# Patient Record
Sex: FEMALE | Race: WHITE | Employment: UNEMPLOYED | URBAN - NONMETROPOLITAN AREA
[De-identification: names, ages, dates, MRNs, and addresses within clinical notes are randomized per-mention and may not be internally consistent; named-entity substitution may affect disease eponyms.]

---

## 2017-01-01 ENCOUNTER — OFFICE VISIT (OUTPATIENT)
Dept: PSYCHOLOGY | Facility: OTHER | Age: 77
End: 2017-01-01
Attending: SOCIAL WORKER
Payer: COMMERCIAL

## 2017-01-01 DIAGNOSIS — F31.30 BIPOLAR I DISORDER, MOST RECENT EPISODE DEPRESSED (H): Primary | ICD-10-CM

## 2017-01-01 DIAGNOSIS — F33.2 SEVERE EPISODE OF RECURRENT MAJOR DEPRESSIVE DISORDER, WITHOUT PSYCHOTIC FEATURES (H): ICD-10-CM

## 2017-01-01 PROCEDURE — 90837 PSYTX W PT 60 MINUTES: CPT | Performed by: SOCIAL WORKER

## 2017-01-01 RX ORDER — BUPROPION HYDROCHLORIDE 300 MG/1
TABLET ORAL
Qty: 30 TABLET | Refills: 1 | Status: SHIPPED | OUTPATIENT
Start: 2017-01-01 | End: 2018-01-01

## 2017-01-01 ASSESSMENT — PATIENT HEALTH QUESTIONNAIRE - PHQ9: SUM OF ALL RESPONSES TO PHQ QUESTIONS 1-9: 22

## 2017-01-01 ASSESSMENT — ANXIETY QUESTIONNAIRES: GAD7 TOTAL SCORE: 15

## 2017-01-11 DIAGNOSIS — F01.518 VASCULAR DEMENTIA WITH BEHAVIOR DISTURBANCE (H): Primary | ICD-10-CM

## 2017-01-11 RX ORDER — DONEPEZIL HYDROCHLORIDE 10 MG/1
10 TABLET, FILM COATED ORAL AT BEDTIME
Qty: 30 TABLET | Refills: 5 | Status: SHIPPED | OUTPATIENT
Start: 2017-01-11 | End: 2017-06-19

## 2017-01-23 ENCOUNTER — OFFICE VISIT (OUTPATIENT)
Dept: PSYCHOLOGY | Facility: OTHER | Age: 77
End: 2017-01-23
Attending: SOCIAL WORKER
Payer: COMMERCIAL

## 2017-01-23 DIAGNOSIS — F31.30 BIPOLAR I DISORDER, MOST RECENT EPISODE DEPRESSED (H): Primary | ICD-10-CM

## 2017-01-23 PROCEDURE — 90791 PSYCH DIAGNOSTIC EVALUATION: CPT | Performed by: SOCIAL WORKER

## 2017-01-27 ENCOUNTER — OFFICE VISIT (OUTPATIENT)
Dept: PSYCHIATRY | Facility: OTHER | Age: 77
End: 2017-01-27
Attending: NURSE PRACTITIONER
Payer: COMMERCIAL

## 2017-01-27 VITALS
SYSTOLIC BLOOD PRESSURE: 124 MMHG | HEIGHT: 62 IN | TEMPERATURE: 98.9 F | DIASTOLIC BLOOD PRESSURE: 60 MMHG | BODY MASS INDEX: 18.55 KG/M2 | HEART RATE: 68 BPM | WEIGHT: 100.8 LBS | RESPIRATION RATE: 18 BRPM

## 2017-01-27 DIAGNOSIS — F31.9 BIPOLAR 1 DISORDER (H): ICD-10-CM

## 2017-01-27 DIAGNOSIS — F31.9 BIPOLAR 1 DISORDER (H): Primary | ICD-10-CM

## 2017-01-27 DIAGNOSIS — N39.41 URGE INCONTINENCE OF URINE: ICD-10-CM

## 2017-01-27 DIAGNOSIS — F33.41 RECURRENT MAJOR DEPRESSIVE DISORDER, IN PARTIAL REMISSION (H): Primary | ICD-10-CM

## 2017-01-27 PROCEDURE — 99212 OFFICE O/P EST SF 10 MIN: CPT

## 2017-01-27 PROCEDURE — 99214 OFFICE O/P EST MOD 30 MIN: CPT | Performed by: NURSE PRACTITIONER

## 2017-01-27 RX ORDER — TRAZODONE HYDROCHLORIDE 100 MG/1
100 TABLET ORAL AT BEDTIME
Qty: 30 TABLET | Refills: 3 | Status: SHIPPED | OUTPATIENT
Start: 2017-01-27 | End: 2018-01-01

## 2017-01-27 RX ORDER — GABAPENTIN 250 MG/5ML
SOLUTION ORAL
Qty: 450 ML | Refills: 3 | Status: CANCELLED | OUTPATIENT
Start: 2017-01-27

## 2017-01-27 RX ORDER — TOLTERODINE 4 MG/1
4 CAPSULE, EXTENDED RELEASE ORAL DAILY
Qty: 30 CAPSULE | Refills: 1 | Status: SHIPPED | OUTPATIENT
Start: 2017-01-27 | End: 2017-08-11

## 2017-01-27 RX ORDER — DULOXETIN HYDROCHLORIDE 30 MG/1
CAPSULE, DELAYED RELEASE ORAL
Qty: 30 CAPSULE | Refills: 3 | Status: SHIPPED | OUTPATIENT
Start: 2017-01-27 | End: 2017-05-08

## 2017-01-27 RX ORDER — GABAPENTIN 400 MG/1
CAPSULE ORAL
Qty: 30 CAPSULE | Refills: 3 | Status: SHIPPED | OUTPATIENT
Start: 2017-01-27 | End: 2017-05-26

## 2017-01-27 RX ORDER — GABAPENTIN 250 MG/5ML
SOLUTION ORAL
Qty: 450 ML | Refills: 3 | Status: SHIPPED | OUTPATIENT
Start: 2017-01-27 | End: 2017-03-10

## 2017-01-27 ASSESSMENT — PATIENT HEALTH QUESTIONNAIRE - PHQ9: 5. POOR APPETITE OR OVEREATING: NEARLY EVERY DAY

## 2017-01-27 ASSESSMENT — ANXIETY QUESTIONNAIRES
1. FEELING NERVOUS, ANXIOUS, OR ON EDGE: NEARLY EVERY DAY
GAD7 TOTAL SCORE: 21
2. NOT BEING ABLE TO STOP OR CONTROL WORRYING: NEARLY EVERY DAY
6. BECOMING EASILY ANNOYED OR IRRITABLE: NEARLY EVERY DAY
3. WORRYING TOO MUCH ABOUT DIFFERENT THINGS: NEARLY EVERY DAY
IF YOU CHECKED OFF ANY PROBLEMS ON THIS QUESTIONNAIRE, HOW DIFFICULT HAVE THESE PROBLEMS MADE IT FOR YOU TO DO YOUR WORK, TAKE CARE OF THINGS AT HOME, OR GET ALONG WITH OTHER PEOPLE: VERY DIFFICULT
7. FEELING AFRAID AS IF SOMETHING AWFUL MIGHT HAPPEN: NEARLY EVERY DAY
5. BEING SO RESTLESS THAT IT IS HARD TO SIT STILL: NEARLY EVERY DAY

## 2017-01-27 ASSESSMENT — PAIN SCALES - GENERAL: PAINLEVEL: NO PAIN (0)

## 2017-01-27 NOTE — PATIENT INSTRUCTIONS
1) Cymbalta 30 mg daily in addition to the 60 mg for a total of 90 mg.    2) Trazodone 50 mg d/daria. Start Trazodone 100 mg at HS.    3) Increase Gabapentin from 50 ml to 60 ml four times a day.    4) Start on Detrol LA 4 mg in the am.      Progress notes completed and signed for [place]. Returned them to pt.

## 2017-01-27 NOTE — NURSING NOTE
"Chief Complaint   Patient presents with     RECHECK       Initial /60 mmHg  Pulse 68  Temp(Src) 98.9  F (37.2  C) (Tympanic)  Resp 18  Ht 5' 1.5\" (1.562 m)  Wt 100 lb 12.8 oz (45.723 kg)  BMI 18.74 kg/m2 Estimated body mass index is 18.74 kg/(m^2) as calculated from the following:    Height as of this encounter: 5' 1.5\" (1.562 m).    Weight as of this encounter: 100 lb 12.8 oz (45.723 kg).  BP completed using cuff size: flaco Solorio LPN      "

## 2017-01-27 NOTE — MR AVS SNAPSHOT
After Visit Summary   1/27/2017    Tamera Kumar    MRN: [id number]           Patient Information     Date Of Birth          1940        Visit Information        Provider Department      1/27/2017 3:00 PM Naomi Cook APRN CNP [facility]        Today's Diagnoses     Recurrent major depressive disorder, in partial remission (H)    -  1     Bipolar 1 disorder         Urge incontinence of urine           Care Instructions    1) Cymbalta 30 mg daily in addition to the 60 mg for a total of 90 mg.    2) Trazodone 50 mg d/daria. Start Trazodone 100 mg at HS.    3) Increase Gabapentin from 50 ml to 60 ml four times a day.    4) Start on Detrol LA 4 mg in the am.      Progress notes completed and signed for [place]. Returned them to pt.        Follow-ups after your visit        Follow-up notes from your care team     Return in about 2 months (around 3/27/2017) for Routine Visit.      Your next 10 appointments already scheduled     Feb 08, 2017  2:30 PM   (Arrive by 2:15 PM)   Return Visit with Fouzia Mason [facility] ([facility])    [address] [place] [id number] [phone]            Feb 22, 2017  2:00 PM   (Arrive by 1:45 PM)   Return Visit with Fouzia Mason [facility] ([facility])    [address] [place] [id number] [phone]            Mar 10, 2017  1:30 PM   (Arrive by 1:15 PM)   Return Visit with IAN Red[facility] ([facility])    [address] [place] [id number] [phone]              Who to contact     If you have questions or need follow up information about today's clinic visit or your schedule please contact [facility] directly at [phone].  Normal or non-critical lab and imaging results will be communicated to you by MyChart, letter or phone within 4 business days after the clinic has received the  "results. If you do not hear from us within 7 days, please contact the clinic through [employer] or phone. If you have a critical or abnormal lab result, we will notify you by phone as soon as possible.  Submit refill requests through [employer] or call your pharmacy and they will forward the refill request to us. Please allow 3 business days for your refill to be completed.          Additional Information About Your Visit        [employer] Information     [employer] lets you send messages to your doctor, view your test results, renew your prescriptions, schedule appointments and more. To sign up, go to [email or web address] . Click on \"Log in\" on the left side of the screen, which will take you to the Welcome page. Then click on \"Sign up Now\" on the right side of the page.     You will be asked to enter the access code listed below, as well as some personal information. Please follow the directions to create your username and password.     Your access code is: [email or web address]  Expires: 2017  4:01 PM     Your access code will  in 90 days. If you need help or a new code, please call your [place] clinic or [phone].        Care EveryWhere ID     This is your Care EveryWhere ID. This could be used by other organizations to access your [place] medical records  [id number]        Your Vitals Were     Pulse Temperature Respirations Height BMI (Body Mass Index)       68 98.9  F (37.2  C) (Tympanic) 18 5' 1.5\" (1.562 m) 18.74 kg/m2        Blood Pressure from Last 3 Encounters:   17 124/60   16 116/70   16 213/114    Weight from Last 3 Encounters:   17 100 lb 12.8 oz (45.723 kg)   16 100 lb (45.36 kg)   16 124 lb 8 oz (56.473 kg)              Today, you had the following     No orders found for display         Today's Medication Changes          These changes are accurate as of: 17  4:01 PM.  If you have any questions, ask your nurse or doctor.               Start taking these " medicines.        Dose/Directions    tolterodine 4 MG 24 hr capsule   Commonly known as:  DETROL LA   Used for:  Urge incontinence of urine   Started by:  Naomi Cook APRN CNP        Dose:  4 mg   Take 1 capsule (4 mg) by mouth daily   Quantity:  30 capsule   Refills:  1         These medicines have changed or have updated prescriptions.        Dose/Directions    * CYMBALTA PO   This may have changed:  Another medication with the same name was added. Make sure you understand how and when to take each.   Changed by:  Naomi Cook APRN CNP        Dose:  40 mg   Take 40 mg by mouth daily   Refills:  0       * DULoxetine 60 MG EC capsule   Commonly known as:  CYMBALTA   This may have changed:  Another medication with the same name was added. Make sure you understand how and when to take each.   Used for:  Recurrent major depressive disorder, in partial remission (H)   Changed by:  Naomi Cook APRN CNP        Dose:  60 mg   Take 1 capsule (60 mg) by mouth daily   Quantity:  30 capsule   Refills:  1       * DULoxetine 30 MG EC capsule   Commonly known as:  CYMBALTA   This may have changed:  You were already taking a medication with the same name, and this prescription was added. Make sure you understand how and when to take each.   Used for:  Recurrent major depressive disorder, in partial remission (H)   Changed by:  Naomi Cook APRN CNP        Take 1 capsule daily in addition to 60 mg capsule to total 90 mg.   Quantity:  30 capsule   Refills:  3       * GABAPENTIN PO   This may have changed:  Another medication with the same name was changed. Make sure you understand how and when to take each.   Changed by:  Naomi Cook APRN CNP        Dose:  50 mg   Take 50 mg by mouth as needed Also takes every 2hrs prn for anxeity 50ml   Refills:  0       * gabapentin 400 MG capsule   Commonly known as:  NEURONTIN   This may have changed:    - how much to take  - how to take this  - when to take this  -  additional instructions   Used for:  Bipolar 1 disorder (H)   Changed by:  Naomi Cook APRN CNP        Pt is to take 60 ml 4 x daily at 8 am, 12 pm, 4 pm, 9 pm   Quantity:  30 capsule   Refills:  3       traZODone 100 MG tablet   Commonly known as:  DESYREL   This may have changed:    - medication strength  - how much to take  - additional instructions  - Another medication with the same name was removed. Continue taking this medication, and follow the directions you see here.   Used for:  Recurrent major depressive disorder, in partial remission (H)   Changed by:  Naomi Cook APRN CNP        Dose:  100 mg   Take 1 tablet (100 mg) by mouth At Bedtime   Quantity:  30 tablet   Refills:  3       * Notice:  This list has 5 medication(s) that are the same as other medications prescribed for you. Read the directions carefully, and ask your doctor or other care provider to review them with you.         Where to get your medicines      These medications were sent to [address] [address]     Phone:  [phone]    - DULoxetine 30 MG EC capsule  - gabapentin 400 MG capsule  - tolterodine 4 MG 24 hr capsule  - traZODone 100 MG tablet             Primary Care Provider Office Phone # Fax #    Keith Lemons [phone]       [facility] [address] [place] [phone]        Thank you!     Thank you for choosing [facility]  for your care. Our goal is always to provide you with excellent care. Hearing back from our patients is one way we can continue to improve our services. Please take a few minutes to complete the written survey that you may receive in the mail after your visit with us. Thank you!             Your Updated Medication List - Protect others around you: Learn how to safely use, store and throw away your medicines at www.disposemymeds.org.          This list is accurate as of: 1/27/17  4:01 PM.  Always  use your most recent med list.                   Brand Name Dispense Instructions for use    alum & mag hydroxide-simethicone 200-200-20 MG/5ML Susp suspension    MYLANTA/MAALOX     Take 30 mLs by mouth every 4 hours as needed for indigestion       amLODIPine 5 MG tablet    NORVASC    30 tablet    Take 1 tablet (5 mg) by mouth daily       BUPROPION HCL PO      Take 150 mg by mouth 2 times daily Taking Bupropion SR 150mg       cholecalciferol 5000 UNITS Caps     30 capsule    Take 1 capsule (5,000 Units) by mouth daily       cloNIDine 0.1 MG tablet    CATAPRES    30 tablet    Take 1 tablet (0.1 mg) by mouth every 4 hours as needed (Diastolic BP >170) Patient taking 0.1 mg 2x daily 8am and 9pm at [facility] according to their medication records.       * CYMBALTA PO      Take 40 mg by mouth daily       * DULoxetine 60 MG EC capsule    CYMBALTA    30 capsule    Take 1 capsule (60 mg) by mouth daily       * DULoxetine 30 MG EC capsule    CYMBALTA    30 capsule    Take 1 capsule daily in addition to 60 mg capsule to total 90 mg.       donepezil 10 MG tablet    ARICEPT    30 tablet    Take 1 tablet (10 mg) by mouth At Bedtime       * GABAPENTIN PO      Take 50 mg by mouth as needed Also takes every 2hrs prn for anxeity 50ml       * gabapentin 400 MG capsule    NEURONTIN    30 capsule    Pt is to take 60 ml 4 x daily at 8 am, 12 pm, 4 pm, 9 pm       levothyroxine 75 MCG tablet    SYNTHROID/LEVOTHROID    30 tablet    Take 1 tablet (75 mcg) by mouth daily       memantine 10 MG tablet    NAMENDA    30 tablet    Take 1 tablet (10 mg) by mouth daily       multivitamin, therapeutic with minerals Tabs tablet     30 each    Take 1 tablet by mouth daily       PRILOSEC PO      Take 20 mg by mouth every morning       Sennosides 25 MG Tabs    SENNA MAXIMUM STRENGTH    30 tablet    Take 1 tablet by mouth daily       silver sulfADIAZINE 1 % cream    SILVADENE     Apply topically 2 times daily       tolterodine 4 MG 24 hr capsule    DETROL  LA    30 capsule    Take 1 capsule (4 mg) by mouth daily       traZODone 100 MG tablet    DESYREL    30 tablet    Take 1 tablet (100 mg) by mouth At Bedtime       * Notice:  This list has 5 medication(s) that are the same as other medications prescribed for you. Read the directions carefully, and ask your doctor or other care provider to review them with you.

## 2017-01-27 NOTE — PROGRESS NOTES
"[facility]   Psychiatric Progress Note    Subjective   This is a 76 year old female with Bipolar 1, recurrent MDD, in partial remission, and urinary incontinence who presents today with c/o feeling \"depressed and anxious\" \"but better than before\". Tamera is unable to qualify what has changed that makes things better, \"I just know it is\". She has several requests today which are increasing her cymbalta from 60 mg to 90 mg daily, Her trazodone is ordered 50 mg 1-2 at bedtime she requests the order be changed to 100 mg so she does not have to ask, she would like her gabapentin increased slightly, and would like to restart detrol. She had been on detrol a few years ago but stopped taking it when it stopped working. She would like to try it again and see if it will work. Pt denies any feelings of amanda, hypomania, or depression. Overall she believes she is doing fairly well. She is following with Fouzia for therapy every 2 weeks and finds this beneficial, \"I need someone who I can go talk to\".          DIagnoses:    (F33.41) Recurrent major depressive disorder, in partial remission (H)  (primary encounter diagnosis)  Comment:   Plan: DULoxetine (CYMBALTA) 30 MG EC capsule, Take 30 mg capsule in addition to 60 mg capsule to total 90 mg.            traZODone (DESYREL) 100 MG tablet. Take 1 tab at bedtime.            (F31.9) Bipolar 1 disorder  Comment:   Plan: gabapentin (NEURONTIN) 400 MG capsule            Increased from 50 ml QID to 60 ml QID.    (N39.41) Urge incontinence of urine  Comment:   Plan: tolterodine (DETROL LA) 4 MG 24 hr capsule           Take 1 tab daily. If insurance is a problem may change to Detrol 2 mg BID        Attestation:  Patient has been seen and evaluated by me,  IAN Robles CNP          Interim History:   The patient's care was discussed with the treatment team and chart notes were reviewed.          Medications:        Current Outpatient Prescriptions   Medication     DULoxetine " "(CYMBALTA) 30 MG EC capsule     traZODone (DESYREL) 100 MG tablet     gabapentin (NEURONTIN) 400 MG capsule     tolterodine (DETROL LA) 4 MG 24 hr capsule     donepezil (ARICEPT) 10 MG tablet     silver sulfADIAZINE (SILVADENE) 1 % cream     BUPROPION HCL PO     Omeprazole (PRILOSEC PO)     GABAPENTIN PO     DULoxetine (CYMBALTA) 60 MG EC capsule     cloNIDine (CATAPRES) 0.1 MG tablet     amLODIPine (NORVASC) 5 MG tablet     Sennosides (SENNA MAXIMUM STRENGTH) 25 MG TABS     memantine (NAMENDA) 10 MG tablet     multivitamin, therapeutic with minerals (THERA-VIT-M) TABS     levothyroxine (SYNTHROID, LEVOTHROID) 75 MCG tablet     cholecalciferol 5000 UNITS CAPS     alum & mag hydroxide-simethicone (MYLANTA/MAALOX) 200-200-20 MG/5ML SUSP     DULoxetine HCl (CYMBALTA PO)     [DISCONTINUED] TRAZODONE HCL PO     [DISCONTINUED] traZODone (DESYREL) 50 MG tablet     [DISCONTINUED] gabapentin (NEURONTIN) 400 MG capsule     No current facility-administered medications for this visit.               Allergies:     Allergies   Allergen Reactions     Benadryl [Diphenhydramine] Unknown     Patient states she is allergic to benadryl     Depakote      Escitalopram      Suicidal ideation     Famotidine      Fluoxetine      Suicidal ideation     Hydrocodone      Lithium Swelling     Other [Seasonal Allergies]      Pet hair       Penicillins Nausea     Risperdal [Risperidone] Unknown     Patient states she is allergic to risperdal       Valproic Acid Unknown     \"seizures\"            Psychiatric Examination:   /60 mmHg  Pulse 68  Temp(Src) 98.9  F (37.2  C) (Tympanic)  Resp 18  Ht 5' 1.5\" (1.562 m)  Wt 100 lb 12.8 oz (45.723 kg)  BMI 18.74 kg/m2  Weight is 100 lbs 12.8 oz  Body mass index is 18.74 kg/(m^2).    Appearance:  awake, alert and adequately groomed  Attitude:  cooperative  Eye Contact:  good  Mood:  sad  and better  Affect:  mood congruent and intensity is normal  Speech:  clear, coherent  Psychomotor Behavior:  " evidence of tardive dyskinesia  Thought Process:  logical, linear and goal oriented  Associations:  no loose associations  Thought Content:  no evidence of suicidal ideation or homicidal ideation and no evidence of psychotic thought  Insight:  fair  Judgment:  fair  Oriented to:  time, person, and place  Attention Span and Concentration:  fair  Recent and Remote Memory:  fair  Fund of Knowledge: appropriate  Muscle Strength and Tone: normal  Gait and Station: Normal  Perception: No perceptual disorder noted.         Labs:   No results found for this or any previous visit (from the past 24 hour(s)).          Assessment/ Plan:   Medications: Will increase cymbalta, gabapentin and trazodone. Start on Detrol.     Medication side effects, actions and desired outcome reviewed with pt. She was provided an information handout as well.        40 minutes was spent with patient with over half of the time spent on counseling and coordination of care.

## 2017-01-28 ASSESSMENT — ANXIETY QUESTIONNAIRES: GAD7 TOTAL SCORE: 21

## 2017-01-28 ASSESSMENT — PATIENT HEALTH QUESTIONNAIRE - PHQ9: SUM OF ALL RESPONSES TO PHQ QUESTIONS 1-9: 25

## 2017-02-06 ENCOUNTER — TELEPHONE (OUTPATIENT)
Dept: PSYCHIATRY | Facility: OTHER | Age: 77
End: 2017-02-06

## 2017-02-06 NOTE — TELEPHONE ENCOUNTER
Last psychiatric office visit 01/27/17 with Naomi Cook where Trazodone was last filled. Pharmacy stating facility is asking for trazodone 25 MG, every bedtime as needed before 3 am. Please advise.
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
## 2017-02-08 NOTE — TELEPHONE ENCOUNTER
I don't know how to answer this one. Note from Naoim states Tamera asked for 100 mg evening - I don't see anything about a 25 mg of trazodone. Thanks, so I'm not going to make any changes for now and Tamera will need to speak with Naomi on this.

## 2017-02-13 ENCOUNTER — OFFICE VISIT (OUTPATIENT)
Dept: PSYCHOLOGY | Facility: OTHER | Age: 77
End: 2017-02-13
Attending: SOCIAL WORKER
Payer: COMMERCIAL

## 2017-02-13 DIAGNOSIS — F31.30 BIPOLAR I DISORDER, MOST RECENT EPISODE DEPRESSED (H): Primary | ICD-10-CM

## 2017-02-13 PROCEDURE — 90837 PSYTX W PT 60 MINUTES: CPT | Performed by: SOCIAL WORKER

## 2017-02-13 ASSESSMENT — ANXIETY QUESTIONNAIRES
2. NOT BEING ABLE TO STOP OR CONTROL WORRYING: NEARLY EVERY DAY
IF YOU CHECKED OFF ANY PROBLEMS ON THIS QUESTIONNAIRE, HOW DIFFICULT HAVE THESE PROBLEMS MADE IT FOR YOU TO DO YOUR WORK, TAKE CARE OF THINGS AT HOME, OR GET ALONG WITH OTHER PEOPLE: EXTREMELY DIFFICULT
1. FEELING NERVOUS, ANXIOUS, OR ON EDGE: NEARLY EVERY DAY
3. WORRYING TOO MUCH ABOUT DIFFERENT THINGS: NEARLY EVERY DAY
GAD7 TOTAL SCORE: 19
5. BEING SO RESTLESS THAT IT IS HARD TO SIT STILL: MORE THAN HALF THE DAYS
6. BECOMING EASILY ANNOYED OR IRRITABLE: MORE THAN HALF THE DAYS
7. FEELING AFRAID AS IF SOMETHING AWFUL MIGHT HAPPEN: NEARLY EVERY DAY

## 2017-02-13 ASSESSMENT — PATIENT HEALTH QUESTIONNAIRE - PHQ9: 5. POOR APPETITE OR OVEREATING: NEARLY EVERY DAY

## 2017-02-13 NOTE — MR AVS SNAPSHOT
After Visit Summary   2/13/2017    Tamera Kumar    MRN: [id number]           Patient Information     Date Of Birth          1940        Visit Information        Provider Department      [facility]2017 [facility]        Today's Diagnoses     Bipolar I disorder, most recent episode depressed (H)    -  1       Follow-ups after your visit        Your next 10 appointments already scheduled     Feb 22, 2017  2:00 PM CST   (Arrive by 1:45 PM)   Return Visit with Fouzia [facility] ([facility])    [address] [place] [address]   [phone]            Mar 10, 2017  1:30 PM CST   (Arrive by 1:15 PM)   Return Visit with IAN Redd [facility] ([facility])    [address] [place] [address] [place] [address]   [phone]            Mar 13, 2017 11:00 AM CDT   (Arrive by 10:45 AM)   Return Visit with Fouzia Mason [facility] ([facility])    [address] [place] [address]   [phone]              Who to contact     If you have questions or need follow up information about today's clinic visit or your schedule please contact [facility] directly at [phone].  Normal or non-critical lab and imaging results will be communicated to you by [employer], letter or phone within 4 business days after the clinic has received the results. If you do not hear from us within 7 days, please contact the clinic through [employer] or phone. If you have a critical or abnormal lab result, we will notify you by phone as soon as possible.  Submit refill requests through [employer] or call your pharmacy and they will forward the refill request to us. Please allow 3 business days for your refill to be completed.          Additional Information About Your Visit        [employer] Information     [employer] lets you send messages to your doctor, view your  "test results, renew your prescriptions, schedule appointments and more. To sign up, go to [email or web address] . Click on \"Log in\" on the left side of the screen, which will take you to the Welcome page. Then click on \"Sign up Now\" on the right side of the page.     You will be asked to enter the access code listed below, as well as some personal information. Please follow the directions to create your username and password.     Your access code is: [email or web address]  Expires: 2017  4:01 PM     Your access code will  in 90 days. If you need help or a new code, please call your [place] clinic or [phone].        Care EveryWhere ID     This is your Care EveryWhere ID. This could be used by other organizations to access your [facility] medical records  [id number]         Blood Pressure from Last 3 Encounters:   17 124/60   16 116/70   16 (!) 213/114    Weight from Last 3 Encounters:   17 100 lb 12.8 oz (45.7 kg)   16 100 lb (45.4 kg)   16 124 lb 8 oz (56.5 kg)              Today, you had the following     No orders found for display       Primary Care Provider Office Phone # Fax #    Keith Lemons [phone]       [facility] [address] [place] [address]        Thank you!     Thank you for choosing [facility]  for your care. Our goal is always to provide you with excellent care. Hearing back from our patients is one way we can continue to improve our services. Please take a few minutes to complete the written survey that you may receive in the mail after your visit with us. Thank you!             Your Updated Medication List - Protect others around you: Learn how to safely use, store and throw away your medicines at www.disposemymeds.org.          This list is accurate as of: 17 11:59 PM.  Always use your most recent med list.                   Brand Name Dispense Instructions for use    alum & mag hydroxide-simethicone " 200-200-20 MG/5ML Susp suspension    MYLANTA/MAALOX     Take 30 mLs by mouth every 4 hours as needed for indigestion       amLODIPine 5 MG tablet    NORVASC    30 tablet    Take 1 tablet (5 mg) by mouth daily       BUPROPION HCL PO      Take 150 mg by mouth 2 times daily Taking Bupropion SR 150mg       cholecalciferol 5000 UNITS Caps     30 capsule    Take 1 capsule (5,000 Units) by mouth daily       cloNIDine 0.1 MG tablet    CATAPRES    30 tablet    Take 1 tablet (0.1 mg) by mouth every 4 hours as needed (Diastolic BP >170) Patient taking 0.1 mg 2x daily 8am and 9pm at [place] according to their medication records.       * CYMBALTA PO      Take 40 mg by mouth daily       * DULoxetine 60 MG EC capsule    CYMBALTA    30 capsule    Take 1 capsule (60 mg) by mouth daily       * DULoxetine 30 MG EC capsule    CYMBALTA    30 capsule    Take 1 capsule daily in addition to 60 mg capsule to total 90 mg.       donepezil 10 MG tablet    ARICEPT    30 tablet    Take 1 tablet (10 mg) by mouth At Bedtime       * GABAPENTIN PO      Take 50 mg by mouth as needed Also takes every 2hrs prn for anxeity 50ml       * gabapentin 400 MG capsule    NEURONTIN    30 capsule    Pt is to take 60 ml 4 x daily at 8 am, 12 pm, 4 pm, 9 pm       * gabapentin 250 MG/5ML solution    NEURONTIN    450 mL    60 ml 4 times a day.       memantine 10 MG tablet    NAMENDA    30 tablet    Take 1 tablet (10 mg) by mouth daily       multivitamin, therapeutic with minerals Tabs tablet     30 each    Take 1 tablet by mouth daily       PRILOSEC PO      Take 20 mg by mouth every morning       Sennosides 25 MG Tabs    SENNA MAXIMUM STRENGTH    30 tablet    Take 1 tablet by mouth daily       silver sulfADIAZINE 1 % cream    SILVADENE     Apply topically 2 times daily       tolterodine 4 MG 24 hr capsule    DETROL LA    30 capsule    Take 1 capsule (4 mg) by mouth daily       traZODone 100 MG tablet    DESYREL    30 tablet    Take 1 tablet (100 mg) by mouth At  Bedtime       * Notice:  This list has 6 medication(s) that are the same as other medications prescribed for you. Read the directions carefully, and ask your doctor or other care provider to review them with you.

## 2017-02-14 DIAGNOSIS — E03.2 HYPOTHYROIDISM DUE TO NON-MEDICATION EXOGENOUS SUBSTANCES: ICD-10-CM

## 2017-02-14 RX ORDER — LEVOTHYROXINE SODIUM 75 UG/1
75 TABLET ORAL DAILY
Qty: 30 TABLET | Refills: 0 | Status: SHIPPED | OUTPATIENT
Start: 2017-02-14 | End: 2018-01-01

## 2017-02-14 NOTE — TELEPHONE ENCOUNTER
Synthroid     Last Written Prescription Date: 2/8/16  Last Quantity: 30, # refills: 0  [facility] Office [facility] prescribing provider: 1/27/17   Next 5 appointments (look out 90 days)     Feb 22, 2017  2:00 PM CST   (Arrive by 1:45 PM)   [facility] with CAROLINE Sanders   [facility] [place] [facility]    [address] [place] [address]  [place] [address]   [phone]            Mar 10, 2017  1:30 PM CST   (Arrive by 1:15 PM)   Return Visit with IAN Redd [facility] [place] ([facility] [place] [facility])    [address] [place] [address]  [place] [address] [phone]            Mar 13, 2017 11:00 AM CDT   (Arrive by 10:45 AM)   Return Visit with CAROLINE Sanders   [facility] ([facility] [place] [facility])    [address] [place] [address]  [place] [address] [phone]                   TSH   Date Value Ref Range Status   03/12/2016 0.82 0.40 - 4.00 mU/L Final

## 2017-02-16 ENCOUNTER — TELEPHONE (OUTPATIENT)
Dept: PSYCHIATRY | Facility: OTHER | Age: 77
End: 2017-02-16

## 2017-02-16 NOTE — TELEPHONE ENCOUNTER
Patient contacted and discussed Trazodone 25 mg.  Patient states that the 100 mg of Trazodone is working fine.  Was not aware of 25 mg of Trazodone.  Verified F/u appt on 3-10.

## 2017-02-21 NOTE — PROGRESS NOTES
Treatment Plan    Client's Name: Tamera Kumar  YOB: 1940    Date: February 13, 2017    DSM-V Diagnoses:   296.53 Bipolar I Disorder Current or Most Recent Episode Depressed, Severe    DA Date: 1/23/17  Psychosocial / Contextual Factors:   biological, social, interpersonal    Referral / Collaboration:  Referral to another professional/service is not indicated at this time.    Services to be provided/Interventions:   Interventions will be to alleviate anxiety, alleviate depressed mood, increase ability to function adaptively, increase coping skills, increase self esteem, process traumas, teach CBT skills, teach mindfulness skills and teach relaxation strategies.    Functional Impairment:   Activities of Daily Living, Social / Relational.    Anticipated number of session: 6      MeasurableTreatment Goal(s) related to diagnosis / functional impairment(s)  Goal 1: Client will report a reduction in depressed mood 5 sessions out of 6.     Objective A (Client Action)                  Client will learn IMR and CBT skills to use for reducing depressed mood.     Objective B  Client will report using IMR or CBT skills 5 days out of 7.     Objective C  Client will learn and use mindfulness skills 5 days out of 7.     Goal 2: Client will report a decrease in anxiety 5 out of 6 sessions.     Objective A (Client Action)                  Client will learn 5 relaxation skills.     Objective B  Client will report using relaxations skills 5 days out of 7.        CAROLINE SandersSW

## 2017-02-21 NOTE — PROGRESS NOTES
Progress Note    Client Name: Tamera Kumar  Date: February 13, 2017         Service Type: Individual      Session Start Time: 10:00  Session End Time: 10:54      Session Length: 54 minutes     Session #: 1     Attendees: Client attended alone    DSM-V Diagnoses:   296.53 Bipolar I Disorder Current or Most Recent Episode Depressed, Severe    DA Date: 1/23/17    Treatment Plan Due: 5/13/17  PHQ-9 :   PHQ-9 SCORE 12/16/2016 1/27/2017 2/13/2017   Total Score 27 25 27      BRIGID-7 :    BRIGID-7 SCORE 12/16/2016 1/27/2017 2/13/2017   Total Score 21 21 19           DATA      Progress Since Last Session (Related to Symptoms / Goals / Homework):   Symptoms: same    Homework: Completed in session     Current / Ongoing Stressors and Concerns:   Tamera presented today as very anxious, restless, and fidgety. Tamera explained that she has been experiencing excessive negative thoughts about herself and the world. She did report that she went to [employer] and has been watching the [employer] channel and these things appear to help her improve the depressive symptoms.     Treatment Objective(s) Addressed in This Session:   Objective A (Client Action)                  Client will learn IMR and CBT skills to use for reducing depressed mood.     Intervention:   Provided psycho-education utilizing CBT cognitive restructuring. Worked with Tamera on examples of the restructuring process.     Response to Interventions:   Tamera stated she understood this information, however she explained that she doesn't believe she'll be able to change right now.  Also worked with Tamera on this maladaptive thought process.     ASSESSMENT: Current Emotional / Mental Status (status of significant symptoms):   Risk status (Self / Other harm or suicidal ideation)   Client denies current fears or concerns for personal safety.   Client reports the following current or recent suicidal ideation or behaviors: Tamera reports  fleeting thoughts she would be better off dead, she denies any plan or intent.   Client denies current or recent homicidal ideation or behaviors.   Client denies current or recent self injurious behavior or ideation.   Client denies other safety concerns.   A safety and risk management plan has not been developed at this time, however client was given the after-hours number / 911 should there be a change in any of these risk factors.     Appearance:   Appropriate    Eye Contact:   Fair    Psychomotor Behavior: Restless    Attitude:   Cooperative    Orientation:   All   Speech    Rate / Production: Pressured     Volume:  Normal    Mood:    Anxious  Depressed    Affect:    Worrisome    Thought Content:  Rumination    Thought Form:  Coherent  Circumstantial   Insight:    Poor         Medication Compliance:   Yes     Changes in Health Issues:   None reported     Chemical Use Review:   Substance Use: Chemical use reviewed, no active concerns identified      Tobacco Use: No current tobacco use.       Collateral Reports Completed:   Not Applicable    PLAN: (Client Tasks / Therapist Tasks / Other)  Tamera was asked to practice the skills at home and to return for follow-up appointments.    Fouzia Mason [facility] CenterSW

## 2017-02-22 ENCOUNTER — OFFICE VISIT (OUTPATIENT)
Dept: PSYCHOLOGY | Facility: OTHER | Age: 77
End: 2017-02-22
Attending: SOCIAL WORKER
Payer: COMMERCIAL

## 2017-02-22 DIAGNOSIS — F31.30 BIPOLAR I DISORDER, MOST RECENT EPISODE DEPRESSED (H): Primary | ICD-10-CM

## 2017-02-22 PROCEDURE — 90837 PSYTX W PT 60 MINUTES: CPT | Performed by: SOCIAL WORKER

## 2017-02-22 ASSESSMENT — ANXIETY QUESTIONNAIRES
3. WORRYING TOO MUCH ABOUT DIFFERENT THINGS: MORE THAN HALF THE DAYS
7. FEELING AFRAID AS IF SOMETHING AWFUL MIGHT HAPPEN: MORE THAN HALF THE DAYS
5. BEING SO RESTLESS THAT IT IS HARD TO SIT STILL: SEVERAL DAYS
6. BECOMING EASILY ANNOYED OR IRRITABLE: SEVERAL DAYS
GAD7 TOTAL SCORE: 19
2. NOT BEING ABLE TO STOP OR CONTROL WORRYING: MORE THAN HALF THE DAYS
IF YOU CHECKED OFF ANY PROBLEMS ON THIS QUESTIONNAIRE, HOW DIFFICULT HAVE THESE PROBLEMS MADE IT FOR YOU TO DO YOUR WORK, TAKE CARE OF THINGS AT HOME, OR GET ALONG WITH OTHER PEOPLE: SOMEWHAT DIFFICULT
1. FEELING NERVOUS, ANXIOUS, OR ON EDGE: SEVERAL DAYS
GAD7 TOTAL SCORE: 11

## 2017-02-22 ASSESSMENT — PATIENT HEALTH QUESTIONNAIRE - PHQ9
5. POOR APPETITE OR OVEREATING: MORE THAN HALF THE DAYS
SUM OF ALL RESPONSES TO PHQ QUESTIONS 1-9: 27

## 2017-02-22 NOTE — MR AVS SNAPSHOT
After Visit Summary   2/22/2017    Tamera Kumar    MRN: [id number]           Patient Information     Date Of Birth          1940        Visit Information        Provider Department      2/22/2017 2:00 PM [facility]        Today's Diagnoses     Bipolar I disorder, most recent episode depressed (H)    -  1       Follow-ups after your visit        Your next 10 appointments already scheduled     Mar 10, 2017  1:30 PM CST   (Arrive by 1:15 PM)   Return Visit with IAN Redd [facility]    [address] [place], [place] [address] [place] [address]   [phone]            Mar 13, 2017 11:00 AM CDT   (Arrive by 10:45 AM)   Return Visit with Fouzia Mason [facility]    [address] [place] [address]   [phone]            Apr 10, 2017  2:00 PM CDT   (Arrive by 1:45 PM)   Return Visit with Fouzia Mason [facility] ([facility])    [address] [place] [address] [phone]              Who to contact     If you have questions or need follow up information about today's clinic visit or your schedule please contact [facility] directly at [phone].  Normal or non-critical lab and imaging results will be communicated to you by Crackhart, letter or phone within 4 business days after the clinic has received the results. If you do not hear from us within 7 days, please contact the clinic through Crackhart or phone. If you have a critical or abnormal lab result, we will notify you by phone as soon as possible.  Submit refill requests through MOLI or call your pharmacy and they will forward the refill request to us. Please allow 3 business days for your refill to be completed.          Additional Information About Your Visit        CrackharSpanlink Communications Information     MOLI lets you send messages to your doctor, view your  "test results, renew your prescriptions, schedule appointments and more. To sign up, go to [email or web address] . Click on \"Log in\" on the left side of the screen, which will take you to the Welcome page. Then click on \"Sign up Now\" on the right side of the page.     You will be asked to enter the access code listed below, as well as some personal information. Please follow the directions to create your username and password.     Your access code is: [email or web address]  Expires: 2017  4:01 PM     Your access code will  in 90 days. If you need help or a new code, please call your [place] clinic or [phone].        Care EveryWhere ID     This is your Care EveryWhere ID. This could be used by other organizations to access your [place] medical records  [id number]         Blood Pressure from Last 3 Encounters:   17 124/60   16 116/70   16 (!) 213/114    Weight from Last 3 Encounters:   17 100 lb 12.8 oz (45.7 kg)   16 100 lb (45.4 kg)   16 124 lb 8 oz (56.5 kg)              Today, you had the following     No orders found for display       Primary Care Provider Office Phone # Fax #    Keith Lemons [phone]       [facility] [address] [place] [address]        Thank you!     Thank you for choosing [facility]  for your care. Our goal is always to provide you with excellent care. Hearing back from our patients is one way we can continue to improve our services. Please take a few minutes to complete the written survey that you may receive in the mail after your visit with us. Thank you!             Your Updated Medication List - Protect others around you: Learn how to safely use, store and throw away your medicines at www.disposemymeds.org.          This list is accurate as of: 17 11:59 PM.  Always use your most recent med list.                   Brand Name Dispense Instructions for use    alum & mag hydroxide-simethicone " 200-200-20 MG/5ML Susp suspension    MYLANTA/MAALOX     Take 30 mLs by mouth every 4 hours as needed for indigestion       amLODIPine 5 MG tablet    NORVASC    30 tablet    Take 1 tablet (5 mg) by mouth daily       BUPROPION HCL PO      Take 150 mg by mouth 2 times daily Taking Bupropion SR 150mg       cholecalciferol 5000 UNITS Caps     30 capsule    Take 1 capsule (5,000 Units) by mouth daily       cloNIDine 0.1 MG tablet    CATAPRES    30 tablet    Take 1 tablet (0.1 mg) by mouth every 4 hours as needed (Diastolic BP >170) Patient taking 0.1 mg 2x daily 8am and 9pm at [facility] according to their medication records.       * CYMBALTA PO      Take 40 mg by mouth daily       * DULoxetine 60 MG EC capsule    CYMBALTA    30 capsule    Take 1 capsule (60 mg) by mouth daily       * DULoxetine 30 MG EC capsule    CYMBALTA    30 capsule    Take 1 capsule daily in addition to 60 mg capsule to total 90 mg.       donepezil 10 MG tablet    ARICEPT    30 tablet    Take 1 tablet (10 mg) by mouth At Bedtime       * GABAPENTIN PO      Take 50 mg by mouth as needed Also takes every 2hrs prn for anxeity 50ml       * gabapentin 400 MG capsule    NEURONTIN    30 capsule    Pt is to take 60 ml 4 x daily at 8 am, 12 pm, 4 pm, 9 pm       * gabapentin 250 MG/5ML solution    NEURONTIN    450 mL    60 ml 4 times a day.       levothyroxine 75 MCG tablet    SYNTHROID/LEVOTHROID    30 tablet    Take 1 tablet (75 mcg) by mouth daily       memantine 10 MG tablet    NAMENDA    30 tablet    Take 1 tablet (10 mg) by mouth daily       multivitamin, therapeutic with minerals Tabs tablet     30 each    Take 1 tablet by mouth daily       PRILOSEC PO      Take 20 mg by mouth every morning       Sennosides 25 MG Tabs    SENNA MAXIMUM STRENGTH    30 tablet    Take 1 tablet by mouth daily       silver sulfADIAZINE 1 % cream    SILVADENE     Apply topically 2 times daily       tolterodine 4 MG 24 hr capsule    DETROL LA    30 capsule    Take 1 capsule (4  mg) by mouth daily       traZODone 100 MG tablet    DESYREL    30 tablet    Take 1 tablet (100 mg) by mouth At Bedtime       * Notice:  This list has 6 medication(s) that are the same as other medications prescribed for you. Read the directions carefully, and ask your doctor or other care provider to review them with you.

## 2017-02-28 ASSESSMENT — PATIENT HEALTH QUESTIONNAIRE - PHQ9: SUM OF ALL RESPONSES TO PHQ QUESTIONS 1-9: 17

## 2017-02-28 ASSESSMENT — ANXIETY QUESTIONNAIRES: GAD7 TOTAL SCORE: 11

## 2017-03-10 ENCOUNTER — OFFICE VISIT (OUTPATIENT)
Dept: PSYCHIATRY | Facility: OTHER | Age: 77
End: 2017-03-10
Attending: NURSE PRACTITIONER
Payer: COMMERCIAL

## 2017-03-10 VITALS
SYSTOLIC BLOOD PRESSURE: 110 MMHG | HEIGHT: 62 IN | BODY MASS INDEX: 18.03 KG/M2 | OXYGEN SATURATION: 99 % | TEMPERATURE: 97.8 F | HEART RATE: 62 BPM | DIASTOLIC BLOOD PRESSURE: 60 MMHG | WEIGHT: 98 LBS

## 2017-03-10 DIAGNOSIS — E55.9 HYPOVITAMINOSIS D: ICD-10-CM

## 2017-03-10 DIAGNOSIS — F33.1 MODERATE EPISODE OF RECURRENT MAJOR DEPRESSIVE DISORDER (H): Primary | ICD-10-CM

## 2017-03-10 PROCEDURE — 99214 OFFICE O/P EST MOD 30 MIN: CPT | Performed by: NURSE PRACTITIONER

## 2017-03-10 PROCEDURE — 99000 SPECIMEN HANDLING OFFICE-LAB: CPT | Performed by: NURSE PRACTITIONER

## 2017-03-10 PROCEDURE — 82306 VITAMIN D 25 HYDROXY: CPT | Performed by: NURSE PRACTITIONER

## 2017-03-10 PROCEDURE — 36415 COLL VENOUS BLD VENIPUNCTURE: CPT | Performed by: NURSE PRACTITIONER

## 2017-03-10 RX ORDER — BUPROPION HYDROCHLORIDE 200 MG/1
200 TABLET, EXTENDED RELEASE ORAL DAILY
Qty: 30 TABLET | Refills: 3 | Status: SHIPPED | OUTPATIENT
Start: 2017-03-10 | End: 2017-05-08

## 2017-03-10 RX ORDER — DULOXETIN HYDROCHLORIDE 20 MG/1
20 CAPSULE, DELAYED RELEASE ORAL DAILY
Qty: 30 CAPSULE | Refills: 3 | Status: SHIPPED | OUTPATIENT
Start: 2017-03-10 | End: 2017-04-28 | Stop reason: DRUGHIGH

## 2017-03-10 ASSESSMENT — PAIN SCALES - GENERAL: PAINLEVEL: NO PAIN (0)

## 2017-03-10 ASSESSMENT — ANXIETY QUESTIONNAIRES
5. BEING SO RESTLESS THAT IT IS HARD TO SIT STILL: MORE THAN HALF THE DAYS
1. FEELING NERVOUS, ANXIOUS, OR ON EDGE: NEARLY EVERY DAY
2. NOT BEING ABLE TO STOP OR CONTROL WORRYING: NEARLY EVERY DAY
GAD7 TOTAL SCORE: 19
IF YOU CHECKED OFF ANY PROBLEMS ON THIS QUESTIONNAIRE, HOW DIFFICULT HAVE THESE PROBLEMS MADE IT FOR YOU TO DO YOUR WORK, TAKE CARE OF THINGS AT HOME, OR GET ALONG WITH OTHER PEOPLE: VERY DIFFICULT
6. BECOMING EASILY ANNOYED OR IRRITABLE: MORE THAN HALF THE DAYS
7. FEELING AFRAID AS IF SOMETHING AWFUL MIGHT HAPPEN: NEARLY EVERY DAY
3. WORRYING TOO MUCH ABOUT DIFFERENT THINGS: NEARLY EVERY DAY

## 2017-03-10 ASSESSMENT — PATIENT HEALTH QUESTIONNAIRE - PHQ9: 5. POOR APPETITE OR OVEREATING: NEARLY EVERY DAY

## 2017-03-10 NOTE — PATIENT INSTRUCTIONS
"I will increase your wellbutrin from 150 mg to 200 mg in the am.    Your Vitamin D level is being checked today. The goal is between 60-70 for optimal mood sustainability. Your level certainly may be low given the winter and your time spent indoors.    Continue to follow with Fouzia. This is the very best way to improve the way you think and learn ways to stop \"edward thinkin\".  "

## 2017-03-10 NOTE — PROGRESS NOTES
Advance Care Planning 3/10/2017: ACP Review of Chart / Resources Provided:  Reviewed chart for advance care plan.  Tamera Kumar has no plan or code status on file. Discussed available resources and provided with information. Confirmed code status reflects current choices pending further ACP discussions.  Confirmed/documented legally designated decision makers.  Added by Colt Solorio

## 2017-03-10 NOTE — MR AVS SNAPSHOT
"              After Visit Summary   3/10/2017    Tamera Kumar    MRN: [id number]           Patient Information     Date Of Birth          1940        Visit Information        Provider Department      3/10/2017 1:30 PM Naomi Cook APRN CNP [facility]        Today's Diagnoses     Moderate episode of recurrent major depressive disorder (H)    -  1    Hypovitaminosis D          Care Instructions    I will increase your wellbutrin from 150 mg to 200 mg in the am.    Your Vitamin D level is being checked today. The goal is between 60-70 for optimal mood sustainability. Your level certainly may be low given the winter and your time spent indoors.    Continue to follow with Fouzia. This is the very best way to improve the way you think and learn ways to stop \"edward thinkin\".        Follow-ups after your visit        Follow-up notes from your care team     Return in about 4 weeks (around 4/7/2017) for Routine Visit.      Your next 10 appointments already scheduled     Mar 13, 2017 11:00 AM CDT   (Arrive by 10:45 AM)   Return Visit with Fouzia Mason [facility]    [address] [place], [place] [address] [address]   [phone]            Apr 10, 2017  2:00 PM CDT   (Arrive by 1:45 PM)   Return Visit with Fouzia Mason [facility]   [facility]    [address] [place], [place] [address] [place] [address]   [phone]            Apr 21, 2017 11:30 AM CDT   (Arrive by 11:15 AM)   Return Visit with IAN Redd CNP   [facility] ([facility])    [address] [place], [place] [address] [place] [address]   [phone]              Who to contact     If you have questions or need follow up information about today's clinic visit or your schedule please contact [facility] directly at [phone].  Normal or non-critical lab and imaging results will be communicated to you by MyChart, letter or phone within 4 business " "days after the clinic has received the results. If you do not hear from us within 7 days, please contact the clinic through Nudge or phone. If you have a critical or abnormal lab result, we will notify you by phone as soon as possible.  Submit refill requests through Nudge or call your pharmacy and they will forward the refill request to us. Please allow 3 business days for your refill to be completed.          Additional Information About Your Visit        Nudge Information     Nudge lets you send messages to your doctor, view your test results, renew your prescriptions, schedule appointments and more. To sign up, go to [email or web address] . Click on \"Log in\" on the left side of the screen, which will take you to the Welcome page. Then click on \"Sign up Now\" on the right side of the page.     You will be asked to enter the access code listed below, as well as some personal information. Please follow the directions to create your username and password.     Your access code is: [email or web address]  Expires: 2017  4:01 PM     Your access code will  in 90 days. If you need help or a new code, please call your [facility] clinic or [phone].        Care EveryWhere ID     This is your Care EveryWhere ID. This could be used by other organizations to access your [facility] medical records  [id number]        Your Vitals Were     Pulse Temperature Height Pulse Oximetry BMI (Body Mass Index)       62 97.8  F (36.6  C) (Tympanic) 5' 2\" (1.575 m) 99% 17.92 kg/m2        Blood Pressure from Last 3 Encounters:   03/10/17 110/60   17 124/60   16 116/70    Weight from Last 3 Encounters:   03/10/17 98 lb (44.5 kg)   17 100 lb 12.8 oz (45.7 kg)   16 100 lb (45.4 kg)              We Performed the Following     Vitamin D Deficiency          Today's Medication Changes          These changes are accurate as of: 3/10/17  9:34 PM.  If you have any questions, ask your nurse or doctor.             "   Start taking these medicines.        Dose/Directions    buPROPion 200 MG 12 hr tablet   Commonly known as:  WELLBUTRIN SR   Used for:  Moderate episode of recurrent major depressive disorder (H)   Replaces:  BUPROPION HCL PO   Started by:  Naomi Cook APRN CNP        Dose:  200 mg   Take 1 tablet (200 mg) by mouth daily   Quantity:  30 tablet   Refills:  3         These medicines have changed or have updated prescriptions.        Dose/Directions    * DULoxetine 60 MG EC capsule   Commonly known as:  CYMBALTA   This may have changed:  Another medication with the same name was changed. Make sure you understand how and when to take each.   Used for:  Recurrent major depressive disorder, in partial remission (H)   Changed by:  Naomi Cook APRN CNP        Dose:  60 mg   Take 1 capsule (60 mg) by mouth daily   Quantity:  30 capsule   Refills:  1       * DULoxetine 30 MG EC capsule   Commonly known as:  CYMBALTA   This may have changed:  Another medication with the same name was changed. Make sure you understand how and when to take each.   Used for:  Recurrent major depressive disorder, in partial remission (H)   Changed by:  Naomi Cook APRN CNP        Take 1 capsule daily in addition to 60 mg capsule to total 90 mg.   Quantity:  30 capsule   Refills:  3       * DULoxetine 20 MG EC capsule   Commonly known as:  CYMBALTA   This may have changed:    - how much to take  - additional instructions   Used for:  Moderate episode of recurrent major depressive disorder (H)   Changed by:  Naomi Cook APRN CNP        Dose:  20 mg   Take 1 capsule (20 mg) by mouth daily Take in addition to 60 mg dose to total 80 mg   Quantity:  30 capsule   Refills:  3       * Notice:  This list has 3 medication(s) that are the same as other medications prescribed for you. Read the directions carefully, and ask your doctor or other care provider to review them with you.      Stop taking these medicines if you haven't already.  Please contact your care team if you have questions.     BUPROPION HCL PO   Replaced by:  buPROPion 200 MG 12 hr tablet   Stopped by:  Naomi Cook APRN CNP                Where to get your medicines      These medications were sent to [facility] - [place], [place] - [address]  [address] [facility] [place] [address]     Phone:  [phone]     buPROPion 200 MG 12 hr tablet    DULoxetine 20 MG EC capsule                Primary Care Provider Office Phone # Fax #    Keith Lemons [phone]       [place] [address] [place] [address]        Thank you!     Thank you for choosing [facility]  for your care. Our goal is always to provide you with excellent care. Hearing back from our patients is one way we can continue to improve our services. Please take a few minutes to complete the written survey that you may receive in the mail after your visit with us. Thank you!             Your Updated Medication List - Protect others around you: Learn how to safely use, store and throw away your medicines at www.disposemymeds.org.          This list is accurate as of: 3/10/17  9:34 PM.  Always use your most recent med list.                   Brand Name Dispense Instructions for use    alum & mag hydroxide-simethicone 200-200-20 MG/5ML Susp suspension    MYLANTA/MAALOX     Take 30 mLs by mouth every 4 hours as needed for indigestion       amLODIPine 5 MG tablet    NORVASC    30 tablet    Take 1 tablet (5 mg) by mouth daily       buPROPion 200 MG 12 hr tablet    WELLBUTRIN SR    30 tablet    Take 1 tablet (200 mg) by mouth daily       cholecalciferol 5000 UNITS Caps     30 capsule    Take 1 capsule (5,000 Units) by mouth daily       * CLONIDINE HCL PO      Take 0.1 mg by mouth 2 times daily       * cloNIDine 0.1 MG tablet    CATAPRES    30 tablet    Take 1 tablet (0.1 mg) by mouth every 4 hours as needed (Diastolic BP >170) Patient taking 0.1 mg 2x daily 8am and 9pm at  [facility] according to their medication records.       donepezil 10 MG tablet    ARICEPT    30 tablet    Take 1 tablet (10 mg) by mouth At Bedtime       * DULoxetine 60 MG EC capsule    CYMBALTA    30 capsule    Take 1 capsule (60 mg) by mouth daily       * DULoxetine 30 MG EC capsule    CYMBALTA    30 capsule    Take 1 capsule daily in addition to 60 mg capsule to total 90 mg.       * DULoxetine 20 MG EC capsule    CYMBALTA    30 capsule    Take 1 capsule (20 mg) by mouth daily Take in addition to 60 mg dose to total 80 mg       * GABAPENTIN PO      Take 50 mg by mouth as needed Also takes every 2hrs prn for anxeity 50ml       * gabapentin 400 MG capsule    NEURONTIN    30 capsule    Pt is to take 60 ml 4 x daily at 8 am, 12 pm, 4 pm, 9 pm       levothyroxine 75 MCG tablet    SYNTHROID/LEVOTHROID    30 tablet    Take 1 tablet (75 mcg) by mouth daily       memantine 10 MG tablet    NAMENDA    30 tablet    Take 1 tablet (10 mg) by mouth daily       multivitamin, therapeutic with minerals Tabs tablet     30 each    Take 1 tablet by mouth daily       PRILOSEC PO      Take 20 mg by mouth every morning       Sennosides 25 MG Tabs    SENNA MAXIMUM STRENGTH    30 tablet    Take 1 tablet by mouth daily       silver sulfADIAZINE 1 % cream    SILVADENE     Apply topically 2 times daily       tolterodine 4 MG 24 hr capsule    DETROL LA    30 capsule    Take 1 capsule (4 mg) by mouth daily       traZODone 100 MG tablet    DESYREL    30 tablet    Take 1 tablet (100 mg) by mouth At Bedtime       * Notice:  This list has 7 medication(s) that are the same as other medications prescribed for you. Read the directions carefully, and ask your doctor or other care provider to review them with you.

## 2017-03-10 NOTE — NURSING NOTE
"Chief Complaint   Patient presents with     RECHECK       Initial /60 (BP Location: Right arm, Patient Position: Chair, Cuff Size: Adult Regular)  Pulse 62  Temp 97.8  F (36.6  C) (Tympanic)  Ht 5' 2\" (1.575 m)  Wt 98 lb (44.5 kg)  SpO2 99%  BMI 17.92 kg/m2 Estimated body mass index is 17.92 kg/(m^2) as calculated from the following:    Height as of this encounter: 5' 2\" (1.575 m).    Weight as of this encounter: 98 lb (44.5 kg).  Medication Reconciliation: complete   Colt Solorio LPN      "

## 2017-03-11 ASSESSMENT — ANXIETY QUESTIONNAIRES: GAD7 TOTAL SCORE: 19

## 2017-03-11 ASSESSMENT — PATIENT HEALTH QUESTIONNAIRE - PHQ9: SUM OF ALL RESPONSES TO PHQ QUESTIONS 1-9: 22

## 2017-03-11 NOTE — PROGRESS NOTES
"[facility]  Psychiatric Progress Note    Subjective   This is a 76 year old female with bipolar 1 disorder, MDD, and hypovitaminosis D. Tamera today reports feeling as if she has no emotion. She has been reading books written by Sally Meredith which she enjoys. She is seeing Fouzia Mason for therapy which she finds \"essential to her mental health\" adding that Fouzia is helping her to change the way she thinks. Tamera questions if there is hope for her to change and is receptive to encouragement and strategies for mood improvement. There was discrepancy noted between her EMR and the record from [facility].Tamera c/o feeling \"flat\" with very little motivation. She is on wellbutrin  mg in the am, discussed increasing this to 200 mg in the am which she would like to do. Will also check her Vitamin D level and supplement her with the goal of 60-70 to optimize her brain health.           DIagnoses:   1. Moderate episode of recurrent major depressive disorder (H)    - DULoxetine (CYMBALTA) 20 MG EC capsule; Take 1 capsule (20 mg) by mouth daily Take in addition to 60 mg dose to total 80 mg  Dispense: 30 capsule; Refill: 3  - buPROPion (WELLBUTRIN SR) 200 MG 12 hr tablet; Take 1 tablet (200 mg) by mouth daily  Dispense: 30 tablet; Refill: 3    2. Hypovitaminosis D    - Vitamin D Deficiency      Attestation:  Patient has been seen and evaluated by me,  IAN Robles CNP          Interim History:   The patient's care was discussed with the treatment team and chart notes were reviewed.          Medications:       Current Outpatient Prescriptions:      CLONIDINE HCL PO, Take 0.1 mg by mouth 2 times daily, Disp: , Rfl:      DULoxetine (CYMBALTA) 20 MG EC capsule, Take 1 capsule (20 mg) by mouth daily Take in addition to 60 mg dose to total 80 mg, Disp: 30 capsule, Rfl: 3     buPROPion (WELLBUTRIN SR) 200 MG 12 hr tablet, Take 1 tablet (200 mg) by mouth daily, Disp: 30 tablet, Rfl: 3     levothyroxine " (SYNTHROID/LEVOTHROID) 75 MCG tablet, Take 1 tablet (75 mcg) by mouth daily, Disp: 30 tablet, Rfl: 0     DULoxetine (CYMBALTA) 30 MG EC capsule, Take 1 capsule daily in addition to 60 mg capsule to total 90 mg., Disp: 30 capsule, Rfl: 3     traZODone (DESYREL) 100 MG tablet, Take 1 tablet (100 mg) by mouth At Bedtime, Disp: 30 tablet, Rfl: 3     gabapentin (NEURONTIN) 400 MG capsule, Pt is to take 60 ml 4 x daily at 8 am, 12 pm, 4 pm, 9 pm, Disp: 30 capsule, Rfl: 3     donepezil (ARICEPT) 10 MG tablet, Take 1 tablet (10 mg) by mouth At Bedtime, Disp: 30 tablet, Rfl: 5     Omeprazole (PRILOSEC PO), Take 20 mg by mouth every morning, Disp: , Rfl:      GABAPENTIN PO, Take 50 mg by mouth as needed Also takes every 2hrs prn for anxeity 50ml, Disp: , Rfl:      DULoxetine (CYMBALTA) 60 MG EC capsule, Take 1 capsule (60 mg) by mouth daily, Disp: 30 capsule, Rfl: 1     Sennosides (SENNA MAXIMUM STRENGTH) 25 MG TABS, Take 1 tablet by mouth daily, Disp: 30 tablet, Rfl: 0     memantine (NAMENDA) 10 MG tablet, Take 1 tablet (10 mg) by mouth daily, Disp: 30 tablet, Rfl: 0     multivitamin, therapeutic with minerals (THERA-VIT-M) TABS, Take 1 tablet by mouth daily, Disp: 30 each, Rfl: 0     cholecalciferol 5000 UNITS CAPS, Take 1 capsule (5,000 Units) by mouth daily, Disp: 30 capsule, Rfl: 0     alum & mag hydroxide-simethicone (MYLANTA/MAALOX) 200-200-20 MG/5ML SUSP, Take 30 mLs by mouth every 4 hours as needed for indigestion, Disp: , Rfl:      tolterodine (DETROL LA) 4 MG 24 hr capsule, Take 1 capsule (4 mg) by mouth daily, Disp: 30 capsule, Rfl: 1     [DISCONTINUED] gabapentin (NEURONTIN) 250 MG/5ML solution, 60 ml 4 times a day., Disp: 450 mL, Rfl: 3     silver sulfADIAZINE (SILVADENE) 1 % cream, Apply topically 2 times daily, Disp: , Rfl:      cloNIDine (CATAPRES) 0.1 MG tablet, Take 1 tablet (0.1 mg) by mouth every 4 hours as needed (Diastolic BP >170) Patient taking 0.1 mg 2x daily 8am and 9pm at [place] according to  "their medication records. (Patient not taking: Reported on 3/10/2017), Disp: 30 tablet, Rfl: 3     amLODIPine (NORVASC) 5 MG tablet, Take 1 tablet (5 mg) by mouth daily, Disp: 30 tablet, Rfl: 3     [DISCONTINUED] DULoxetine HCl (CYMBALTA PO), Take 40 mg by mouth daily, Disp: , Rfl:      [DISCONTINUED] BUPROPION HCL PO, Take 150 mg by mouth 2 times daily Taking Bupropion SR 150mg, Disp: , Rfl:             Allergies:     Allergies   Allergen Reactions     Benadryl [Diphenhydramine] Unknown     Patient states she is allergic to benadryl     Depakote      Escitalopram      Suicidal ideation     Famotidine      Fluoxetine      Suicidal ideation     Hydrocodone      Lithium Swelling     Other [Seasonal Allergies]      Pet hair       Penicillins Nausea     Risperdal [Risperidone] Unknown     Patient states she is allergic to risperdal       Valproic Acid Unknown     \"seizures\"            Psychiatric Examination:   /60 (BP Location: Right arm, Patient Position: Chair, Cuff Size: Adult Regular)  Pulse 62  Temp 97.8  F (36.6  C) (Tympanic)  Ht 5' 2\" (1.575 m)  Wt 98 lb (44.5 kg)  SpO2 99%  BMI 17.92 kg/m2  Weight is 98 lbs 0 oz  Body mass index is 17.92 kg/(m^2).    Appearance:  awake, alert frail appearing 77 yo female  Attitude:  cooperative  Eye Contact:  good  Mood:  \"horseshit, I have poor self esteem\"  Affect:  mood congruent and intensity is blunted  Speech:  clear, coherent  Psychomotor Behavior:  evidence of tardive dyskinesia  Thought Process:  goal oriented  Associations:  no loose associations  Thought Content:  no evidence of suicidal ideation or homicidal ideation and no evidence of psychotic thought  Insight:  fair  Judgment:  fair  Oriented to:  time, person, and place  Attention Span and Concentration:  fair  Recent and Remote Memory:  fair  Fund of Knowledge: appropriate  Muscle Strength and Tone: normal  Gait and Station: Normal  Perception no perceptual disorder noted         Labs:    pending. " Vitamin D ordered          Assessment/ Plan:   Medications: Increase wellbutrin to 200 mg in the am.   Check vitamin D level, adjust dose to obtain goal of 60-70.  Continue to follow with [facility]

## 2017-03-14 LAB — DEPRECATED CALCIDIOL+CALCIFEROL SERPL-MC: 89 UG/L (ref 20–75)

## 2017-04-24 ENCOUNTER — OFFICE VISIT (OUTPATIENT)
Dept: PSYCHOLOGY | Facility: OTHER | Age: 77
End: 2017-04-24
Attending: SOCIAL WORKER
Payer: COMMERCIAL

## 2017-04-24 DIAGNOSIS — F31.30 BIPOLAR I DISORDER, MOST RECENT EPISODE DEPRESSED (H): Primary | ICD-10-CM

## 2017-04-24 PROCEDURE — 90837 PSYTX W PT 60 MINUTES: CPT | Performed by: SOCIAL WORKER

## 2017-04-24 ASSESSMENT — ANXIETY QUESTIONNAIRES
7. FEELING AFRAID AS IF SOMETHING AWFUL MIGHT HAPPEN: MORE THAN HALF THE DAYS
IF YOU CHECKED OFF ANY PROBLEMS ON THIS QUESTIONNAIRE, HOW DIFFICULT HAVE THESE PROBLEMS MADE IT FOR YOU TO DO YOUR WORK, TAKE CARE OF THINGS AT HOME, OR GET ALONG WITH OTHER PEOPLE: SOMEWHAT DIFFICULT
3. WORRYING TOO MUCH ABOUT DIFFERENT THINGS: MORE THAN HALF THE DAYS
GAD7 TOTAL SCORE: 11
5. BEING SO RESTLESS THAT IT IS HARD TO SIT STILL: SEVERAL DAYS
2. NOT BEING ABLE TO STOP OR CONTROL WORRYING: MORE THAN HALF THE DAYS
1. FEELING NERVOUS, ANXIOUS, OR ON EDGE: SEVERAL DAYS
6. BECOMING EASILY ANNOYED OR IRRITABLE: SEVERAL DAYS

## 2017-04-24 ASSESSMENT — PATIENT HEALTH QUESTIONNAIRE - PHQ9: 5. POOR APPETITE OR OVEREATING: MORE THAN HALF THE DAYS

## 2017-04-24 NOTE — MR AVS SNAPSHOT
After Visit Summary   4/24/2017    Tamera Kumar    MRN: [id number]           Patient Information     Date Of Birth          1940        Visit Information        Provider Department      [facility]2017 [facility]        Today's Diagnoses     Bipolar I disorder, most recent episode depressed (H)    -  1       Follow-ups after your visit        Your next 10 appointments already scheduled     May 11, 2017  3:30 PM CDT   (Arrive by 3:15 PM)   Return Visit with [facility]ou[facility] ([facility])    [address] [place] [address]   [phone]            May 19, 2017  9:30 AM CDT   (Arrive by 9:15 AM)   Return Visit with IAN Redd [facility]    [address] [place] [address]   [phone]            May 25, 2017  1:30 PM CDT   (Arrive by 1:15 PM)   Return Visit with Fouzia [facility] ([facility])    [address] [place] [address] [phone]              Who to contact     If you have questions or need follow up information about today's clinic visit or your schedule please contact [facility] directly at [phone].  Normal or non-critical lab and imaging results will be communicated to you by [employer], letter or phone within 4 business days after the clinic has received the results. If you do not hear from us within 7 days, please contact the clinic through [employer] or phone. If you have a critical or abnormal lab result, we will notify you by phone as soon as possible.  Submit refill requests through [employer] or call your pharmacy and they will forward the refill request to us. Please allow 3 business days for your refill to be completed.          Additional Information About Your Visit        [employer] Information     [employer] lets you send messages to your doctor, view your  "test results, renew your prescriptions, schedule appointments and more. To sign up, go to [email or web address] . Click on \"Log in\" on the left side of the screen, which will take you to the Welcome page. Then click on \"Sign up Now\" on the right side of the page.     You will be asked to enter the access code listed below, as well as some personal information. Please follow the directions to create your username and password.     Your access code is: [email or web address]  Expires: 2017  9:34 AM     Your access code will  in 90 days. If you need help or a new code, please call your [facility] clinic or [phone].        Care EveryWhere ID     This is your Care EveryWhere ID. This could be used by other organizations to access your [facility] medical records  [id number]         Blood Pressure from Last 3 Encounters:   17 138/88   03/10/17 110/60   17 124/60    Weight from Last 3 Encounters:   17 97 lb 12.8 oz (44.4 kg)   03/10/17 98 lb (44.5 kg)   17 100 lb 12.8 oz (45.7 kg)              Today, you had the following     No orders found for display       Primary Care Provider Office Phone # Fax #    Keith Lemons [phone]       [facility] [address] [place] [address]        Thank you!     Thank you for choosing [facility]  for your care. Our goal is always to provide you with excellent care. Hearing back from our patients is one way we can continue to improve our services. Please take a few minutes to complete the written survey that you may receive in the mail after your visit with us. Thank you!             Your Updated Medication List - Protect others around you: Learn how to safely use, store and throw away your medicines at www.disposemymeds.org.          This list is accurate as of: 17 11:59 PM.  Always use your most recent med list.                   Brand Name Dispense Instructions for use    alum & mag hydroxide-simethicone " 200-200-20 MG/5ML Susp suspension    MYLANTA/MAALOX     Take 30 mLs by mouth every 4 hours as needed for indigestion       amLODIPine 5 MG tablet    NORVASC    30 tablet    Take 1 tablet (5 mg) by mouth daily       buPROPion 200 MG 12 hr tablet    WELLBUTRIN SR    30 tablet    Take 1 tablet (200 mg) by mouth daily       cholecalciferol 5000 UNITS Caps     30 capsule    Take 1 capsule (5,000 Units) by mouth daily       * CLONIDINE HCL PO      Take 0.1 mg by mouth 2 times daily       * cloNIDine 0.1 MG tablet    CATAPRES    30 tablet    Take 1 tablet (0.1 mg) by mouth every 4 hours as needed (Diastolic BP >170) Patient taking 0.1 mg 2x daily 8am and 9pm at [place] according to their medication records.       donepezil 10 MG tablet    ARICEPT    30 tablet    Take 1 tablet (10 mg) by mouth At Bedtime       * DULoxetine 60 MG EC capsule    CYMBALTA    30 capsule    Take 1 capsule (60 mg) by mouth daily       * DULoxetine 30 MG EC capsule    CYMBALTA    30 capsule    Take 1 capsule daily in addition to 60 mg capsule to total 90 mg.       * GABAPENTIN PO      Take 50 mg by mouth as needed Also takes every 2hrs prn for anxeity 50ml       * gabapentin 400 MG capsule    NEURONTIN    30 capsule    Pt is to take 60 ml 4 x daily at 8 am, 12 pm, 4 pm, 9 pm       levothyroxine 75 MCG tablet    SYNTHROID/LEVOTHROID    30 tablet    Take 1 tablet (75 mcg) by mouth daily       memantine 10 MG tablet    NAMENDA    30 tablet    Take 1 tablet (10 mg) by mouth daily       multivitamin, therapeutic with minerals Tabs tablet     30 each    Take 1 tablet by mouth daily       PRILOSEC PO      Take 20 mg by mouth every morning       Sennosides 25 MG Tabs    SENNA MAXIMUM STRENGTH    30 tablet    Take 1 tablet by mouth daily       silver sulfADIAZINE 1 % cream    SILVADENE     Apply topically 2 times daily       tolterodine 4 MG 24 hr capsule    DETROL LA    30 capsule    Take 1 capsule (4 mg) by mouth daily       traZODone 100 MG tablet     DESYREL    30 tablet    Take 1 tablet (100 mg) by mouth At Bedtime       * Notice:  This list has 6 medication(s) that are the same as other medications prescribed for you. Read the directions carefully, and ask your doctor or other care provider to review them with you.

## 2017-04-28 ENCOUNTER — OFFICE VISIT (OUTPATIENT)
Dept: PSYCHIATRY | Facility: OTHER | Age: 77
End: 2017-04-28
Attending: NURSE PRACTITIONER
Payer: COMMERCIAL

## 2017-04-28 VITALS
HEIGHT: 60 IN | SYSTOLIC BLOOD PRESSURE: 138 MMHG | HEART RATE: 60 BPM | TEMPERATURE: 97.3 F | WEIGHT: 97.8 LBS | OXYGEN SATURATION: 99 % | DIASTOLIC BLOOD PRESSURE: 88 MMHG | BODY MASS INDEX: 19.2 KG/M2

## 2017-04-28 DIAGNOSIS — F33.1 MODERATE EPISODE OF RECURRENT MAJOR DEPRESSIVE DISORDER (H): ICD-10-CM

## 2017-04-28 DIAGNOSIS — F31.9 BIPOLAR 1 DISORDER (H): Primary | ICD-10-CM

## 2017-04-28 PROCEDURE — 99212 OFFICE O/P EST SF 10 MIN: CPT

## 2017-04-28 PROCEDURE — 99213 OFFICE O/P EST LOW 20 MIN: CPT | Performed by: NURSE PRACTITIONER

## 2017-04-28 ASSESSMENT — ANXIETY QUESTIONNAIRES
GAD7 TOTAL SCORE: 10
3. WORRYING TOO MUCH ABOUT DIFFERENT THINGS: MORE THAN HALF THE DAYS
6. BECOMING EASILY ANNOYED OR IRRITABLE: SEVERAL DAYS
4. TROUBLE RELAXING: MORE THAN HALF THE DAYS
1. FEELING NERVOUS, ANXIOUS, OR ON EDGE: SEVERAL DAYS
5. BEING SO RESTLESS THAT IT IS HARD TO SIT STILL: SEVERAL DAYS
2. NOT BEING ABLE TO STOP OR CONTROL WORRYING: MORE THAN HALF THE DAYS
7. FEELING AFRAID AS IF SOMETHING AWFUL MIGHT HAPPEN: SEVERAL DAYS
IF YOU CHECKED OFF ANY PROBLEMS ON THIS QUESTIONNAIRE, HOW DIFFICULT HAVE THESE PROBLEMS MADE IT FOR YOU TO DO YOUR WORK, TAKE CARE OF THINGS AT HOME, OR GET ALONG WITH OTHER PEOPLE: SOMEWHAT DIFFICULT

## 2017-04-28 ASSESSMENT — PAIN SCALES - GENERAL: PAINLEVEL: NO PAIN (0)

## 2017-04-28 NOTE — PROGRESS NOTES
"[facility]   Psychiatric Progress Note    Subjective   This is a 76 year old female with Bipolar 1 who present to clinic for f/u on her psychotropic mediations. Pt has been seeing Fouzia q 2 weeks and enjoys this \"Fouzia allows me to express myself\". She recently established with an [facility] worker with the plan to meet 2 X a week and get her out more. Tamera admits she lays down too much and does so as a means to escape. Discussed the use of a timer and allowing herself only 5-10 minutes. This she believes she could do. Socializing helps improve her mood as does coloring and playing cards. This is an improvement from her previous visit as nothing was making her feel better. Pt previous PHQ9 was 22, today it is 15. Her BRIGID 7 is 11, last one 10 no minimal change. Tamera admits she feels a little happier. Addressed plans for the summer, getting out to the events that make her happy. Also discussed mental wellness and the importance of preventing relapse.         DIagnoses:     Axis I: Bipolar 1. Moderately depressed    Attestation:  Patient has been seen and evaluated by me,  IAN Robles CNP          Interim History:   The patient's care was discussed with the treatment team and chart notes were reviewed.          Medications:      Current Outpatient Prescriptions   Medication     CLONIDINE HCL PO     buPROPion (WELLBUTRIN SR) 200 MG 12 hr tablet     levothyroxine (SYNTHROID/LEVOTHROID) 75 MCG tablet     DULoxetine (CYMBALTA) 30 MG EC capsule     traZODone (DESYREL) 100 MG tablet     gabapentin (NEURONTIN) 400 MG capsule     tolterodine (DETROL LA) 4 MG 24 hr capsule     donepezil (ARICEPT) 10 MG tablet     silver sulfADIAZINE (SILVADENE) 1 % cream     Omeprazole (PRILOSEC PO)     GABAPENTIN PO     DULoxetine (CYMBALTA) 60 MG EC capsule     cloNIDine (CATAPRES) 0.1 MG tablet     Sennosides (SENNA MAXIMUM STRENGTH) 25 MG TABS     memantine (NAMENDA) 10 MG tablet     multivitamin, therapeutic with minerals " "(THERA-VIT-M) TABS     cholecalciferol 5000 UNITS CAPS     alum & mag hydroxide-simethicone (MYLANTA/MAALOX) 200-200-20 MG/5ML SUSP     [DISCONTINUED] DULoxetine (CYMBALTA) 20 MG EC capsule     amLODIPine (NORVASC) 5 MG tablet     No current facility-administered medications for this visit.                Allergies:     Allergies   Allergen Reactions     Benadryl [Diphenhydramine] Unknown     Patient states she is allergic to benadryl     Depakote      Escitalopram      Suicidal ideation     Famotidine      Fluoxetine      Suicidal ideation     Hydrocodone      Lithium Swelling     Other [Seasonal Allergies]      Pet hair       Penicillins Nausea     Risperdal [Risperidone] Unknown     Patient states she is allergic to risperdal       Valproic Acid Unknown     \"seizures\"      10 point ROS negative.       Psychiatric Examination:   /88 (BP Location: Left leg, Patient Position: Chair, Cuff Size: Adult Regular)  Pulse 60  Temp 97.3  F (36.3  C) (Tympanic)  Ht 5' (1.524 m)  Wt 97 lb 12.8 oz (44.4 kg)  SpO2 99%  BMI 19.1 kg/m2  Weight is 97 lbs 12.8 oz  Body mass index is 19.1 kg/(m^2).    Appearance:  awake, alert, appeared older than stated age and neatly groomed  Attitude:  cooperative  Eye Contact:  good  Mood:  better  Affect:  mood congruent, smiles as I enter the room.  Speech:  clear, coherent  Psychomotor Behavior:  evidence of tics  Thought Process:  logical  Associations:  no loose associations  Thought Content:  no evidence of suicidal ideation or homicidal ideation, no evidence of psychotic thought, no auditory hallucinations present and no visual hallucinations present  Insight:  fair  Judgment:  fair  Oriented to:  time, person, and place  Attention Span and Concentration:  limited  Recent and Remote Memory:  limited  Fund of Knowledge: appropriate for level of education.  Muscle Strength and Tone: atrophy and and deconditioned  Gait and Station: Normal  Perception no perceptual disorder " noted.         Labs:   No results found for this or any previous visit (from the past 24 hour(s)).          Assessment/ Plan:     Continue on current medications.     Have pt set a timer to monitor the times she lays down during the day.

## 2017-04-28 NOTE — MR AVS SNAPSHOT
After Visit Summary   4/28/2017    Tamera Kumar    MRN: [id number]           Patient Information     Date Of Birth          1940        Visit Information        Provider Department      4/28/2017 9:00 AM Naomi Cook APRN CNP [facility]        Today's Diagnoses     Bipolar 1 disorder (H)    -  1    Moderate episode of recurrent major depressive disorder (H)          Care Instructions    Bipolar 1.    Continue taking your current medications.     Work with Fouzia twice a week    Establish with [facility] services, get out as much as possible.    Improvement noted.    Live in the present moment.    Use a timer when you lay down during the day. Give yourself 5-10 minutes to lay down then get up,        Follow-ups after your visit        Follow-up notes from your care team     Return in about 4 weeks (around 5/26/2017).      Your next 10 appointments already scheduled     May 11, 2017  3:30 PM CDT   (Arrive by 3:15 PM)   Return Visit with Fouzia Mason [facility] ([facility])    [address] [place] [address] [phone]            May 19, 2017  9:30 AM CDT   (Arrive by 9:15 AM)   Return Visit with IAN Redd [facility] ([facility])    [address] [place] [address] [phone]            May 25, 2017  1:30 PM CDT   (Arrive by 1:15 PM)   Return Visit with Fouzia Mason [facility]   [facility]    [address] [place] [address] [phone]              Who to contact     If you have questions or need follow up information about today's clinic visit or your schedule please contact [facility] directly at [phone].  Normal or non-critical lab and imaging results will be communicated to you by MyChart, letter or phone within 4 business days after the clinic has received the results. If you do not hear from us within 7  "days, please contact the clinic through [employer] or phone. If you have a critical or abnormal lab result, we will notify you by phone as soon as possible.  Submit refill requests through [employer] or call your pharmacy and they will forward the refill request to us. Please allow 3 business days for your refill to be completed.          Additional Information About Your Visit        [employer] Information     [employer] lets you send messages to your doctor, view your test results, renew your prescriptions, schedule appointments and more. To sign up, go to [email or web address] . Click on \"Log in\" on the left side of the screen, which will take you to the Welcome page. Then click on \"Sign up Now\" on the right side of the page.     You will be asked to enter the access code listed below, as well as some personal information. Please follow the directions to create your username and password.     Your access code is: [email or web address]  Expires: 2017  9:34 AM     Your access code will  in 90 days. If you need help or a new code, please call your [place] clinic or [phone].        Care EveryWhere ID     This is your Care EveryWhere ID. This could be used by other organizations to access your [place] medical records  [id number]        Your Vitals Were     Pulse Temperature Height Pulse Oximetry BMI (Body Mass Index)       60 97.3  F (36.3  C) (Tympanic) 5' (1.524 m) 99% 19.1 kg/m2        Blood Pressure from Last 3 Encounters:   17 138/88   03/10/17 110/60   17 124/60    Weight from Last 3 Encounters:   17 97 lb 12.8 oz (44.4 kg)   03/10/17 98 lb (44.5 kg)   17 100 lb 12.8 oz (45.7 kg)              Today, you had the following     No orders found for display         Today's Medication Changes          These changes are accurate as of: 17  9:34 AM.  If you have any questions, ask your nurse or doctor.               These medicines have changed or have updated prescriptions.        " Dose/Directions    * DULoxetine 60 MG EC capsule   Commonly known as:  CYMBALTA   This may have changed:  Another medication with the same name was removed. Continue taking this medication, and follow the directions you see here.   Used for:  Recurrent major depressive disorder, in partial remission (H)   Changed by:  Naomi Cook APRN CNP        Dose:  60 mg   Take 1 capsule (60 mg) by mouth daily   Quantity:  30 capsule   Refills:  1       * DULoxetine 30 MG EC capsule   Commonly known as:  CYMBALTA   This may have changed:  Another medication with the same name was removed. Continue taking this medication, and follow the directions you see here.   Used for:  Recurrent major depressive disorder, in partial remission (H)   Changed by:  Naomi Cook APRN CNP        Take 1 capsule daily in addition to 60 mg capsule to total 90 mg.   Quantity:  30 capsule   Refills:  3       * Notice:  This list has 2 medication(s) that are the same as other medications prescribed for you. Read the directions carefully, and ask your doctor or other care provider to review them with you.             Primary Care Provider Office Phone # Fax #    Keith Lemons [phone]       [facility] [address] [place] [address]        Thank you!     Thank you for choosing [facility]  for your care. Our goal is always to provide you with excellent care. Hearing back from our patients is one way we can continue to improve our services. Please take a few minutes to complete the written survey that you may receive in the mail after your visit with us. Thank you!             Your Updated Medication List - Protect others around you: Learn how to safely use, store and throw away your medicines at www.disposemymeds.org.          This list is accurate as of: 4/28/17  9:34 AM.  Always use your most recent med list.                   Brand Name Dispense Instructions for use    alum & mag  hydroxide-simethicone 200-200-20 MG/5ML Susp suspension    MYLANTA/MAALOX     Take 30 mLs by mouth every 4 hours as needed for indigestion       amLODIPine 5 MG tablet    NORVASC    30 tablet    Take 1 tablet (5 mg) by mouth daily       buPROPion 200 MG 12 hr tablet    WELLBUTRIN SR    30 tablet    Take 1 tablet (200 mg) by mouth daily       cholecalciferol 5000 UNITS Caps     30 capsule    Take 1 capsule (5,000 Units) by mouth daily       * CLONIDINE HCL PO      Take 0.1 mg by mouth 2 times daily       * cloNIDine 0.1 MG tablet    CATAPRES    30 tablet    Take 1 tablet (0.1 mg) by mouth every 4 hours as needed (Diastolic BP >170) Patient taking 0.1 mg 2x daily 8am and 9pm at [place] according to their medication records.       donepezil 10 MG tablet    ARICEPT    30 tablet    Take 1 tablet (10 mg) by mouth At Bedtime       * DULoxetine 60 MG EC capsule    CYMBALTA    30 capsule    Take 1 capsule (60 mg) by mouth daily       * DULoxetine 30 MG EC capsule    CYMBALTA    30 capsule    Take 1 capsule daily in addition to 60 mg capsule to total 90 mg.       * GABAPENTIN PO      Take 50 mg by mouth as needed Also takes every 2hrs prn for anxeity 50ml       * gabapentin 400 MG capsule    NEURONTIN    30 capsule    Pt is to take 60 ml 4 x daily at 8 am, 12 pm, 4 pm, 9 pm       levothyroxine 75 MCG tablet    SYNTHROID/LEVOTHROID    30 tablet    Take 1 tablet (75 mcg) by mouth daily       memantine 10 MG tablet    NAMENDA    30 tablet    Take 1 tablet (10 mg) by mouth daily       multivitamin, therapeutic with minerals Tabs tablet     30 each    Take 1 tablet by mouth daily       PRILOSEC PO      Take 20 mg by mouth every morning       Sennosides 25 MG Tabs    SENNA MAXIMUM STRENGTH    30 tablet    Take 1 tablet by mouth daily       silver sulfADIAZINE 1 % cream    SILVADENE     Apply topically 2 times daily       tolterodine 4 MG 24 hr capsule    DETROL LA    30 capsule    Take 1 capsule (4 mg) by mouth daily        traZODone 100 MG tablet    DESYREL    30 tablet    Take 1 tablet (100 mg) by mouth At Bedtime       * Notice:  This list has 6 medication(s) that are the same as other medications prescribed for you. Read the directions carefully, and ask your doctor or other care provider to review them with you.

## 2017-04-28 NOTE — PATIENT INSTRUCTIONS
Bipolar 1.    Continue taking your current medications.     Work with Fouzia twice a week    Establish with [facility] services, get out as much as possible.    Improvement noted.    Live in the present moment.    Use a timer when you lay down during the day. Give yourself 5-10 minutes to lay down then get up,

## 2017-04-28 NOTE — NURSING NOTE
Chief Complaint   Patient presents with     RECHECK     Mental health       Initial /88 (BP Location: Left leg, Patient Position: Chair, Cuff Size: Adult Regular)  Pulse 60  Temp 97.3  F (36.3  C) (Tympanic)  Ht 5' (1.524 m)  Wt 97 lb 12.8 oz (44.4 kg)  SpO2 99%  BMI 19.1 kg/m2 Estimated body mass index is 19.1 kg/(m^2) as calculated from the following:    Height as of this encounter: 5' (1.524 m).    Weight as of this encounter: 97 lb 12.8 oz (44.4 kg).  Medication Reconciliation: complete   Colt Solorio LPN

## 2017-04-29 ASSESSMENT — PATIENT HEALTH QUESTIONNAIRE - PHQ9: SUM OF ALL RESPONSES TO PHQ QUESTIONS 1-9: 15

## 2017-04-29 ASSESSMENT — ANXIETY QUESTIONNAIRES: GAD7 TOTAL SCORE: 10

## 2017-05-01 NOTE — PROGRESS NOTES
Progress Note    Client Name: Tamera Kumar  Date: April 24, 2017         Service Type: Individual      Session Start Time: 9:59  Session End Time: 10:55      Session Length: 56 minutes     Session #: 3     Attendees: Client attended alone    DSM-V Diagnoses:   296.53 Bipolar I Disorder Current or Most Recent Episode Depressed, Severe    DA Date: 1/23/17    Treatment Plan Due: 5/13/17  PHQ-9 :   PHQ-9 SCORE 3/10/2017 4/24/2017 4/28/2017   Total Score 22 14 15      BRIGID-7 :    BRIGID-7 SCORE 3/10/2017 4/24/2017 4/28/2017   Total Score 19 11 10           DATA      Progress Since Last Session (Related to Symptoms / Goals / Homework):   Symptoms: somewhat improved    Homework: Completed in session     Current / Ongoing Stressors and Concerns:   Tamera was more restless today. She reported that she has begun working with and [employer] worker and this is going well. Tamera also stated that her brother-in-law passed away recently. She continues to discuss depressive, maladaptive thought processes that affect her daily life. She is reporting isolating less and trying to make more connections with others.     Treatment Objective(s) Addressed in This Session:   Objective 1A (Client Action)                  Client will learn IMR and CBT skills to use for reducing depressed mood.     Intervention:   Provided psycho-education utilizing CBT, NATs and cognitive restructuring.      Response to Interventions:   Tamera stated she understood this information and that she would use it at home.      ASSESSMENT: Current Emotional / Mental Status (status of significant symptoms):   Risk status (Self / Other harm or suicidal ideation)   Client denies current fears or concerns for personal safety.   Client denies current or recent suicidal ideation.   Client denies current or recent homicidal ideation or behaviors.   Client denies current or recent self injurious behavior or ideation.   Client denies  other safety concerns.   A safety and risk management plan has not been developed at this time, however client was given the after-hours number / 911 should there be a change in any of these risk factors.     Appearance:   Appropriate    Eye Contact:   Fair    Psychomotor Behavior: Restless    Attitude:   Cooperative    Orientation:   All   Speech    Rate / Production: Normal     Volume:  Normal    Mood:    Anxious  Depressed    Affect:    Worrisome    Thought Content:  Rumination    Thought Form:  Coherent  Circumstantial   Insight:    Poor         Medication Compliance:   Yes     Changes in Health Issues:   None reported     Chemical Use Review:   Substance Use: Chemical use reviewed, no active concerns identified      Tobacco Use: No current tobacco use.       Collateral Reports Completed:   Not Applicable    PLAN: (Client Tasks / Therapist Tasks / Other)  Tamera was asked to practice the skills at home and to return for follow-up appointments.    CAROLINE SandersSW

## 2017-05-02 ASSESSMENT — PATIENT HEALTH QUESTIONNAIRE - PHQ9: SUM OF ALL RESPONSES TO PHQ QUESTIONS 1-9: 14

## 2017-05-02 ASSESSMENT — ANXIETY QUESTIONNAIRES: GAD7 TOTAL SCORE: 11

## 2017-05-03 DIAGNOSIS — F32.A DEPRESSION, UNSPECIFIED DEPRESSION TYPE: Primary | ICD-10-CM

## 2017-05-05 RX ORDER — BUPROPION HYDROCHLORIDE 150 MG/1
TABLET, EXTENDED RELEASE ORAL
Qty: 30 TABLET | Refills: 0 | Status: SHIPPED | OUTPATIENT
Start: 2017-05-05 | End: 2017-07-21 | Stop reason: DRUGHIGH

## 2017-05-05 NOTE — TELEPHONE ENCOUNTER
wellbutrin       Last Written Prescription Date: 3/10/17  Last Fill Quantity: 30; # refills: 3  Last Office Visit with G, [facility] or [facility] prescribing provider:  4/28/17   Next 5 appointments (look out 90 days)     May 11, 2017  3:30 PM CDT   (Arrive by 3:15 PM)   Return Visit with Fouzia Mason [facility]   [facility] [place] [facility]    [address] [place] [address]  [place] [address]   [phone]            May 19, 2017  9:30 AM CDT   (Arrive by 9:15 AM)   Return Visit with IAN Redd [facility] [place] ([facility] [place] [facility])    [address] [place] [address]  [place] [address] [phone]            May 25, 2017  1:30 PM CDT   (Arrive by 1:15 PM)   Return Visit with Fouzia Mason [facility]   [facility] [place] [facility]    [address] [place] [address]  [place] [address] [phone]                   Last PHQ-9 score on record=   PHQ-9 SCORE 4/28/2017   Total Score 15       Lab Results   Component Value Date    AST 12 03/12/2016     Lab Results   Component Value Date    ALT 20 03/12/2016

## 2017-05-08 DIAGNOSIS — F33.1 MODERATE EPISODE OF RECURRENT MAJOR DEPRESSIVE DISORDER (H): ICD-10-CM

## 2017-05-08 DIAGNOSIS — F33.41 RECURRENT MAJOR DEPRESSIVE DISORDER, IN PARTIAL REMISSION (H): ICD-10-CM

## 2017-05-10 NOTE — TELEPHONE ENCOUNTER
wellbutrin and cymbalta       Last Written Prescription Date: 3/10/17  Last Fill Quantity: 30; # refills: 3  Last Office Visit with G, [facility] or [facility] prescribing provider:  4/28/17   Next 5 appointments (look out 90 days)     May 11, 2017  3:30 PM CDT   (Arrive by 3:15 PM)   Return Visit with Fouzia Mason [facility] ([facility] [place] [facility])    [address] [place], [place] [address] [place] [address]   [phone]            May 25, 2017  1:30 PM CDT   (Arrive by 1:15 PM)   Return Visit with Fouzia Mason [facility] ([facility] [place] [facility])    [address] [place], [place] [address] [place] [address]   [phone]            May 26, 2017  9:00 AM CDT   (Arrive by 8:45 AM)   Return Visit with IAN Redd [facility] [place] ([facility] [place] [facility])    [address] [place] [address]  [place] [address]   [phone]                   Last PHQ-9 score on record=   PHQ-9 SCORE 4/28/2017   Total Score 15       Lab Results   Component Value Date    AST 12 03/12/2016     Lab Results   Component Value Date    ALT 20 03/12/2016

## 2017-05-11 ENCOUNTER — OFFICE VISIT (OUTPATIENT)
Dept: PSYCHOLOGY | Facility: OTHER | Age: 77
End: 2017-05-11
Attending: SOCIAL WORKER
Payer: COMMERCIAL

## 2017-05-11 DIAGNOSIS — F31.30 BIPOLAR I DISORDER, MOST RECENT EPISODE DEPRESSED (H): Primary | ICD-10-CM

## 2017-05-11 PROCEDURE — 90837 PSYTX W PT 60 MINUTES: CPT | Performed by: SOCIAL WORKER

## 2017-05-11 RX ORDER — BUPROPION HYDROCHLORIDE 200 MG/1
TABLET, EXTENDED RELEASE ORAL
Qty: 30 TABLET | Refills: 4 | Status: SHIPPED | OUTPATIENT
Start: 2017-05-11 | End: 2017-06-05

## 2017-05-11 RX ORDER — DULOXETIN HYDROCHLORIDE 30 MG/1
CAPSULE, DELAYED RELEASE ORAL
Qty: 30 CAPSULE | Refills: 4 | Status: SHIPPED | OUTPATIENT
Start: 2017-05-11 | End: 2017-08-11 | Stop reason: DRUGHIGH

## 2017-05-11 ASSESSMENT — PATIENT HEALTH QUESTIONNAIRE - PHQ9: 5. POOR APPETITE OR OVEREATING: MORE THAN HALF THE DAYS

## 2017-05-11 ASSESSMENT — ANXIETY QUESTIONNAIRES
IF YOU CHECKED OFF ANY PROBLEMS ON THIS QUESTIONNAIRE, HOW DIFFICULT HAVE THESE PROBLEMS MADE IT FOR YOU TO DO YOUR WORK, TAKE CARE OF THINGS AT HOME, OR GET ALONG WITH OTHER PEOPLE: SOMEWHAT DIFFICULT
GAD7 TOTAL SCORE: 11
6. BECOMING EASILY ANNOYED OR IRRITABLE: MORE THAN HALF THE DAYS
2. NOT BEING ABLE TO STOP OR CONTROL WORRYING: MORE THAN HALF THE DAYS
5. BEING SO RESTLESS THAT IT IS HARD TO SIT STILL: MORE THAN HALF THE DAYS
1. FEELING NERVOUS, ANXIOUS, OR ON EDGE: SEVERAL DAYS
3. WORRYING TOO MUCH ABOUT DIFFERENT THINGS: SEVERAL DAYS
7. FEELING AFRAID AS IF SOMETHING AWFUL MIGHT HAPPEN: SEVERAL DAYS

## 2017-05-11 NOTE — MR AVS SNAPSHOT
After Visit Summary   5/11/2017    Tamera Kumar    MRN: [id number]           Patient Information     Date Of Birth          1940        Visit Information        Provider Department      5/11/2017 3:30 PM [facility]        Today's Diagnoses     Bipolar I disorder, most recent episode depressed (H)    -  1       Follow-ups after your visit        Your next 10 appointments already scheduled     Paresh 15, 2017 11:00 AM CDT   (Arrive by 10:45 AM)   Return Visit with Fouzia [facility] [place] [facility]    [address] [place] [address]   [phone]            Jun 29, 2017 11:30 AM CDT   (Arrive by 11:15 AM)   Return Visit with Fouzia [facility]    [address] [place] [address]   [phone]            Jun 30, 2017 10:00 AM CDT   (Arrive by 9:45 AM)   Return Visit with IAN Redd [facility] [place] [facility]    [address] [place] [address] [phone]              Who to contact     If you have questions or need follow up information about today's clinic visit or your schedule please contact [place] [facility] directly at [phone].  Normal or non-critical lab and imaging results will be communicated to you by [email or web address], letter or phone within 4 business days after the clinic has received the results. If you do not hear from us within 7 days, please contact the clinic through MyChart or phone. If you have a critical or abnormal lab result, we will notify you by phone as soon as possible.  Submit refill requests through [employer] or call your pharmacy and they will forward the refill request to us. Please allow 3 business days for your refill to be completed.          Additional Information About Your Visit        [email or web address] Information     [employer] lets you send messages to your doctor, view your  "test results, renew your prescriptions, schedule appointments and more. To sign up, go to [email or web address] . Click on \"Log in\" on the left side of the screen, which will take you to the Welcome page. Then click on \"Sign up Now\" on the right side of the page.     You will be asked to enter the access code listed below, as well as some personal information. Please follow the directions to create your username and password.     Your access code is: [email or web address]  Expires: 2017  9:34 AM     Your access code will  in 90 days. If you need help or a new code, please call your [place] clinic or [phone].        Care EveryWhere ID     This is your Care EveryWhere ID. This could be used by other organizations to access your [place] medical records  [id number]         Blood Pressure from Last 3 Encounters:   17 138/76   17 138/88   03/10/17 110/60    Weight from Last 3 Encounters:   17 98 lb (44.5 kg)   17 97 lb 12.8 oz (44.4 kg)   03/10/17 98 lb (44.5 kg)              Today, you had the following     No orders found for display       Primary Care Provider Office Phone # Fax #    Keith Lemons [phone]       [facility] [address] [place] [address]        Thank you!     Thank you for choosing [facility]  for your care. Our goal is always to provide you with excellent care. Hearing back from our patients is one way we can continue to improve our services. Please take a few minutes to complete the written survey that you may receive in the mail after your visit with us. Thank you!             Your Updated Medication List - Protect others around you: Learn how to safely use, store and throw away your medicines at www.disposemymeds.org.          This list is accurate as of: 17 11:59 PM.  Always use your most recent med list.                   Brand Name Dispense Instructions for use    alum & mag hydroxide-simethicone 200-200-20 MG/5ML " Susp suspension    MYLANTA/MAALOX     Take 30 mLs by mouth every 4 hours as needed for indigestion       amLODIPine 5 MG tablet    NORVASC    30 tablet    Take 1 tablet (5 mg) by mouth daily       * buPROPion 150 MG 12 hr tablet    WELLBUTRIN SR    30 tablet    Take one (1) tablet BY MOUTH daily       * buPROPion 200 MG 12 hr tablet    WELLBUTRIN SR    30 tablet    take one in the a.m.       cholecalciferol 5000 UNITS Caps     30 capsule    Take 1 capsule (5,000 Units) by mouth daily       CLONIDINE HCL PO      Take 0.1 mg by mouth 2 times daily       donepezil 10 MG tablet    ARICEPT    30 tablet    Take 1 tablet (10 mg) by mouth At Bedtime       * DULoxetine 60 MG EC capsule    CYMBALTA    30 capsule    Take 1 capsule (60 mg) by mouth daily       * DULoxetine 30 MG EC capsule    CYMBALTA    30 capsule    Take one (1) capsule BY MOUTH daily with 60mg       GABAPENTIN PO      Take 50 mg by mouth as needed Also takes every 2hrs prn for anxeity 50ml       levothyroxine 75 MCG tablet    SYNTHROID/LEVOTHROID    30 tablet    Take 1 tablet (75 mcg) by mouth daily       memantine 10 MG tablet    NAMENDA    30 tablet    Take 1 tablet (10 mg) by mouth daily       multivitamin, therapeutic with minerals Tabs tablet     30 each    Take 1 tablet by mouth daily       PRILOSEC PO      Take 20 mg by mouth every morning       Sennosides 25 MG Tabs    SENNA MAXIMUM STRENGTH    30 tablet    Take 1 tablet by mouth daily       silver sulfADIAZINE 1 % cream    SILVADENE     Apply topically 2 times daily       tolterodine 4 MG 24 hr capsule    DETROL LA    30 capsule    Take 1 capsule (4 mg) by mouth daily       traZODone 100 MG tablet    DESYREL    30 tablet    Take 1 tablet (100 mg) by mouth At Bedtime       * Notice:  This list has 4 medication(s) that are the same as other medications prescribed for you. Read the directions carefully, and ask your doctor or other care provider to review them with you.

## 2017-05-15 DIAGNOSIS — I10 ESSENTIAL HYPERTENSION: ICD-10-CM

## 2017-05-16 RX ORDER — CLONIDINE HYDROCHLORIDE 0.1 MG/1
TABLET ORAL
Qty: 30 TABLET | Refills: 2 | Status: SHIPPED | OUTPATIENT
Start: 2017-05-16 | End: 2017-06-19

## 2017-05-25 ENCOUNTER — OFFICE VISIT (OUTPATIENT)
Dept: PSYCHOLOGY | Facility: OTHER | Age: 77
End: 2017-05-25
Attending: SOCIAL WORKER
Payer: COMMERCIAL

## 2017-05-25 DIAGNOSIS — F31.30 BIPOLAR I DISORDER, MOST RECENT EPISODE DEPRESSED (H): Primary | ICD-10-CM

## 2017-05-25 PROCEDURE — 90837 PSYTX W PT 60 MINUTES: CPT | Performed by: SOCIAL WORKER

## 2017-05-25 ASSESSMENT — ANXIETY QUESTIONNAIRES
3. WORRYING TOO MUCH ABOUT DIFFERENT THINGS: SEVERAL DAYS
2. NOT BEING ABLE TO STOP OR CONTROL WORRYING: SEVERAL DAYS
GAD7 TOTAL SCORE: 4
5. BEING SO RESTLESS THAT IT IS HARD TO SIT STILL: NOT AT ALL
6. BECOMING EASILY ANNOYED OR IRRITABLE: NOT AT ALL
1. FEELING NERVOUS, ANXIOUS, OR ON EDGE: SEVERAL DAYS
IF YOU CHECKED OFF ANY PROBLEMS ON THIS QUESTIONNAIRE, HOW DIFFICULT HAVE THESE PROBLEMS MADE IT FOR YOU TO DO YOUR WORK, TAKE CARE OF THINGS AT HOME, OR GET ALONG WITH OTHER PEOPLE: SOMEWHAT DIFFICULT
7. FEELING AFRAID AS IF SOMETHING AWFUL MIGHT HAPPEN: NOT AT ALL

## 2017-05-25 ASSESSMENT — PATIENT HEALTH QUESTIONNAIRE - PHQ9: 5. POOR APPETITE OR OVEREATING: SEVERAL DAYS

## 2017-05-25 NOTE — MR AVS SNAPSHOT
After Visit Summary   5/25/2017    Tamera Kumar    MRN: [id number]           Patient Information     Date Of Birth          1940        Visit Information        Provider Department      5[facility]25/2017 1:30 [facility]        Today's Diagnoses     Bipolar I disorder, most recent episode depressed (H)    -  1       Follow-ups after your visit        Your next 10 appointments already scheduled     Paresh 15, 2017 11:00 AM CDT   (Arrive by 10:45 AM)   Return Visit with Fouzia [facility])    [address] [place] [address]   [phone]            Jun 29, 2017 11:30 AM CDT   (Arrive by 11:15 AM)   Return Visit with Fouzia Pappase [facility] [place] [facility] ([facility])    [address] [address]   [phone]            Jun 30, 2017 10:00 AM CDT   (Arrive by 9:45 AM)   Return Visit with IAN Redd [facility] ([facility])    [address] [place] [address] [phone]              Who to contact     If you have questions or need follow up information about today's clinic visit or your schedule please contact [place] [facility] directly at [phone].  Normal or non-critical lab and imaging results will be communicated to you by [employer], letter or phone within 4 business days after the clinic has received the results. If you do not hear from us within 7 days, please contact the clinic through MyChart or phone. If you have a critical or abnormal lab result, we will notify you by phone as soon as possible.  Submit refill requests through [employer] or call your pharmacy and they will forward the refill request to us. Please allow 3 business days for your refill to be completed.          Additional Information About Your Visit        [employer] Information     [employer] lets you send messages to your doctor, view your  "test results, renew your prescriptions, schedule appointments and more. To sign up, go to [email or web address] . Click on \"Log in\" on the left side of the screen, which will take you to the Welcome page. Then click on \"Sign up Now\" on the right side of the page.     You will be asked to enter the access code listed below, as well as some personal information. Please follow the directions to create your username and password.     Your access code is: [email or web address]  Expires: 2017  9:34 AM     Your access code will  in 90 days. If you need help or a new code, please call your [place] clinic or [phone].        Care EveryWhere ID     This is your Care EveryWhere ID. This could be used by other organizations to access your [place] medical records  [id number]         Blood Pressure from Last 3 Encounters:   17 138/76   17 138/88   03/10/17 110/60    Weight from Last 3 Encounters:   17 98 lb (44.5 kg)   17 97 lb 12.8 oz (44.4 kg)   03/10/17 98 lb (44.5 kg)              Today, you had the following     No orders found for display       Primary Care Provider Office Phone # Fax #    Keith Lemons [phone]       [facility] [address] [place] [address]        Thank you!     Thank you for choosing [facility]  for your care. Our goal is always to provide you with excellent care. Hearing back from our patients is one way we can continue to improve our services. Please take a few minutes to complete the written survey that you may receive in the mail after your visit with us. Thank you!             Your Updated Medication List - Protect others around you: Learn how to safely use, store and throw away your medicines at www.disposemymeds.org.          This list is accurate as of: 17 11:59 PM.  Always use your most recent med list.                   Brand Name Dispense Instructions for use    alum & mag hydroxide-simethicone 200-200-20 MG/5ML " Susp suspension    MYLANTA/MAALOX     Take 30 mLs by mouth every 4 hours as needed for indigestion       amLODIPine 5 MG tablet    NORVASC    30 tablet    Take 1 tablet (5 mg) by mouth daily       * buPROPion 150 MG 12 hr tablet    WELLBUTRIN SR    30 tablet    Take one (1) tablet BY MOUTH daily       * buPROPion 200 MG 12 hr tablet    WELLBUTRIN SR    30 tablet    take one in the a.m.       cholecalciferol 5000 UNITS Caps     30 capsule    Take 1 capsule (5,000 Units) by mouth daily       * CLONIDINE HCL PO      Take 0.1 mg by mouth 2 times daily       * cloNIDine 0.1 MG tablet    CATAPRES    30 tablet    Take one (1) tablet BY MOUTH every four (4) HOURS as needed patient takes twice daily according to [place] med records       donepezil 10 MG tablet    ARICEPT    30 tablet    Take 1 tablet (10 mg) by mouth At Bedtime       * DULoxetine 60 MG EC capsule    CYMBALTA    30 capsule    Take 1 capsule (60 mg) by mouth daily       * DULoxetine 30 MG EC capsule    CYMBALTA    30 capsule    Take one (1) capsule BY MOUTH daily with 60mg       GABAPENTIN PO      Take 50 mg by mouth as needed Also takes every 2hrs prn for anxeity 50ml       levothyroxine 75 MCG tablet    SYNTHROID/LEVOTHROID    30 tablet    Take 1 tablet (75 mcg) by mouth daily       memantine 10 MG tablet    NAMENDA    30 tablet    Take 1 tablet (10 mg) by mouth daily       multivitamin, therapeutic with minerals Tabs tablet     30 each    Take 1 tablet by mouth daily       PRILOSEC PO      Take 20 mg by mouth every morning       Sennosides 25 MG Tabs    SENNA MAXIMUM STRENGTH    30 tablet    Take 1 tablet by mouth daily       silver sulfADIAZINE 1 % cream    SILVADENE     Apply topically 2 times daily       tolterodine 4 MG 24 hr capsule    DETROL LA    30 capsule    Take 1 capsule (4 mg) by mouth daily       traZODone 100 MG tablet    DESYREL    30 tablet    Take 1 tablet (100 mg) by mouth At Bedtime       * Notice:  This list has 6 medication(s) that  are the same as other medications prescribed for you. Read the directions carefully, and ask your doctor or other care provider to review them with you.

## 2017-05-26 ENCOUNTER — OFFICE VISIT (OUTPATIENT)
Dept: PSYCHIATRY | Facility: OTHER | Age: 77
End: 2017-05-26
Attending: NURSE PRACTITIONER
Payer: COMMERCIAL

## 2017-05-26 VITALS
DIASTOLIC BLOOD PRESSURE: 76 MMHG | HEART RATE: 68 BPM | HEIGHT: 60 IN | WEIGHT: 98 LBS | SYSTOLIC BLOOD PRESSURE: 138 MMHG | TEMPERATURE: 97.6 F | BODY MASS INDEX: 19.24 KG/M2

## 2017-05-26 DIAGNOSIS — F31.9 BIPOLAR 1 DISORDER (H): ICD-10-CM

## 2017-05-26 DIAGNOSIS — E03.9 HYPOTHYROIDISM, UNSPECIFIED TYPE: ICD-10-CM

## 2017-05-26 DIAGNOSIS — F41.9 ANXIETY: Primary | ICD-10-CM

## 2017-05-26 LAB — TSH SERPL DL<=0.005 MIU/L-ACNC: 0.91 MU/L (ref 0.4–4)

## 2017-05-26 PROCEDURE — 84443 ASSAY THYROID STIM HORMONE: CPT | Mod: ZL | Performed by: NURSE PRACTITIONER

## 2017-05-26 PROCEDURE — 99212 OFFICE O/P EST SF 10 MIN: CPT

## 2017-05-26 PROCEDURE — 36415 COLL VENOUS BLD VENIPUNCTURE: CPT | Mod: ZL | Performed by: NURSE PRACTITIONER

## 2017-05-26 PROCEDURE — 99214 OFFICE O/P EST MOD 30 MIN: CPT | Performed by: NURSE PRACTITIONER

## 2017-05-26 RX ORDER — GABAPENTIN 100 MG/1
100 CAPSULE ORAL 3 TIMES DAILY
Qty: 90 CAPSULE | Refills: 3 | Status: SHIPPED | OUTPATIENT
Start: 2017-05-26 | End: 2017-09-11

## 2017-05-26 ASSESSMENT — PAIN SCALES - GENERAL: PAINLEVEL: NO PAIN (0)

## 2017-05-26 ASSESSMENT — ANXIETY QUESTIONNAIRES
5. BEING SO RESTLESS THAT IT IS HARD TO SIT STILL: NOT AT ALL
1. FEELING NERVOUS, ANXIOUS, OR ON EDGE: SEVERAL DAYS
GAD7 TOTAL SCORE: 4
3. WORRYING TOO MUCH ABOUT DIFFERENT THINGS: SEVERAL DAYS
IF YOU CHECKED OFF ANY PROBLEMS ON THIS QUESTIONNAIRE, HOW DIFFICULT HAVE THESE PROBLEMS MADE IT FOR YOU TO DO YOUR WORK, TAKE CARE OF THINGS AT HOME, OR GET ALONG WITH OTHER PEOPLE: SOMEWHAT DIFFICULT
2. NOT BEING ABLE TO STOP OR CONTROL WORRYING: SEVERAL DAYS
6. BECOMING EASILY ANNOYED OR IRRITABLE: NOT AT ALL
7. FEELING AFRAID AS IF SOMETHING AWFUL MIGHT HAPPEN: NOT AT ALL

## 2017-05-26 ASSESSMENT — PATIENT HEALTH QUESTIONNAIRE - PHQ9: 5. POOR APPETITE OR OVEREATING: SEVERAL DAYS

## 2017-05-26 NOTE — MR AVS SNAPSHOT
After Visit Summary   5/26/2017    Tamera Kumar    MRN: [id number]           Patient Information     Date Of Birth          1940        Visit Information        Provider Department      5/26/2017 9:00 AM Naomi Cook APRN [facility]        Today's Diagnoses     Anxiety    -  1    Hypothyroidism, unspecified type        Bipolar 1 disorder (H)          Care Instructions    1. Anxiety    - gabapentin (NEURONTIN) 100 MG capsule; Take 1 capsule (100 mg) by mouth 3 times daily  Dispense: 90 capsule; Refill: 3    2. Hypothyroidism, unspecified type    - TSH with free T4 reflex    3. Bipolar 1 disorder (H)      Continue on your current medications.      Make sure you eat or have a shake in between meals.      Go to all activities.      Remember positive thinking/no stinkin thinkin.           Eating Healthy: Good Nutrition Quiz  To test your nutrition knowledge, answer the questions below as either True or False.               True False      1. There are many non-dairy sources of calcium.     2. Low-fat foods are always better.     3. Fiber isn t important.     4. You need to watch portion sizes only when eating at home.     5. The outside aisles of the grocery store are the best places to shop.       Answers  1. TRUE. While dairy products have the most calcium, you can also get this mineral from other foods, too. Try calcium-fortified juices, cereals, breads, rice milk, or almond milk. Other sources of calcium are canned fish, some beans, and dark, leafy greens. Soybeans and some soy items are also good options. These include soy yogurt, tempeh, and tofu made with calcium sulfate.  2. FALSE. Just because a food is low-fat or fat-free does not mean it s always a better nutritional choice. For instance, fat-free cookies have the same amount of sugar and calories, or often even more, than regular cookies. Read labels.  3. FALSE. High-fiber foods help keep your digestive system  healthy. Try to get 25 to 38 grams of fiber a day. Also, remember to drink plenty of water to prevent constipation.  4. FALSE. Portion control is just as important when you re eating out. Many restaurants serve extra-large portions. So split your entree with someone at the table. Or ask for a take-home box. Then put half of your entree in it right away, before you start eating.  5. TRUE. This is where the fresh foods tend to be. These include fruits, vegetables, grains, and dairy. Processed foods are more likely to be on the inside aisles. When shopping these aisles, read labels extra carefully.    7629-2038 [employer]. [address] [place] [address], [place] [address]. All rights reserved. This information is not intended as a substitute for professional medical care. Always follow your healthcare professional's instructions.                Follow-ups after your visit        Follow-up notes from your care team     Return in about 4 weeks (around 6/23/2017) for Lab Work.      Your next 10 appointments already scheduled     Paresh 15, 2017 11:00 AM CDT   (Arrive by 10:45 AM)   Return Visit with Fouzia Mason [facility] [place] [facility])    [address] [place] [id number] [phone]            Jun 29, 2017 11:30 AM CDT   (Arrive by 11:15 AM)   Return Visit with Fouzia Mason [facility] [facility])    [address] [place] [id number] [phone]            Jun 30, 2017 10:00 AM CDT   (Arrive by 9:45 AM)   Return Visit with IAN Redd CNP   [facility] ([facility])    [address] [place] [address] [phone]              Who to contact     If you have questions or need follow up information about today's clinic visit or your schedule please contact [facility] directly at [phone].  Normal or non-critical lab and imaging results will be communicated to you  "by [employer], letter or phone within 4 business days after the clinic has received the results. If you do not hear from us within 7 days, please contact the clinic through [employer] or phone. If you have a critical or abnormal lab result, we will notify you by phone as soon as possible.  Submit refill requests through [employer] or call your pharmacy and they will forward the refill request to us. Please allow 3 business days for your refill to be completed.          Additional Information About Your Visit        [employer] [facility] Information     [employer] lets you send messages to your doctor, view your test results, renew your prescriptions, schedule appointments and more. To sign up, go to [email or web address] . Click on \"Log in\" on the left side of the screen, which will take you to the Welcome page. Then click on \"Sign up Now\" on the right side of the page.     You will be asked to enter the access code listed below, as well as some personal information. Please follow the directions to create your username and password.     Your access code is: [email or web address]  Expires: 2017  9:34 AM     Your access code will  in 90 days. If you need help or a new code, please call your [place] clinic or [phone].        Care EveryWhere ID     This is your Care EveryWhere ID. This could be used by other organizations to access your [place] medical records  [id number]        Your Vitals Were     Pulse Temperature Height BMI (Body Mass Index)          68 97.6  F (36.4  C) (Tympanic) 5' (1.524 m) 19.14 kg/m2         Blood Pressure from Last 3 Encounters:   17 138/76   17 138/88   03/10/17 110/60    Weight from Last 3 Encounters:   17 98 lb (44.5 kg)   17 97 lb 12.8 oz (44.4 kg)   03/10/17 98 lb (44.5 kg)              We Performed the Following     TSH with free T4 reflex          Today's Medication Changes          These changes are accurate as of: 17 10:37 AM.  If you have any questions, ask your " nurse or doctor.               These medicines have changed or have updated prescriptions.        Dose/Directions    * GABAPENTIN PO   This may have changed:  Another medication with the same name was changed. Make sure you understand how and when to take each.   Changed by:  Naomi Cook APRN CNP        Dose:  50 mg   Take 50 mg by mouth as needed Also takes every 2hrs prn for anxeity 50ml   Refills:  0       * gabapentin 100 MG capsule   Commonly known as:  NEURONTIN   This may have changed:    - medication strength  - how much to take  - how to take this  - when to take this  - additional instructions   Used for:  Anxiety   Changed by:  Naomi Cook APRN CNP        Dose:  100 mg   Take 1 capsule (100 mg) by mouth 3 times daily   Quantity:  90 capsule   Refills:  3       * Notice:  This list has 2 medication(s) that are the same as other medications prescribed for you. Read the directions carefully, and ask your doctor or other care provider to review them with you.         Where to get your medicines      These medications were sent to [address] [address]     Phone:  [phone]     gabapentin 100 MG capsule                Primary Care Provider Office Phone # Fax #    Keith Lemons [phone]       [address] [address]        Thank you!     Thank you for choosing [facility]  for your care. Our goal is always to provide you with excellent care. Hearing back from our patients is one way we can continue to improve our services. Please take a few minutes to complete the written survey that you may receive in the mail after your visit with us. Thank you!             Your Updated Medication List - Protect others around you: Learn how to safely use, store and throw away your medicines at www.disposemymeds.org.          This list is accurate as of: 5/26/17 10:37 AM.  Always use your  most recent med list.                   Brand Name Dispense Instructions for use    alum & mag hydroxide-simethicone 200-200-20 MG/5ML Susp suspension    MYLANTA/MAALOX     Take 30 mLs by mouth every 4 hours as needed for indigestion       amLODIPine 5 MG tablet    NORVASC    30 tablet    Take 1 tablet (5 mg) by mouth daily       * buPROPion 150 MG 12 hr tablet    WELLBUTRIN SR    30 tablet    Take one (1) tablet BY MOUTH daily       * buPROPion 200 MG 12 hr tablet    WELLBUTRIN SR    30 tablet    take one in the a.m.       cholecalciferol 5000 UNITS Caps     30 capsule    Take 1 capsule (5,000 Units) by mouth daily       * CLONIDINE HCL PO      Take 0.1 mg by mouth 2 times daily       * cloNIDine 0.1 MG tablet    CATAPRES    30 tablet    Take one (1) tablet BY MOUTH every four (4) HOURS as needed patient takes twice daily according to [facility] med records       donepezil 10 MG tablet    ARICEPT    30 tablet    Take 1 tablet (10 mg) by mouth At Bedtime       * DULoxetine 60 MG EC capsule    CYMBALTA    30 capsule    Take 1 capsule (60 mg) by mouth daily       * DULoxetine 30 MG EC capsule    CYMBALTA    30 capsule    Take one (1) capsule BY MOUTH daily with 60mg       * GABAPENTIN PO      Take 50 mg by mouth as needed Also takes every 2hrs prn for anxeity 50ml       * gabapentin 100 MG capsule    NEURONTIN    90 capsule    Take 1 capsule (100 mg) by mouth 3 times daily       levothyroxine 75 MCG tablet    SYNTHROID/LEVOTHROID    30 tablet    Take 1 tablet (75 mcg) by mouth daily       memantine 10 MG tablet    NAMENDA    30 tablet    Take 1 tablet (10 mg) by mouth daily       multivitamin, therapeutic with minerals Tabs tablet     30 each    Take 1 tablet by mouth daily       PRILOSEC PO      Take 20 mg by mouth every morning       Sennosides 25 MG Tabs    SENNA MAXIMUM STRENGTH    30 tablet    Take 1 tablet by mouth daily       silver sulfADIAZINE 1 % cream    SILVADENE     Apply topically 2 times daily        tolterodine 4 MG 24 hr capsule    DETROL LA    30 capsule    Take 1 capsule (4 mg) by mouth daily       traZODone 100 MG tablet    DESYREL    30 tablet    Take 1 tablet (100 mg) by mouth At Bedtime       * Notice:  This list has 8 medication(s) that are the same as other medications prescribed for you. Read the directions carefully, and ask your doctor or other care provider to review them with you.

## 2017-05-26 NOTE — NURSING NOTE
Chief Complaint   Patient presents with     RECHECK     Mental Health       Initial /76 (Cuff Size: Adult Regular)  Pulse 68  Temp 97.6  F (36.4  C) (Tympanic)  Ht 5' (1.524 m)  Wt 98 lb (44.5 kg)  BMI 19.14 kg/m2 Estimated body mass index is 19.14 kg/(m^2) as calculated from the following:    Height as of this encounter: 5' (1.524 m).    Weight as of this encounter: 98 lb (44.5 kg).  Medication Reconciliation: fox Knutson

## 2017-05-26 NOTE — PATIENT INSTRUCTIONS
1. Anxiety    - gabapentin (NEURONTIN) 100 MG capsule; Take 1 capsule (100 mg) by mouth 3 times daily  Dispense: 90 capsule; Refill: 3    2. Hypothyroidism, unspecified type    - TSH with free T4 reflex    3. Bipolar 1 disorder (H)      Continue on your current medications.      Make sure you eat or have a shake in between meals.      Go to all activities.      Remember positive thinking/no stinkin thinkin.           Eating Healthy: Good Nutrition Quiz  To test your nutrition knowledge, answer the questions below as either True or False.               True False      1. There are many non-dairy sources of calcium.     2. Low-fat foods are always better.     3. Fiber isn t important.     4. You need to watch portion sizes only when eating at home.     5. The outside aisles of the grocery store are the best places to shop.       Answers  1. TRUE. While dairy products have the most calcium, you can also get this mineral from other foods, too. Try calcium-fortified juices, cereals, breads, rice milk, or almond milk. Other sources of calcium are canned fish, some beans, and dark, leafy greens. Soybeans and some soy items are also good options. These include soy yogurt, tempeh, and tofu made with calcium sulfate.  2. FALSE. Just because a food is low-fat or fat-free does not mean it s always a better nutritional choice. For instance, fat-free cookies have the same amount of sugar and calories, or often even more, than regular cookies. Read labels.  3. FALSE. High-fiber foods help keep your digestive system healthy. Try to get 25 to 38 grams of fiber a day. Also, remember to drink plenty of water to prevent constipation.  4. FALSE. Portion control is just as important when you re eating out. Many restaurants serve extra-large portions. So split your entree with someone at the table. Or ask for a take-home box. Then put half of your entree in it right away, before you start eating.  5. TRUE. This is where the fresh foods  tend to be. These include fruits, vegetables, grains, and dairy. Processed foods are more likely to be on the inside aisles. When shopping these aisles, read labels extra carefully.    8806-3023 [employer]. [address] [place] [address], [place] [address]. All rights reserved. This information is not intended as a substitute for professional medical care. Always follow your healthcare professional's instructions.

## 2017-05-26 NOTE — PROGRESS NOTES
"[facility]   Psychiatric Progress Note    Subjective   This is a 76 year old female with Bipolar 1 depression, anxiety and hypothyroidism. Tamera has been chronically mentally ill the majority of her life spending a fair amount of time in an intuition. Tamera has been working with Fouzia which she reports has been very helpful, she is getting out, attending groups. She is now working with an [employer] worker since I had last seen her. Tamera is happy with this individual and it sounds as if she gets her out. Tamera requests that I change her gabapentin to a capsule form rather than the solution which she is currently receiving. She cites the taste as the #1 reason to change, she would also like a \"little bit more\" of a dose to control her anxiety. Pts weight is unchanged she is hanging at 98lbs, discussed high protein shakes for inbetween meals. Part of David weight problem is her painful lower teeth so the shakes would work best. We also talked about constipation, I encouraged prune juice BID and limited this discussion due to her fixation on her bowel and pellagra .  Pt PHQ-9 has decreased from 22 in 3/2017 to 11 in 5/2017         DIagnoses:   Bipolar 1 disorder, currently depressed  hypothyroidism  Attestation:  Patient has been seen and evaluated by me,  IAN Robles CNP          Interim History:   The patient's care was discussed with the treatment team and chart notes were reviewed.          Medications:      Current Outpatient Prescriptions:      cloNIDine (CATAPRES) 0.1 MG tablet, Take one (1) tablet BY MOUTH every four (4) HOURS as needed patient takes twice daily according to [place] med records, Disp: 30 tablet, Rfl: 2     buPROPion (WELLBUTRIN SR) 200 MG 12 hr tablet, take one in the a.m., Disp: 30 tablet, Rfl: 4     DULoxetine (CYMBALTA) 30 MG EC capsule, Take one (1) capsule BY MOUTH daily with 60mg, Disp: 30 capsule, Rfl: 4     buPROPion (WELLBUTRIN SR) 150 MG 12 hr tablet, Take one (1) " tablet BY MOUTH daily, Disp: 30 tablet, Rfl: 0     CLONIDINE HCL PO, Take 0.1 mg by mouth 2 times daily, Disp: , Rfl:      levothyroxine (SYNTHROID/LEVOTHROID) 75 MCG tablet, Take 1 tablet (75 mcg) by mouth daily, Disp: 30 tablet, Rfl: 0     traZODone (DESYREL) 100 MG tablet, Take 1 tablet (100 mg) by mouth At Bedtime, Disp: 30 tablet, Rfl: 3     gabapentin (NEURONTIN) 400 MG capsule, Pt is to take 60 ml 4 x daily at 8 am, 12 pm, 4 pm, 9 pm, Disp: 30 capsule, Rfl: 3     tolterodine (DETROL LA) 4 MG 24 hr capsule, Take 1 capsule (4 mg) by mouth daily, Disp: 30 capsule, Rfl: 1     donepezil (ARICEPT) 10 MG tablet, Take 1 tablet (10 mg) by mouth At Bedtime, Disp: 30 tablet, Rfl: 5     silver sulfADIAZINE (SILVADENE) 1 % cream, Apply topically 2 times daily, Disp: , Rfl:      Omeprazole (PRILOSEC PO), Take 20 mg by mouth every morning, Disp: , Rfl:      GABAPENTIN PO, Take 50 mg by mouth as needed Also takes every 2hrs prn for anxeity 50ml, Disp: , Rfl:      DULoxetine (CYMBALTA) 60 MG EC capsule, Take 1 capsule (60 mg) by mouth daily, Disp: 30 capsule, Rfl: 1     amLODIPine (NORVASC) 5 MG tablet, Take 1 tablet (5 mg) by mouth daily, Disp: 30 tablet, Rfl: 3     Sennosides (SENNA MAXIMUM STRENGTH) 25 MG TABS, Take 1 tablet by mouth daily, Disp: 30 tablet, Rfl: 0     memantine (NAMENDA) 10 MG tablet, Take 1 tablet (10 mg) by mouth daily, Disp: 30 tablet, Rfl: 0     multivitamin, therapeutic with minerals (THERA-VIT-M) TABS, Take 1 tablet by mouth daily, Disp: 30 each, Rfl: 0     cholecalciferol 5000 UNITS CAPS, Take 1 capsule (5,000 Units) by mouth daily, Disp: 30 capsule, Rfl: 0     alum & mag hydroxide-simethicone (MYLANTA/MAALOX) 200-200-20 MG/5ML SUSP, Take 30 mLs by mouth every 4 hours as needed for indigestion, Disp: , Rfl:             Allergies:     Allergies   Allergen Reactions     Benadryl [Diphenhydramine] Unknown     Patient states she is allergic to benadryl     Depakote      Escitalopram      Suicidal  "ideation     Famotidine      Fluoxetine      Suicidal ideation     Hydrocodone      Lithium Swelling     Other [Seasonal Allergies]      Pet hair       Penicillins Nausea     Risperdal [Risperidone] Unknown     Patient states she is allergic to risperdal       Valproic Acid Unknown     \"seizures\"       10 point ROS reviewed- negative findings       Psychiatric Examination:   /76 (Cuff Size: Adult Regular)  Pulse 68  Temp 97.6  F (36.4  C) (Tympanic)  Ht 1.524 m (5')  Wt 44.5 kg (98 lb)  BMI 19.14 kg/m2  Weight is 98 lbs 0 oz  Body mass index is 19.14 kg/(m^2).    Appearance:  awake, alert and well groomed  Attitude:  cooperative  Eye Contact:  good  Mood:  better  Affect:  mood congruent and intensity is blunted  Speech:  clear, coherent  Psychomotor Behavior:  no evidence of tardive dyskinesia, dystonia, or tics  Thought Process:  logical and linear  Associations:  no loose associations  Thought Content:  no evidence of suicidal ideation or homicidal ideation  Insight:  fair  Judgment:  fair  Oriented to:  time, person, and place  Attention Span and Concentration:  fair  Recent and Remote Memory:  fair  Fund of Knowledge: appropriate  Muscle Strength and Tone: deconditioned  Gait and Station: Normal  Perception : No perceptual disorder noted         Labs:     TSH pending        Assessment/ Plan:    Change gabapentin 50 mg/ml to gabapentin 100 mg capsule three times a day.  Check TSH  Discussed strategies for positive thinking  Encouraged ongoing activities    "

## 2017-05-27 ASSESSMENT — PATIENT HEALTH QUESTIONNAIRE - PHQ9: SUM OF ALL RESPONSES TO PHQ QUESTIONS 1-9: 11

## 2017-05-27 ASSESSMENT — ANXIETY QUESTIONNAIRES: GAD7 TOTAL SCORE: 4

## 2017-05-28 NOTE — PROGRESS NOTES
Progress Note    Client Name: Tamera Kumar  Date: May 11, 2017         Service Type: Individual      Session Start Time: 3:00  Session End Time: 3:56      Session Length: 56 minutes     Session #: 4     Attendees: Client attended alone    DSM-V Diagnoses:   296.53 Bipolar I Disorder Current or Most Recent Episode Depressed, Severe    DA Date: 1/23/17    Treatment Plan Due: 5/13/17  PHQ-9 :   PHQ-9 SCORE 4/28/2017 5/11/2017 5/26/2017   Total Score 15 7 11      BRIGID-7 :    BRIGID-7 SCORE 4/28/2017 5/11/2017 5/26/2017   Total Score 10 11 4           DATA      Progress Since Last Session (Related to Symptoms / Goals / Homework):   Symptoms: somewhat improved    Homework: Completed in session     Current / Ongoing Stressors and Concerns:  Tamera reported that she continues to get out more for social activities. She also stated that her mood has been improving. Tamera continues to be fidgety and restless. She explained that she has been experiencing increased feelings of self doubt and other negative thinking habits.     Treatment Objective(s) Addressed in This Session:   Objective 1A (Client Action)                  Client will learn IMR and CBT skills to use for reducing depressed mood.     Intervention:   Provided psycho-education utilizing CBT, NATs and cognitive restructuring.      Response to Interventions:   Tamera stated she understood this information and that she would use it at home.      ASSESSMENT: Current Emotional / Mental Status (status of significant symptoms):   Risk status (Self / Other harm or suicidal ideation)   Client denies current fears or concerns for personal safety.   Client denies current or recent suicidal ideation.   Client denies current or recent homicidal ideation or behaviors.   Client denies current or recent self injurious behavior or ideation.   Client denies other safety concerns.   A safety and risk management plan has not been developed at  this time, however client was given the after-hours number / 911 should there be a change in any of these risk factors.     Appearance:   Appropriate    Eye Contact:   Fair    Psychomotor Behavior: Restless    Attitude:   Cooperative    Orientation:   All   Speech    Rate / Production: Normal     Volume:  Normal    Mood:    Anxious  Depressed    Affect:    Worrisome    Thought Content:  Rumination    Thought Form:  Coherent  Circumstantial   Insight:    Poor         Medication Compliance:   Yes     Changes in Health Issues:   None reported     Chemical Use Review:   Substance Use: Chemical use reviewed, no active concerns identified      Tobacco Use: No current tobacco use.       Collateral Reports Completed:   Not Applicable    PLAN: (Client Tasks / Therapist Tasks / Other)  Tamera was asked to practice the skills at home and to return for follow-up appointments.    Fouzia Mason, CAROLINESW

## 2017-05-29 ASSESSMENT — ANXIETY QUESTIONNAIRES: GAD7 TOTAL SCORE: 11

## 2017-05-29 ASSESSMENT — PATIENT HEALTH QUESTIONNAIRE - PHQ9: SUM OF ALL RESPONSES TO PHQ QUESTIONS 1-9: 7

## 2017-06-01 NOTE — PROGRESS NOTES
Progress Note    Client Name: Tamera Kumar  Date: May 25, 2017         Service Type: Individual      Session Start Time: 1:20  Session End Time: 2:18      Session Length: 58 minutes     Session #: 5     Attendees: Client attended alone    DSM-V Diagnoses:   296.53 Bipolar I Disorder Current or Most Recent Episode Depressed, Severe    DA Date: 1/23/17    Treatment Plan Due: 8/25/17  PHQ-9 :   PHQ-9 SCORE 5/11/2017 5/25/2017 5/26/2017   Total Score 7 8 11      BRIGID-7 :    BRIGID-7 SCORE 5/11/2017 5/25/2017 5/26/2017   Total Score 11 4 4           DATA      Progress Since Last Session (Related to Symptoms / Goals / Homework):   Symptoms: somewhat improved    Homework: Completed in session     Current / Ongoing Stressors and Concerns:  Tamera explained that she thinks her symptoms of depression are increasing. She reported that she has been engaging in more social activities and has been working with an [employer] worker. Tamera reports she thinks her depression is worse because she does not want to participate in activities but she does anyway. Tamera also discussed self image issues and maladaptive thinking patterns.     Treatment Objective(s) Addressed in This Session:   Objective 1A (Client Action)                  Client will learn IMR and CBT skills to use for reducing depressed mood.     Intervention:   Provided psycho-education utilizing CBT, NATs and cognitive restructuring. We also worked on distraction exercises.     Response to Interventions:   Tamera stated she understood this information and that she would use it at home.      ASSESSMENT: Current Emotional / Mental Status (status of significant symptoms):   Risk status (Self / Other harm or suicidal ideation)   Client denies current fears or concerns for personal safety.   Client denies current or recent suicidal ideation.   Client denies current or recent homicidal ideation or behaviors.   Client denies current or  recent self injurious behavior or ideation.   Client denies other safety concerns.   A safety and risk management plan has not been developed at this time, however client was given the after-hours number / 911 should there be a change in any of these risk factors.     Appearance:   Appropriate    Eye Contact:   Fair    Psychomotor Behavior: Restless    Attitude:   Cooperative    Orientation:   All   Speech    Rate / Production: Normal     Volume:  Normal    Mood:    Anxious  Depressed    Affect:    Worrisome    Thought Content:  Rumination    Thought Form:  Coherent  Circumstantial   Insight:    Poor         Medication Compliance:   Yes     Changes in Health Issues:   None reported     Chemical Use Review:   Substance Use: Chemical use reviewed, no active concerns identified      Tobacco Use: No current tobacco use.       Collateral Reports Completed:   Not Applicable    PLAN: (Client Tasks / Therapist Tasks / Other)  Tamera was asked to practice the skills at home and to return for follow-up appointments.    CAROLINE SandersSW

## 2017-06-01 NOTE — PROGRESS NOTES
Treatment Plan    Client's Name: Tamera Kumar  YOB: 1940    Date: May 25, 2017    DSM-V Diagnoses:   296.53 Bipolar I Disorder Current or Most Recent Episode Depressed, Severe    DA Date: 1/23/17  Psychosocial / Contextual Factors:   biological, social, interpersonal    Referral / Collaboration:  Referral to another professional/service is not indicated at this time.    Services to be provided/Interventions:   Interventions will be to alleviate anxiety, alleviate depressed mood, increase ability to function adaptively, increase coping skills, increase self esteem, process traumas, teach CBT skills, teach mindfulness skills and teach relaxation strategies.    Functional Impairment:   Activities of Daily Living, Social / Relational.    Anticipated number of session: 6      MeasurableTreatment Goal(s) related to diagnosis / functional impairment(s)  Goal 1: Client will report a reduction in depressed mood 5 sessions out of 6.     Objective A (Client Action)                  Client will learn IMR and CBT skills to use for reducing depressed mood.     Objective B  Client will report using IMR or CBT skills 5 days out of 7.     Objective C  Client will learn and use mindfulness skills 5 days out of 7.     Goal 2: Client will report a decrease in anxiety 5 out of 6 sessions.     Objective A (Client Action)                  Client will learn 5 relaxation skills.     Objective B  Client will report using relaxations skills 5 days out of 7.        CAROLINE SandersSW

## 2017-06-02 ASSESSMENT — PATIENT HEALTH QUESTIONNAIRE - PHQ9: SUM OF ALL RESPONSES TO PHQ QUESTIONS 1-9: 8

## 2017-06-02 ASSESSMENT — ANXIETY QUESTIONNAIRES: GAD7 TOTAL SCORE: 4

## 2017-06-05 DIAGNOSIS — F33.1 MODERATE EPISODE OF RECURRENT MAJOR DEPRESSIVE DISORDER (H): ICD-10-CM

## 2017-06-07 RX ORDER — BUPROPION HYDROCHLORIDE 200 MG/1
TABLET, EXTENDED RELEASE ORAL
Qty: 30 TABLET | Refills: 3 | Status: SHIPPED | OUTPATIENT
Start: 2017-06-07 | End: 2017-07-21

## 2017-06-15 ENCOUNTER — OFFICE VISIT (OUTPATIENT)
Dept: PSYCHOLOGY | Facility: OTHER | Age: 77
End: 2017-06-15
Attending: SOCIAL WORKER
Payer: COMMERCIAL

## 2017-06-15 DIAGNOSIS — F31.30 BIPOLAR I DISORDER, MOST RECENT EPISODE DEPRESSED (H): Primary | ICD-10-CM

## 2017-06-15 PROCEDURE — 90837 PSYTX W PT 60 MINUTES: CPT | Performed by: SOCIAL WORKER

## 2017-06-15 ASSESSMENT — ANXIETY QUESTIONNAIRES
7. FEELING AFRAID AS IF SOMETHING AWFUL MIGHT HAPPEN: NOT AT ALL
5. BEING SO RESTLESS THAT IT IS HARD TO SIT STILL: NOT AT ALL
1. FEELING NERVOUS, ANXIOUS, OR ON EDGE: SEVERAL DAYS
6. BECOMING EASILY ANNOYED OR IRRITABLE: SEVERAL DAYS
GAD7 TOTAL SCORE: 4
2. NOT BEING ABLE TO STOP OR CONTROL WORRYING: SEVERAL DAYS
3. WORRYING TOO MUCH ABOUT DIFFERENT THINGS: SEVERAL DAYS
IF YOU CHECKED OFF ANY PROBLEMS ON THIS QUESTIONNAIRE, HOW DIFFICULT HAVE THESE PROBLEMS MADE IT FOR YOU TO DO YOUR WORK, TAKE CARE OF THINGS AT HOME, OR GET ALONG WITH OTHER PEOPLE: SOMEWHAT DIFFICULT

## 2017-06-15 ASSESSMENT — PATIENT HEALTH QUESTIONNAIRE - PHQ9: 5. POOR APPETITE OR OVEREATING: NOT AT ALL

## 2017-06-15 NOTE — MR AVS SNAPSHOT
After Visit Summary   6/15/2017    Tamera Kumar    MRN: [id number]           Patient Information     Date Of Birth          1940        Visit Information        Provider [facility]      [facility]        Today's Diagnoses     Bipolar I disorder, most recent episode depressed (H)    -  1       Follow-ups after your visit        Your next 10 appointments already scheduled     Jun 29, 2017 11:30 AM CDT   (Arrive by 11:15 AM)   Return Visit with Fouzia Pappase Isabella [facility] ([facility] )    [address] [place] [id number]   [phone]            Jun 30, 2017 10:00 AM CDT   (Arrive by 9:45 AM)   Return Visit with IAN Redd [facility] ([facility] )    [address] [place] [address]   [phone]            Jul 20, 2017  2:00 PM CDT   (Arrive by 1:45 PM)   [facility]    [address] [place] [address]   [phone]              Who to contact     If you have questions or need follow up information about today's clinic visit or your schedule please contact [facility] directly at [phone].  Normal or non-critical lab and imaging results will be communicated to you by MyChart, letter or phone within 4 business days after the clinic has received the results. If you do not hear from us within 7 days, please contact the clinic through [employer] or phone. If you have a critical or abnormal lab result, we will notify you by phone as soon as possible.  Submit refill requests through [employer] or call your pharmacy and they will forward the refill request to us. Please allow 3 business days for your refill to be completed.          Additional Information About Your Visit        MyChart Information     [facility] lets  "you send messages to your doctor, view your test results, renew your prescriptions, schedule appointments and more. To sign up, go to [email or web address] . Click on \"Log in\" on the left side of the screen, which will take you to the Welcome page. Then click on \"Sign up Now\" on the right side of the page.     You will be asked to enter the access code listed below, as well as some personal information. Please follow the directions to create your username and password.     Your access code is: [email or web address]  Expires: 2017  9:34 AM     Your access code will  in 90 days. If you need help or a new code, please call your [facility] clinic or [phone].        Care EveryWhere ID     This is your Care EveryWhere ID. This could be used by other organizations to access your [facility] medical records  [id number]         Blood Pressure from Last 3 Encounters:   17 138/76   17 138/88   03/10/17 110/60    Weight from Last 3 Encounters:   17 98 lb (44.5 kg)   17 97 lb 12.8 oz (44.4 kg)   03/10/17 98 lb (44.5 kg)              Today, you had the following     No orders found for display       Primary Care Provider Office Phone # Fax #    Keith Lemons [phone]       [facility] [address] [facility] [address]        Equal Access to Services     [facility] HospitalBE[facility] AH: Hadii michael ku hadasho Soomaali, waaxda luqadaha, qaybta kaalmada cedricegyamilind, melania crook . So [facility] [phone].    ATENCIÓN: Si nadinela natty, tiene a pittman disposición servicios gratuitos de asistencia lingüística. Hardy al [phone].    We comply with applicable federal civil rights laws and [place] laws. We do not discriminate on the basis of race, color, national origin, age, disability sex, sexual orientation or gender identity.            Thank you!     Thank you for choosing [facility]  for your care. Our goal is always to provide you with excellent care. Hearing " back from our patients is one way we can continue to improve our services. Please take a few minutes to complete the written survey that you may receive in the mail after your visit with us. Thank you!             Your Updated Medication List - Protect others around you: Learn how to safely use, store and throw away your medicines at www.disposemymeds.org.          This list is accurate as of: 6/15/17 11:59 PM.  Always use your most recent med list.                   Brand Name Dispense Instructions for use Diagnosis    alum & mag hydroxide-simethicone 200-200-20 MG/5ML Susp suspension    MYLANTA/MAALOX     Take 30 mLs by mouth every 4 hours as needed for indigestion        amLODIPine 5 MG tablet    NORVASC    30 tablet    Take 1 tablet (5 mg) by mouth daily    Essential hypertension       * buPROPion 150 MG 12 hr tablet    WELLBUTRIN SR    30 tablet    Take one (1) tablet BY MOUTH daily    Depression, unspecified depression type       * buPROPion 200 MG 12 hr tablet    WELLBUTRIN SR    30 tablet    take one in the a.m.    Moderate episode of recurrent major depressive disorder (H)       cholecalciferol 5000 UNITS Caps     30 capsule    Take 1 capsule (5,000 Units) by mouth daily    Bipolar 1 disorder (H)       CLONIDINE HCL PO      Take 0.1 mg by mouth 2 times daily        * DULoxetine 60 MG EC capsule    CYMBALTA    30 capsule    Take 1 capsule (60 mg) by mouth daily    Recurrent major depressive disorder, in partial remission (H)       * DULoxetine 30 MG EC capsule    CYMBALTA    30 capsule    Take one (1) capsule BY MOUTH daily with 60mg    Recurrent major depressive disorder, in partial remission (H)       * GABAPENTIN PO      Take 50 mg by mouth as needed Also takes every 2hrs prn for anxeity 50ml        * gabapentin 100 MG capsule    NEURONTIN    90 capsule    Take 1 capsule (100 mg) by mouth 3 times daily    Anxiety       levothyroxine 75 MCG tablet    SYNTHROID/LEVOTHROID    30 tablet    Take 1 tablet (75  mcg) by mouth daily    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 substances       memantine 10 MG tablet    NAMENDA    30 tablet    Take 1 tablet (10 mg) by mouth daily    Vascular dementia with behavior disturbance       multivitamin, therapeutic with minerals Tabs tablet     30 each    Take 1 tablet by mouth daily    Bipolar 1 disorder (H)       PRILOSEC PO      Take 20 mg by mouth every morning        Sennosides 25 MG Tabs    SENNA MAXIMUM STRENGTH    30 tablet    Take 1 tablet by mouth daily    Slow transit constipation       silver sulfADIAZINE 1 % cream    SILVADENE     Apply topically 2 times daily        tolterodine 4 MG 24 hr capsule    DETROL LA    30 capsule    Take 1 capsule (4 mg) by mouth daily    Urge incontinence of urine       traZODone 100 MG tablet    DESYREL    30 tablet    Take 1 tablet (100 mg) by mouth At Bedtime    Recurrent major depressive disorder, in partial remission (H)       * Notice:  This list has 6 medication(s) that are the same as other medications prescribed for you. Read the directions carefully, and ask your doctor or other care provider to review them with you.

## 2017-06-19 DIAGNOSIS — F01.518 VASCULAR DEMENTIA WITH BEHAVIOR DISTURBANCE (H): ICD-10-CM

## 2017-06-19 DIAGNOSIS — I10 ESSENTIAL HYPERTENSION: ICD-10-CM

## 2017-06-22 RX ORDER — CLONIDINE HYDROCHLORIDE 0.1 MG/1
TABLET ORAL
Qty: 30 TABLET | Refills: 0 | Status: SHIPPED | OUTPATIENT
Start: 2017-06-22 | End: 2017-08-11

## 2017-06-22 RX ORDER — DONEPEZIL HYDROCHLORIDE 10 MG/1
TABLET, FILM COATED ORAL
Qty: 30 TABLET | Refills: 5 | Status: SHIPPED | OUTPATIENT
Start: 2017-06-22 | End: 2017-11-13

## 2017-06-22 NOTE — TELEPHONE ENCOUNTER
Last psychiatric office visit 05/26/17 with Naomi Cook.  Catapres last filled 05/16/17 #30 with 2 refills.  Associated dx is hypertension. Request to Naomi Cook. Please review and sign if appropriate.

## 2017-06-27 NOTE — PROGRESS NOTES
Progress Note    Client Name: Tamera Kumar  Date: Alaina 15, 2017         Service Type: Individual      Session Start Time: 11:15  Session End Time: 12:10      Session Length: 55 minutes     Session #: 6     Attendees: Client attended alone    DSM-V Diagnoses:   296.53 Bipolar I Disorder Current or Most Recent Episode Depressed, Severe    DA Date: 1/23/17    Treatment Plan Due: 8/25/17  PHQ-9 :   PHQ-9 SCORE 5/25/2017 5/26/2017 6/15/2017   Total Score 8 11 11      BRIGID-7 :    BRIGID-7 SCORE 5/25/2017 5/26/2017 6/15/2017   Total Score 4 4 4           DATA      Progress Since Last Session (Related to Symptoms / Goals / Homework):   Symptoms: same    Homework: Completed in session     Current / Ongoing Stressors and Concerns:  Tamera reports that she has been engaging in more social activities. Such as; shopping, going for tea, and going to Religion. However, Tamera reports that she has not been enjoying anything and that she has been having trouble concentrating. Tamera continues to discuss feeling as though she is not a good person because she struggles with depressive thoughts.     Treatment Objective(s) Addressed in This Session:   Objective 1A (Client Action)                  Client will learn IMR and CBT skills to use for reducing depressed mood.     Intervention:   Provided psycho-education utilizing CBT, NATs and cognitive restructuring. Also worked on how maladaptive thoughts can alter judgement.      Response to Interventions:   Tamera stated she understood this information and that she would use it at home.      ASSESSMENT: Current Emotional / Mental Status (status of significant symptoms):   Risk status (Self / Other harm or suicidal ideation)   Client denies current fears or concerns for personal safety.   Client denies current or recent suicidal ideation.   Client denies current or recent homicidal ideation or behaviors.   Client denies current or recent self  injurious behavior or ideation.   Client denies other safety concerns.   A safety and risk management plan has not been developed at this time, however client was given the after-hours number / 911 should there be a change in any of these risk factors.     Appearance:   Appropriate    Eye Contact:   Fair    Psychomotor Behavior: Restless    Attitude:   Cooperative    Orientation:   All   Speech    Rate / Production: Normal     Volume:  Normal    Mood:    Anxious  Depressed    Affect:    Worrisome    Thought Content:  Rumination    Thought Form:  Coherent  Circumstantial   Insight:    Poor         Medication Compliance:   Yes     Changes in Health Issues:   None reported     Chemical Use Review:   Substance Use: Chemical use reviewed, no active concerns identified      Tobacco Use: No current tobacco use.       Collateral Reports Completed:   Not Applicable    PLAN: (Client Tasks / Therapist Tasks / Other)  Tamera was asked to practice the skills at home and to return for follow-up appointments.    Fouzia Mason, CAROLINESW

## 2017-06-28 ASSESSMENT — PATIENT HEALTH QUESTIONNAIRE - PHQ9: SUM OF ALL RESPONSES TO PHQ QUESTIONS 1-9: 11

## 2017-06-28 ASSESSMENT — ANXIETY QUESTIONNAIRES: GAD7 TOTAL SCORE: 4

## 2017-07-20 ENCOUNTER — OFFICE VISIT (OUTPATIENT)
Dept: PSYCHOLOGY | Facility: OTHER | Age: 77
End: 2017-07-20
Attending: SOCIAL WORKER
Payer: COMMERCIAL

## 2017-07-20 DIAGNOSIS — F31.30 BIPOLAR I DISORDER, MOST RECENT EPISODE DEPRESSED (H): Primary | ICD-10-CM

## 2017-07-20 PROCEDURE — 90837 PSYTX W PT 60 MINUTES: CPT | Performed by: SOCIAL WORKER

## 2017-07-20 ASSESSMENT — ANXIETY QUESTIONNAIRES
7. FEELING AFRAID AS IF SOMETHING AWFUL MIGHT HAPPEN: NOT AT ALL
3. WORRYING TOO MUCH ABOUT DIFFERENT THINGS: NEARLY EVERY DAY
1. FEELING NERVOUS, ANXIOUS, OR ON EDGE: NEARLY EVERY DAY
GAD7 TOTAL SCORE: 14
2. NOT BEING ABLE TO STOP OR CONTROL WORRYING: NEARLY EVERY DAY
IF YOU CHECKED OFF ANY PROBLEMS ON THIS QUESTIONNAIRE, HOW DIFFICULT HAVE THESE PROBLEMS MADE IT FOR YOU TO DO YOUR WORK, TAKE CARE OF THINGS AT HOME, OR GET ALONG WITH OTHER PEOPLE: VERY DIFFICULT
6. BECOMING EASILY ANNOYED OR IRRITABLE: MORE THAN HALF THE DAYS
5. BEING SO RESTLESS THAT IT IS HARD TO SIT STILL: SEVERAL DAYS

## 2017-07-20 ASSESSMENT — PATIENT HEALTH QUESTIONNAIRE - PHQ9: 5. POOR APPETITE OR OVEREATING: MORE THAN HALF THE DAYS

## 2017-07-20 NOTE — MR AVS SNAPSHOT
After Visit Summary   7/20/2017    Tamera Kumar    MRN: [id number]           Patient Information     Date Of Birth          1940        Visit Information        Provider Department      [facility]2017 [facility]        Today's Diagnoses     Bipolar I disorder, most recent episode depressed (H)    -  1       Follow-ups after your visit        Your next 10 appointments already scheduled     Aug 03, 2017 12:00 PM CDT   (Arrive by 11:45 AM)   Return Visit with Fouzia Mason, [facility] ([facility] )    [address] [place] [address]   [phone]            Aug 11, 2017  2:45 PM CDT   (Arrive by 2:30 PM)   Return Visit with IAN Redd [facility] ([facility] )    [address] [place] [address]   [phone]            Aug 17, 2017  9:00 AM CDT   (Arrive by 8:45 AM)   [facility]    [address] [place] [address]   [phone]              Who to contact     If you have questions or need follow up information about today's clinic visit or your schedule please contact [facility] directly at [phone].  Normal or non-critical lab and imaging results will be communicated to you by MyChart, letter or phone within 4 business days after the clinic has received the results. If you do not hear from us within 7 days, please contact the clinic through [employer] or phone. If you have a critical or abnormal lab result, we will notify you by phone as soon as possible.  Submit refill requests through HSTYLE or call your pharmacy and they will forward the refill request to us. Please allow 3 business days for your refill to be completed.          Additional Information About Your Visit        MyChart Information     [facility] lets you  "send messages to your doctor, view your test results, renew your prescriptions, schedule appointments and more. To sign up, go to [email or web address] . Click on \"Log in\" on the left side of the screen, which will take you to the Welcome page. Then click on \"Sign up Now\" on the right side of the page.     You will be asked to enter the access code listed below, as well as some personal information. Please follow the directions to create your username and password.     Your access code is: [email or web address]  Expires: 2017  9:34 AM     Your access code will  in 90 days. If you need help or a new code, please call your [facility] clinic or [phone].        Care EveryWhere ID     This is your Care EveryWhere ID. This could be used by other organizations to access your [facility] medical records  [id number]         Blood Pressure from Last 3 Encounters:   17 136/68   17 138/76   17 138/88    Weight from Last 3 Encounters:   17 102 lb (46.3 kg)   17 98 lb (44.5 kg)   17 97 lb 12.8 oz (44.4 kg)              Today, you had the following     No orders found for display       Primary Care Provider Office Phone # Fax #    Keith Lemons [phone]       [facility] [address] [facility] [address]        Equal Access to Services     [facility]: Hadii michael kelly hadasho Soomaali, waaxda luqadaha, qaybta kaalmada cedricegyamilind, melania crook . So [facility] [phone].    ATENCIÓN: Si habla natty, tiene a pittman disposición servicios gratuitos de asistencia lingüística. Llame al [phone].    We comply with applicable federal civil rights laws and [place] laws. We do not discriminate on the basis of race, color, national origin, age, disability sex, sexual orientation or gender identity.            Thank you!     Thank you for choosing [facility]  for your care. Our goal is always to provide you with excellent care. Hearing " back from our patients is one way we can continue to improve our services. Please take a few minutes to complete the written survey that you may receive in the mail after your visit with us. Thank you!             Your Updated Medication List - Protect others around you: Learn how to safely use, store and throw away your medicines at www.disposemymeds.org.          This list is accurate as of: 7/20/17 11:59 PM.  Always use your most recent med list.                   Brand Name Dispense Instructions for use Diagnosis    alum & mag hydroxide-simethicone 200-200-20 MG/5ML Susp suspension    MYLANTA/MAALOX     Take 30 mLs by mouth every 4 hours as needed for indigestion        amLODIPine 5 MG tablet    NORVASC    30 tablet    Take 1 tablet (5 mg) by mouth daily    Essential hypertension       cholecalciferol 5000 UNITS Caps     30 capsule    Take 1 capsule (5,000 Units) by mouth daily    Bipolar 1 disorder (H)       * CLONIDINE HCL PO      Take 0.1 mg by mouth 2 times daily        * cloNIDine 0.1 MG tablet    CATAPRES    30 tablet    Take one (1) tablet BY MOUTH every four (4) HOURS as needed patient takes twice daily according to [place] med records    Essential hypertension       donepezil 10 MG tablet    ARICEPT    30 tablet    Take 1 tablet (10 mg) by mouth At Bedtime    Vascular dementia with behavior disturbance       * DULoxetine 60 MG EC capsule    CYMBALTA    30 capsule    Take 1 capsule (60 mg) by mouth daily    Recurrent major depressive disorder, in partial remission (H)       * DULoxetine 30 MG EC capsule    CYMBALTA    30 capsule    Take one (1) capsule BY MOUTH daily with 60mg    Recurrent major depressive disorder, in partial remission (H)       * GABAPENTIN PO      Take 50 mg by mouth as needed Also takes every 2hrs prn for anxeity 50ml        * gabapentin 100 MG capsule    NEURONTIN    90 capsule    Take 1 capsule (100 mg) by mouth 3 times daily    Anxiety       levothyroxine 75 MCG tablet     SYNTHROID/LEVOTHROID    30 tablet    Take 1 tablet (75 mcg) by mouth daily    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 substances       memantine 10 MG tablet    NAMENDA    30 tablet    Take 1 tablet (10 mg) by mouth daily    Vascular dementia with behavior disturbance       multivitamin, therapeutic with minerals Tabs tablet     30 each    Take 1 tablet by mouth daily    Bipolar 1 disorder (H)       PRILOSEC PO      Take 20 mg by mouth every morning        Sennosides 25 MG Tabs    SENNA MAXIMUM STRENGTH    30 tablet    Take 1 tablet by mouth daily    Slow transit constipation       silver sulfADIAZINE 1 % cream    SILVADENE     Apply topically 2 times daily        tolterodine 4 MG 24 hr capsule    DETROL LA    30 capsule    Take 1 capsule (4 mg) by mouth daily    Urge incontinence of urine       traZODone 100 MG tablet    DESYREL    30 tablet    Take 1 tablet (100 mg) by mouth At Bedtime    Recurrent major depressive disorder, in partial remission (H)       * Notice:  This list has 6 medication(s) that are the same as other medications prescribed for you. Read the directions carefully, and ask your doctor or other care provider to review them with you.

## 2017-07-21 ENCOUNTER — OFFICE VISIT (OUTPATIENT)
Dept: PSYCHIATRY | Facility: OTHER | Age: 77
End: 2017-07-21
Attending: NURSE PRACTITIONER
Payer: COMMERCIAL

## 2017-07-21 VITALS
BODY MASS INDEX: 20.03 KG/M2 | HEIGHT: 60 IN | SYSTOLIC BLOOD PRESSURE: 136 MMHG | OXYGEN SATURATION: 97 % | RESPIRATION RATE: 18 BRPM | WEIGHT: 102 LBS | DIASTOLIC BLOOD PRESSURE: 68 MMHG | TEMPERATURE: 97.2 F | HEART RATE: 61 BPM

## 2017-07-21 DIAGNOSIS — F33.2 SEVERE EPISODE OF RECURRENT MAJOR DEPRESSIVE DISORDER, WITHOUT PSYCHOTIC FEATURES (H): Primary | ICD-10-CM

## 2017-07-21 PROCEDURE — 99214 OFFICE O/P EST MOD 30 MIN: CPT | Performed by: NURSE PRACTITIONER

## 2017-07-21 PROCEDURE — 99212 OFFICE O/P EST SF 10 MIN: CPT

## 2017-07-21 RX ORDER — BUPROPION HYDROCHLORIDE 300 MG/1
300 TABLET ORAL EVERY MORNING
Qty: 30 TABLET | Refills: 3 | Status: SHIPPED | OUTPATIENT
Start: 2017-07-21 | End: 2017-09-15

## 2017-07-21 ASSESSMENT — ANXIETY QUESTIONNAIRES
7. FEELING AFRAID AS IF SOMETHING AWFUL MIGHT HAPPEN: NEARLY EVERY DAY
5. BEING SO RESTLESS THAT IT IS HARD TO SIT STILL: MORE THAN HALF THE DAYS
6. BECOMING EASILY ANNOYED OR IRRITABLE: NEARLY EVERY DAY
2. NOT BEING ABLE TO STOP OR CONTROL WORRYING: NEARLY EVERY DAY
1. FEELING NERVOUS, ANXIOUS, OR ON EDGE: NEARLY EVERY DAY
IF YOU CHECKED OFF ANY PROBLEMS ON THIS QUESTIONNAIRE, HOW DIFFICULT HAVE THESE PROBLEMS MADE IT FOR YOU TO DO YOUR WORK, TAKE CARE OF THINGS AT HOME, OR GET ALONG WITH OTHER PEOPLE: VERY DIFFICULT
3. WORRYING TOO MUCH ABOUT DIFFERENT THINGS: NEARLY EVERY DAY
GAD7 TOTAL SCORE: 19

## 2017-07-21 ASSESSMENT — PAIN SCALES - GENERAL: PAINLEVEL: NO PAIN (0)

## 2017-07-21 ASSESSMENT — PATIENT HEALTH QUESTIONNAIRE - PHQ9: 5. POOR APPETITE OR OVEREATING: MORE THAN HALF THE DAYS

## 2017-07-21 NOTE — NURSING NOTE
Chief Complaint   Patient presents with     Mental Health Problem     Follow up depression,anxiety.       Initial /68 (BP Location: Right arm, Patient Position: Sitting, Cuff Size: Adult Regular)  Pulse 61  Temp 97.2  F (36.2  C) (Tympanic)  Resp 18  Ht 5' (1.524 m)  Wt 102 lb (46.3 kg)  SpO2 97%  BMI 19.92 kg/m2 Estimated body mass index is 19.92 kg/(m^2) as calculated from the following:    Height as of this encounter: 5' (1.524 m).    Weight as of this encounter: 102 lb (46.3 kg).  Medication Reconciliation: complete   Luzma Peraza LPN

## 2017-07-21 NOTE — MR AVS SNAPSHOT
After Visit Summary   7/21/2017    Tamera Kumar    MRN: [id number]           Patient Information     Date Of Birth          1940        Visit Information        Provider Department      7/21/2017 9:30 AM Naomi Cook APRN [facility]        Today's Diagnoses     Severe episode of recurrent major depressive disorder, without psychotic features (H)    -  1      Care Instructions    1. Severe episode of recurrent major depressive disorder, without psychotic features (H)    You will stop taking the Wellbutrin SR twice daily and start    - buPROPion (WELLBUTRIN XL) 300 MG 24 hr tablet; Take 1 tablet (300 mg) by mouth every morning  Dispense: 30 tablet; Refill: 3                        Depression Affects Your Mind and Body  Everyone feels sad or  blue  from time to time for a few days or weeks. Depression is when these feelings don't go away and they interfere with daily life.  Depression is a real illness that can develop at any age. It is one of the most common mental health problems in the [place] Depression makes you feel sad, helpless, and hopeless. It gets in the way of your life and relationships. It inhibits your ability to think and act. But, with help, you can feel better again.      When I was depressed, I felt awful. I was so tired all the time I could hardly think, but at night I couldn t fall asleep. My head hurt. My stomach hurt. I didn t know what was wrong with me.    Depression affects your whole body  Brain chemicals affect your body as well as your mood. So depression may do more than just make you feel low. You may also feel bad physically. Depression can:    Cause trouble with mental tasks such as remembering, concentrating, or making decisions    Make you feel nervous and jumpy    Cause trouble sleeping. Or you may sleep too much    Change your appetite    Cause headaches, stomachaches, or other aches and pains    Drain your body of energy  Depression and  other illness  It is common for people who have chronic health problems to also have depression. It can often be hard to tell which one caused the other. A person might become depressed after finding out they have a health problem. But some studies suggest being depressed may make certain health problems more likely. And some depressed people stop taking care of themselves. This may make them more likely to get sick.  Date Last Reviewed: 1/1/2017 2000-2017 [employer]. [address] [place] [address], [place] [address]. All rights reserved. This information is not intended as a substitute for professional medical care. Always follow your healthcare professional's instructions.                Follow-ups after your visit        Follow-up notes from your care team     Return for 6 weeks.      Your next 10 appointments already scheduled     Aug 03, 2017 12:00 PM CDT   (Arrive by 11:45 AM)   [facility] [place] [facility]    [address] [place], [place] [address] [place] [address] [phone]            Aug 11, 2017  2:45 PM CDT   (Arrive by 2:30 PM)   [facility] IAN Redd [facility]    [address] [facility] [place] [address] [phone]            Aug 17, 2017  9:00 AM CDT   (Arrive by 8:45 AM)   [facility] [place] [facility] )    [address] [place], [place] [address] [place] [address]   [phone]              Who to contact     If you have questions or need follow up information about today's clinic visit or your schedule please contact [facility] directly at [phone].  Normal or non-critical lab and imaging results will be communicated to you by MyChart, letter or phone within 4 business days after the clinic has received the results. If you do not hear from us within 7 days, please  "contact the clinic through Cell[employer] or phone. If you have a critical or abnormal lab result, we will notify you by phone as soon as possible.  Submit refill requests through Cellrox or call your pharmacy and they will forward the refill request to us. Please allow 3 business days for your refill to be completed.          Additional Information About Your Visit        Cellrox Information     Cellrox lets you send messages to your doctor, view your test results, renew your prescriptions, schedule appointments and more. To sign up, go to [email or web address] . Click on \"Log in\" on the left side of the screen, which will take you to the Welcome page. Then click on \"Sign up Now\" on the right side of the page.     You will be asked to enter the access code listed below, as well as some personal information. Please follow the directions to create your username and password.     Your access code is: [email or web address]  Expires: 2017  9:34 AM     Your access code will  in 90 days. If you need help or a new code, please call your [facility] clinic or [phone].        Care EveryWhere ID     This is your Care EveryWhere ID. This could be used by other organizations to access your [facility] medical records  [id number]        Your Vitals Were     Pulse Temperature Respirations Height Pulse Oximetry BMI (Body Mass Index)    61 97.2  F (36.2  C) (Tympanic) 18 5' (1.524 m) 97% 19.92 kg/m2       Blood Pressure from Last 3 Encounters:   17 136/68   17 138/76   17 138/88    Weight from Last 3 Encounters:   17 102 lb (46.3 kg)   17 98 lb (44.5 kg)   17 97 lb 12.8 oz (44.4 kg)              Today, you had the following     No orders found for display         Today's Medication Changes          These changes are accurate as of: 17 11:58 AM.  If you have any questions, ask your nurse or doctor.               Start taking these medicines.        Dose/Directions    buPROPion 300 MG 24 hr " tablet   Commonly known as:  WELLBUTRIN XL   Used for:  Severe episode of recurrent major depressive disorder, without psychotic features (H)   Replaces:  buPROPion 150 MG 12 hr tablet   Started by:  Naomi Cook APRN CNP        Dose:  300 mg   Take 1 tablet (300 mg) by mouth every morning   Quantity:  30 tablet   Refills:  3         Stop taking these medicines if you haven't already. Please contact your care team if you have questions.     buPROPion 150 MG 12 hr tablet   Commonly known as:  WELLBUTRIN SR   Replaced by:  buPROPion 300 MG 24 hr tablet   Stopped by:  Naomi Cook APRN CNP           buPROPion 200 MG 12 hr tablet   Commonly known as:  WELLBUTRIN SR   Stopped by:  Naomi Cook APRN CNP                Where to get your medicines      These medications were sent to [facility] [address] [address]     Phone:  [phone]     buPROPion 300 MG 24 hr tablet                Primary Care Provider Office Phone # Fax #    Keith Lemons [phone]       [facility] [address] [facility] [address] [address] [address]        Equal Access to Services     [facility]: Hadii michael kelly hadasho Sosera, waaxda luqadaha, qaybta kaalmada adediannayada, melania crook . So [facility] [phone].    ATENCIÓN: Si habla español, tiene a pittman disposición servicios gratuitos de asistencia lingüística. [facility] [phone].    We comply with applicable federal civil rights laws and [place] laws. We do not discriminate on the basis of race, color, national origin, age, disability sex, sexual orientation or gender identity.            Thank you!     Thank you for choosing [facility]  for your care. Our goal is always to provide you with excellent care. Hearing back from our patients is one way we can continue to improve our services. Please take a few minutes to complete the written survey that you may receive in the mail after  your visit with us. Thank you!             Your Updated Medication List - Protect others around you: Learn how to safely use, store and throw away your medicines at www.disposemymeds.org.          This list is accurate as of: 7/21/17 11:58 AM.  Always use your most recent med list.                   Brand Name Dispense Instructions for use Diagnosis    alum & mag hydroxide-simethicone 200-200-20 MG/5ML Susp suspension    MYLANTA/MAALOX     Take 30 mLs by mouth every 4 hours as needed for indigestion        amLODIPine 5 MG tablet    NORVASC    30 tablet    Take 1 tablet (5 mg) by mouth daily    Essential hypertension       buPROPion 300 MG 24 hr tablet    WELLBUTRIN XL    30 tablet    Take 1 tablet (300 mg) by mouth every morning    Severe episode of recurrent major depressive disorder, without psychotic features (H)       cholecalciferol 5000 UNITS Caps     30 capsule    Take 1 capsule (5,000 Units) by mouth daily    Bipolar 1 disorder (H)       * CLONIDINE HCL PO      Take 0.1 mg by mouth 2 times daily        * cloNIDine 0.1 MG tablet    CATAPRES    30 tablet    Take one (1) tablet BY MOUTH every four (4) HOURS as needed patient takes twice daily according to [facility] med records    Essential hypertension       donepezil 10 MG tablet    ARICEPT    30 tablet    Take 1 tablet (10 mg) by mouth At Bedtime    Vascular dementia with behavior disturbance       * DULoxetine 60 MG EC capsule    CYMBALTA    30 capsule    Take 1 capsule (60 mg) by mouth daily    Recurrent major depressive disorder, in partial remission (H)       * DULoxetine 30 MG EC capsule    CYMBALTA    30 capsule    Take one (1) capsule BY MOUTH daily with 60mg    Recurrent major depressive disorder, in partial remission (H)       * GABAPENTIN PO      Take 50 mg by mouth as needed Also takes every 2hrs prn for anxeity 50ml        * gabapentin 100 MG capsule    NEURONTIN    90 capsule    Take 1 capsule (100 mg) by mouth 3 times daily    Anxiety        levothyroxine 75 MCG tablet    SYNTHROID/LEVOTHROID    30 tablet    Take 1 tablet (75 mcg) by mouth daily    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 substances       memantine 10 MG tablet    NAMENDA    30 tablet    Take 1 tablet (10 mg) by mouth daily    Vascular dementia with behavior disturbance       multivitamin, therapeutic with minerals Tabs tablet     30 each    Take 1 tablet by mouth daily    Bipolar 1 disorder (H)       PRILOSEC PO      Take 20 mg by mouth every morning        Sennosides 25 MG Tabs    SENNA MAXIMUM STRENGTH    30 tablet    Take 1 tablet by mouth daily    Slow transit constipation       silver sulfADIAZINE 1 % cream    SILVADENE     Apply topically 2 times daily        tolterodine 4 MG 24 hr capsule    DETROL LA    30 capsule    Take 1 capsule (4 mg) by mouth daily    Urge incontinence of urine       traZODone 100 MG tablet    DESYREL    30 tablet    Take 1 tablet (100 mg) by mouth At Bedtime    Recurrent major depressive disorder, in partial remission (H)       * Notice:  This list has 6 medication(s) that are the same as other medications prescribed for you. Read the directions carefully, and ask your doctor or other care provider to review them with you.

## 2017-07-21 NOTE — PATIENT INSTRUCTIONS
1. Severe episode of recurrent major depressive disorder, without psychotic features (H)    You will stop taking the Wellbutrin SR twice daily and start    - buPROPion (WELLBUTRIN XL) 300 MG 24 hr tablet; Take 1 tablet (300 mg) by mouth every morning  Dispense: 30 tablet; Refill: 3                        Depression Affects Your Mind and Body  Everyone feels sad or  blue  from time to time for a few days or weeks. Depression is when these feelings don't go away and they interfere with daily life.  Depression is a real illness that can develop at any age. It is one of the most common mental health problems in the [place] Depression makes you feel sad, helpless, and hopeless. It gets in the way of your life and relationships. It inhibits your ability to think and act. But, with help, you can feel better again.      When I was depressed, I felt awful. I was so tired all the time I could hardly think, but at night I couldn t fall asleep. My head hurt. My stomach hurt. I didn t know what was wrong with me.    Depression affects your whole body  Brain chemicals affect your body as well as your mood. So depression may do more than just make you feel low. You may also feel bad physically. Depression can:    Cause trouble with mental tasks such as remembering, concentrating, or making decisions    Make you feel nervous and jumpy    Cause trouble sleeping. Or you may sleep too much    Change your appetite    Cause headaches, stomachaches, or other aches and pains    Drain your body of energy  Depression and other illness  It is common for people who have chronic health problems to also have depression. It can often be hard to tell which one caused the other. A person might become depressed after finding out they have a health problem. But some studies suggest being depressed may make certain health problems more likely. And some depressed people stop taking care of themselves. This may make them more likely to get sick.  Date  Last Reviewed: 1/1/2017 2000-2017 [employer]. [address] [place] [address], [place] [address]. All rights reserved. This information is not intended as a substitute for professional medical care. Always follow your healthcare professional's instructions.

## 2017-07-21 NOTE — PROGRESS NOTES
"[facility]   Psychiatric Progress Note    Subjective   This is a 76 year old female with bipolar 1 disorder, currently severely depressed. Tamera informs me she had her therapy session w/ Fouzia yesterday which she finds helpful as she is able to attend her session, \"try and let go of the past\" and she looks forward to this as it is the only time she feels as if she is able to \"unload it all\". Today Tamera reports her mood is no better, she admits feeling depressed, denies SI. She questions if life is worth living admitting that she is unable to let go of the past. She requests a change in her antidepressants. I shared with Tamera, she is currently taking Cymbalta 90 mg daily which is a high dose for someone her age, in addition to wellbutrin  mg BID. Tamera has had numerous reactions to several SSRI's in the past therefore this class has not been utilized. I discussed letting go of her past, self forgiveness, and creating her own happiness none of which medications will do. I will change her wellbutrin to Wellbutrin  mg daily to see if the sustained release provides her with any mood uplift. She reports getting out to play bingo and shopping with her [email or web address] worker. She denies wanting to die or SI. At the conclusion of our appointment she stated \"so this I have to fix right?\"         DIagnoses:        Diagnosis Comments   1. Severe episode of recurrent major depressive disorder, without psychotic features (H)  buPROPion (WELLBUTRIN XL) 300 MG 24 hr tablet        Attestation:  Patient has been seen and evaluated by me,  IAN Robles CNP          Interim History:   The patient's care was discussed with the treatment team and chart notes were reviewed.          Medications:      Current Outpatient Prescriptions   Medication     buPROPion (WELLBUTRIN XL) 300 MG 24 hr tablet     donepezil (ARICEPT) 10 MG tablet     cloNIDine (CATAPRES) 0.1 MG tablet     gabapentin (NEURONTIN) 100 MG capsule     " "DULoxetine (CYMBALTA) 30 MG EC capsule     CLONIDINE HCL PO     levothyroxine (SYNTHROID/LEVOTHROID) 75 MCG tablet     traZODone (DESYREL) 100 MG tablet     tolterodine (DETROL LA) 4 MG 24 hr capsule     silver sulfADIAZINE (SILVADENE) 1 % cream     Omeprazole (PRILOSEC PO)     GABAPENTIN PO     DULoxetine (CYMBALTA) 60 MG EC capsule     amLODIPine (NORVASC) 5 MG tablet     Sennosides (SENNA MAXIMUM STRENGTH) 25 MG TABS     memantine (NAMENDA) 10 MG tablet     multivitamin, therapeutic with minerals (THERA-VIT-M) TABS     cholecalciferol 5000 UNITS CAPS     alum & mag hydroxide-simethicone (MYLANTA/MAALOX) 200-200-20 MG/5ML SUSP     [DISCONTINUED] buPROPion (WELLBUTRIN SR) 200 MG 12 hr tablet     [DISCONTINUED] buPROPion (WELLBUTRIN SR) 150 MG 12 hr tablet     No current facility-administered medications for this visit.                   Allergies:     Allergies   Allergen Reactions     Benadryl [Diphenhydramine] Unknown     Patient states she is allergic to benadryl     Depakote      Escitalopram      Suicidal ideation     Famotidine      Fluoxetine      Suicidal ideation     Hydrocodone      Lithium Swelling     Other [Seasonal Allergies]      Pet hair       Penicillins Nausea     Risperdal [Risperidone] Unknown     Patient states she is allergic to risperdal       Valproic Acid Unknown     \"seizures\"        10 point ROS negative.       Psychiatric Examination:   /68 (BP Location: Right arm, Patient Position: Sitting, Cuff Size: Adult Regular)  Pulse 61  Temp 97.2  F (36.2  C) (Tympanic)  Resp 18  Ht 5' (1.524 m)  Wt 102 lb (46.3 kg)  SpO2 97%  BMI 19.92 kg/m2  Weight is 102 lbs 0 oz  Body mass index is 19.92 kg/(m^2).    Appearance:  awake, alert and adequately groomed  Attitude:  cooperative  Eye Contact:  good  Mood:  sad   Affect:  mood congruent and intensity is blunted  Speech:  clear, coherent  Psychomotor Behavior:  fidgeting  Thought Process:  goal oriented  Associations:  no loose " associations  Thought Content:  no evidence of suicidal ideation or homicidal ideation and no evidence of psychotic thought  Insight:  fair  Judgment:  fair  Oriented to:  time, person, and place  Attention Span and Concentration:  fair  Recent and Remote Memory:  remote memory is fairly well preserved, recent memory poor  Fund of Knowledge: appropriate  Muscle Strength and Tone: deconditioned  Gait and Station: Normal  Perception no perceptual disorder noted.         Labs:   No results found for this or any previous visit (from the past 24 hour(s)).          Assessment/ Plan:    1) D/C Wellbutrin  mg BID.       Start Wellbutrin  mg daily    2) reiterated the importance of getting outdoors as much as possible.    3) Reinforced pts therapy for forgiveness.    No new medications ordered at today's visit.

## 2017-07-22 ASSESSMENT — PATIENT HEALTH QUESTIONNAIRE - PHQ9: SUM OF ALL RESPONSES TO PHQ QUESTIONS 1-9: 24

## 2017-07-22 ASSESSMENT — ANXIETY QUESTIONNAIRES: GAD7 TOTAL SCORE: 19

## 2017-07-26 NOTE — PROGRESS NOTES
Progress Note    Client Name: Tamera Kumar  Date: July 20, 2017         Service Type: Individual      Session Start Time: 2:00  Session End Time: 2:58      Session Length: 58 minutes     Session #: 7     Attendees: Client attended alone    DSM-V Diagnoses:   296.53 Bipolar I Disorder Current or Most Recent Episode Depressed, Severe    DA Date: 1/23/17    Treatment Plan Due: 8/25/17  PHQ-9 :   PHQ-9 SCORE 6/15/2017 7/20/2017 7/21/2017   Total Score 11 23 24      BRIGID-7 :    BRIGID-7 SCORE 6/15/2017 7/20/2017 7/21/2017   Total Score 4 14 19           DATA      Progress Since Last Session (Related to Symptoms / Goals / Homework):   Symptoms: worsening    Homework: Completed in session     Current / Ongoing Stressors and Concerns:  Tamera states that she is doing terrible. She explained that her symptoms of depression have increased. She reports constant ruminating thoughts about the past. Tamera reports increased feelings of wanting to isolate and not engage in activities.      Treatment Objective(s) Addressed in This Session:   Objective 1A (Client Action)                  Client will learn IMR and CBT skills to use for reducing depressed mood.     Intervention:   Provided psycho-education utilizing CBT, NATs and cognitive restructuring. Worked with Tamera on relapse prevention, working on these symptoms before they are worse.     Response to Interventions:   Tamera stated she understood this information and that she would use it at home.      ASSESSMENT: Current Emotional / Mental Status (status of significant symptoms):   Risk status (Self / Other harm or suicidal ideation)   Client denies current fears or concerns for personal safety.   Client denies current or recent suicidal ideation.   Client denies current or recent homicidal ideation or behaviors.   Client denies current or recent self injurious behavior or ideation.   Client denies other safety concerns.   A safety  and risk management plan has not been developed at this time, however client was given the after-hours number / 911 should there be a change in any of these risk factors.     Appearance:   Appropriate    Eye Contact:   Fair    Psychomotor Behavior: Restless    Attitude:   Cooperative    Orientation:   All   Speech    Rate / Production: Normal     Volume:  Normal    Mood:    Anxious  Depressed    Affect:    Worrisome    Thought Content:  Rumination    Thought Form:  Coherent  Circumstantial   Insight:    Poor         Medication Compliance:   Yes     Changes in Health Issues:   None reported     Chemical Use Review:   Substance Use: Chemical use reviewed, no active concerns identified      Tobacco Use: No current tobacco use.       Collateral Reports Completed:   Not Applicable    PLAN: (Client Tasks / Therapist Tasks / Other)  Tamera was asked to practice the skills at home and to return for follow-up appointments.    Fouzia Mason, CAROLINESW

## 2017-07-27 ASSESSMENT — PATIENT HEALTH QUESTIONNAIRE - PHQ9: SUM OF ALL RESPONSES TO PHQ QUESTIONS 1-9: 23

## 2017-07-27 ASSESSMENT — ANXIETY QUESTIONNAIRES: GAD7 TOTAL SCORE: 14

## 2017-08-03 ENCOUNTER — OFFICE VISIT (OUTPATIENT)
Dept: PSYCHOLOGY | Facility: OTHER | Age: 77
End: 2017-08-03
Attending: SOCIAL WORKER
Payer: COMMERCIAL

## 2017-08-03 DIAGNOSIS — F31.30 BIPOLAR I DISORDER, MOST RECENT EPISODE DEPRESSED (H): Primary | ICD-10-CM

## 2017-08-03 PROCEDURE — 90837 PSYTX W PT 60 MINUTES: CPT | Performed by: SOCIAL WORKER

## 2017-08-03 ASSESSMENT — ANXIETY QUESTIONNAIRES
IF YOU CHECKED OFF ANY PROBLEMS ON THIS QUESTIONNAIRE, HOW DIFFICULT HAVE THESE PROBLEMS MADE IT FOR YOU TO DO YOUR WORK, TAKE CARE OF THINGS AT HOME, OR GET ALONG WITH OTHER PEOPLE: SOMEWHAT DIFFICULT
1. FEELING NERVOUS, ANXIOUS, OR ON EDGE: SEVERAL DAYS
2. NOT BEING ABLE TO STOP OR CONTROL WORRYING: MORE THAN HALF THE DAYS
5. BEING SO RESTLESS THAT IT IS HARD TO SIT STILL: NOT AT ALL
6. BECOMING EASILY ANNOYED OR IRRITABLE: SEVERAL DAYS
GAD7 TOTAL SCORE: 6
7. FEELING AFRAID AS IF SOMETHING AWFUL MIGHT HAPPEN: NOT AT ALL
3. WORRYING TOO MUCH ABOUT DIFFERENT THINGS: MORE THAN HALF THE DAYS

## 2017-08-03 ASSESSMENT — PATIENT HEALTH QUESTIONNAIRE - PHQ9: 5. POOR APPETITE OR OVEREATING: NOT AT ALL

## 2017-08-03 NOTE — MR AVS SNAPSHOT
After Visit Summary   8/3/2017    Tamera Kumar    MRN: [id number]           Patient Information     Date Of Birth          1940        Visit Information        Provider Department      [facility]2017 [facility]        Today's Diagnoses     Bipolar I disorder, most recent episode depressed (H)    -  1       Follow-ups after your visit        Your next 10 appointments already scheduled     Aug 17, 2017  9:00 AM CDT   (Arrive by 8:45 AM)   Return Visit with Fouzia Mason [facility] [place] [facility] ([facility] )    [address] [place], [place] [address] [place] [address]   [phone]            Sep 15, 2017  1:45 PM CDT   (Arrive by 1:30 PM)   Return Visit with IAN Redd [facility] ([facility] )    [address] [place], [place] [address] [place] [address]   [phone]            Sep 18, 2017 10:00 AM CDT   (Arrive by 9:45 AM)   [facility]    [address] [place] [address]   [phone]            Oct 02, 2017 10:00 AM CDT   (Arrive by 9:45 AM)   Return Visit with Fouzia Mason [facility] ([facility] )    [address] [place] [address]   [phone]              Who to contact     If you have questions or need follow up information about today's clinic visit or your schedule please contact [facility] directly at [phone].  Normal or non-critical lab and imaging results will be communicated to you by MyChart, letter or phone within 4 business days after the clinic has received the results. If you do not hear from us within 7 days, please contact the clinic through MyChart or phone. If you have a critical or abnormal lab result, we will notify you by phone as soon as possible.  Submit refill requests through [email or web address] or  "call your pharmacy and they will forward the refill request to us. Please allow 3 business days for your refill to be completed.          Additional Information About Your Visit        MyChart Information     [employer] lets you send messages to your doctor, view your test results, renew your prescriptions, schedule appointments and more. To sign up, go to [email or web address] . Click on \"Log in\" on the left side of the screen, which will take you to the Welcome page. Then click on \"Sign up Now\" on the right side of the page.     You will be asked to enter the access code listed below, as well as some personal information. Please follow the directions to create your username and password.     Your access code is: [email or web address]  Expires: 2017  7:02 AM     Your access code will  in 90 days. If you need help or a new code, please call your [facility] clinic or [phone].        Care EveryWhere ID     This is your Care EveryWhere ID. This could be used by other organizations to access your [facility] medical records  [id number]         Blood Pressure from Last 3 Encounters:   17 158/68   17 136/68   17 138/76    Weight from Last 3 Encounters:   17 108 lb (49 kg)   17 102 lb (46.3 kg)   17 98 lb (44.5 kg)              Today, you had the following     No orders found for display       Primary Care Provider Office Phone # Fax #    Keith Lemons [phone]       [facility] [address] [place] [address]        Equal Access to Services     [facility]: Hadii michael kelly hadfarzaneh Sosera, waaxda luqadaha, qaybta kaalmelania alves. So [facility] [phone].    ATENCIÓN: Si habla español, tiene a pittman disposición servicios gratuitos de asistencia lingüística. Llame al [phone].    We comply with applicable federal civil rights laws and [place] laws. We do not discriminate on the basis of race, color, national " origin, age, disability sex, sexual orientation or gender identity.            Thank you!     Thank you for choosing [facility]  for your care. Our goal is always to provide you with excellent care. Hearing back from our patients is one way we can continue to improve our services. Please take a few minutes to complete the written survey that you may receive in the mail after your visit with us. Thank you!             Your Updated Medication List - Protect others around you: Learn how to safely use, store and throw away your medicines at www.disposemymeds.org.          This list is accurate as of: 8/3/17 11:59 PM.  Always use your most recent med list.                   Brand Name Dispense Instructions for use Diagnosis    alum & mag hydroxide-simethicone 200-200-20 MG/5ML Susp suspension    MYLANTA/MAALOX     Take 30 mLs by mouth every 4 hours as needed for indigestion        amLODIPine 5 MG tablet    NORVASC    30 tablet    Take 1 tablet (5 mg) by mouth daily    Essential hypertension       buPROPion 300 MG 24 hr tablet    WELLBUTRIN XL    30 tablet    Take 1 tablet (300 mg) by mouth every morning    Severe episode of recurrent major depressive disorder, without psychotic features (H)       cholecalciferol 5000 UNITS Caps     30 capsule    Take 1 capsule (5,000 Units) by mouth daily    Bipolar 1 disorder (H)       CLONIDINE HCL PO      Take 0.1 mg by mouth 2 times daily        donepezil 10 MG tablet    ARICEPT    30 tablet    Take 1 tablet (10 mg) by mouth At Bedtime    Vascular dementia with behavior disturbance       DULoxetine 60 MG EC capsule    CYMBALTA    30 capsule    Take 1 capsule (60 mg) by mouth daily    Recurrent major depressive disorder, in partial remission (H)       * GABAPENTIN PO      Take 50 mg by mouth as needed Also takes every 2hrs prn for anxeity 50ml        * gabapentin 100 MG capsule    NEURONTIN    90 capsule    Take 1 capsule (100 mg) by mouth 3 times daily    Anxiety        levothyroxine 75 MCG tablet    SYNTHROID/LEVOTHROID    30 tablet    Take 1 tablet (75 mcg) by mouth daily    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 substances       memantine 10 MG tablet    NAMENDA    30 tablet    Take 1 tablet (10 mg) by mouth daily    Vascular dementia with behavior disturbance       multivitamin, therapeutic with minerals Tabs tablet     30 each    Take 1 tablet by mouth daily    Bipolar 1 disorder (H)       PRILOSEC PO      Take 20 mg by mouth every morning        Sennosides 25 MG Tabs    SENNA MAXIMUM STRENGTH    30 tablet    Take 1 tablet by mouth daily    Slow transit constipation       traZODone 100 MG tablet    DESYREL    30 tablet    Take 1 tablet (100 mg) by mouth At Bedtime    Recurrent major depressive disorder, in partial remission (H)       * Notice:  This list has 2 medication(s) that are the same as other medications prescribed for you. Read the directions carefully, and ask your doctor or other care provider to review them with you.

## 2017-08-11 ENCOUNTER — OFFICE VISIT (OUTPATIENT)
Dept: PSYCHIATRY | Facility: OTHER | Age: 77
End: 2017-08-11
Attending: NURSE PRACTITIONER
Payer: COMMERCIAL

## 2017-08-11 VITALS
HEIGHT: 60 IN | TEMPERATURE: 97.9 F | SYSTOLIC BLOOD PRESSURE: 158 MMHG | BODY MASS INDEX: 21.2 KG/M2 | RESPIRATION RATE: 18 BRPM | DIASTOLIC BLOOD PRESSURE: 68 MMHG | WEIGHT: 108 LBS | OXYGEN SATURATION: 97 % | HEART RATE: 73 BPM

## 2017-08-11 DIAGNOSIS — G25.71 AKATHISIA: Primary | ICD-10-CM

## 2017-08-11 DIAGNOSIS — F33.41 RECURRENT MAJOR DEPRESSIVE DISORDER, IN PARTIAL REMISSION (H): ICD-10-CM

## 2017-08-11 DIAGNOSIS — F31.9 BIPOLAR 1 DISORDER (H): ICD-10-CM

## 2017-08-11 PROCEDURE — 99212 OFFICE O/P EST SF 10 MIN: CPT

## 2017-08-11 PROCEDURE — 99214 OFFICE O/P EST MOD 30 MIN: CPT | Performed by: NURSE PRACTITIONER

## 2017-08-11 RX ORDER — BENZTROPINE MESYLATE 0.5 MG/1
0.5 TABLET ORAL 2 TIMES DAILY
Qty: 60 TABLET | Refills: 3 | Status: SHIPPED | OUTPATIENT
Start: 2017-08-11 | End: 2017-11-17

## 2017-08-11 ASSESSMENT — ANXIETY QUESTIONNAIRES
1. FEELING NERVOUS, ANXIOUS, OR ON EDGE: NEARLY EVERY DAY
7. FEELING AFRAID AS IF SOMETHING AWFUL MIGHT HAPPEN: NOT AT ALL
5. BEING SO RESTLESS THAT IT IS HARD TO SIT STILL: SEVERAL DAYS
6. BECOMING EASILY ANNOYED OR IRRITABLE: SEVERAL DAYS
IF YOU CHECKED OFF ANY PROBLEMS ON THIS QUESTIONNAIRE, HOW DIFFICULT HAVE THESE PROBLEMS MADE IT FOR YOU TO DO YOUR WORK, TAKE CARE OF THINGS AT HOME, OR GET ALONG WITH OTHER PEOPLE: VERY DIFFICULT
GAD7 TOTAL SCORE: 14
3. WORRYING TOO MUCH ABOUT DIFFERENT THINGS: NEARLY EVERY DAY
2. NOT BEING ABLE TO STOP OR CONTROL WORRYING: NEARLY EVERY DAY

## 2017-08-11 ASSESSMENT — PAIN SCALES - GENERAL: PAINLEVEL: NO PAIN (0)

## 2017-08-11 ASSESSMENT — PATIENT HEALTH QUESTIONNAIRE - PHQ9
5. POOR APPETITE OR OVEREATING: NEARLY EVERY DAY
SUM OF ALL RESPONSES TO PHQ QUESTIONS 1-9: 25

## 2017-08-11 NOTE — MR AVS SNAPSHOT
After Visit Summary   8/11/2017    Tamera Kumar    MRN: [id number]           Patient Information     Date Of Birth          1940        Visit Information        Provider Department      8/11/2017 2:45 PM Naomi Cook APRN [facility]        Today's Diagnoses     Akathisia    -  1    Bipolar 1 disorder (H)        Recurrent major depressive disorder, in partial remission (H)          Care Instructions    1. Akathisia    - benztropine (COGENTIN) 0.5 MG tablet; Take 1 tablet (0.5 mg) by mouth 2 times daily  Dispense: 60 tablet; Refill: 3 This should help with the tremors.    2. Bipolar 1 disorder (H)      3. Recurrent major depressive disorder, in partial remission (H)       Continue taking your current medications. Practice Mindfulness. Paperwork completed for [facility] and sent with patient.          Mind-Body Therapy  Mind-body therapy is based on the belief that thoughts and physical health are closely connected. Your attitudes, beliefs, and outlook all can affect your physical health. And your physical health also can impact your mental and emotional well-being. By being aware of the connection and by learning new ways to relax, you can enhance your general health.    Uniting mind and body  Mind-body therapy is a way to improve the link between mental and physical health. By doing so, you may find untapped resources within yourself that may enhance your general health and mental outlook.  The power of suggestion is key to this type of therapy. A therapist may give suggestions that can help you better unite mind with body. Or, a biofeedback machine that uses sensory feedback as body functions change may give the suggestions.  The way you receive the suggestion matters less than what it teaches you about how to relax. A relaxed mind and body are key to this therapy. In fact, enhanced relaxation is often a main goal of therapy.  Questions for the mind-body therapist  Before  you decide whether to have mind-body therapy, talk with at least one professional who practices it. Asking him or her some of these questions may help you make an informed choice:    What is your training? How long have you been practicing?    What results have you had working with people who have problems like mine?    What will a typical visit be like?    How long will treatment take? How much will it cost?  Common choices in mind-body therapy    Biofeedback: Sensory feedback is used to help control body function.    Guided imagery: Suggestion or thought is used to enhance awareness.    Hypnosis: Suggestion or relaxation is used to help influence mental state.    Meditation and prayer: Thought or spiritual belief is used to improve health.    Progressive relaxation: Focused awareness of the body is used to reduce stress.    Yoga: Movement, breathing, and thought are used to improve well-being.  Resources  Research mind-body therapy in the library, on the Internet, or by contacting:    Association for Applied Psychophysiology and Biofeedback  www.aapb.org    The Academy for Guided Imagery   [email or web address]    Center for Mind-Body Medicine   www.cmbm.org    American Society of Clinical Hypnosis  www.asch.net   Date Last Reviewed: 7/18/2015 2000-2017 [employer]. [address] [place] [address]. All rights reserved. This information is not intended as a substitute for professional medical care. Always follow your healthcare professional's instructions.                Follow-ups after your visit        Follow-up notes from your care team     Return in about 4 weeks (around 9/8/2017).      Your next 10 appointments already scheduled     Aug 17, 2017  9:00 AM CDT   (Arrive by 8:45 AM)   Return Visit with Fouzia Mason, [facility] [facility] ([facility] )    [address] [place] [id number]   [phone]            Sep 15, 2017  1:45 PM CDT   (Arrive  "by 1:30 PM)   Return Visit with IAN Redd [facility] ([facility] )    [address] [place], [place] [address] [place] [address] [phone]            Sep 18, 2017 10:00 AM CDT   (Arrive by 9:45 AM)   Return Visit with Fouzia Posadaerine Isabella, [facility] ([facility] [place] )    [address] [place] [address]  [place] [address] [phone]            Oct 02, 2017 10:00 AM CDT   (Arrive by 9:45 AM)   Return Visit with Fouzia Sloan Isabella, [facility]    [address] [place], [place] [address] [place] [address] [phone]              Who to contact     If you have questions or need follow up information about today's clinic visit or your schedule please contact [facility] directly at [phone].  Normal or non-critical lab and imaging results will be communicated to you by [employer], letter or phone within 4 business days after the clinic has received the results. If you do not hear from us within 7 days, please contact the clinic through [employer] or phone. If you have a critical or abnormal lab result, we will notify you by phone as soon as possible.  Submit refill requests through [employer] or call your pharmacy and they will forward the refill request to us. Please allow 3 business days for your refill to be completed.          Additional Information About Your Visit        Terra Tech[employer]DIRAmed Information     [employer] lets you send messages to your doctor, view your test results, renew your prescriptions, schedule appointments and more. To sign up, go to [email or web address] . Click on \"Log in\" on the left side of the screen, which will take you to the Welcome page. Then click on \"Sign up Now\" on the right side of the page.     You will be asked to enter the access code listed below, as well as some personal information. Please follow the directions to create your username and password.     Your access " code is: [email or web address]  Expires: 2017  7:02 AM     Your access code will  in 90 days. If you need help or a new code, please call your [facility] clinic or [phone].        Care EveryWhere ID     This is your Care EveryWhere ID. This could be used by other organizations to access your [facility] medical records  [id number]        Your Vitals Were     Pulse Temperature Respirations Height Pulse Oximetry BMI (Body Mass Index)    73 97.9  F (36.6  C) (Tympanic) 18 5' (1.524 m) 97% 21.09 kg/m2       Blood Pressure from Last 3 Encounters:   17 158/68   17 136/68   17 138/76    Weight from Last 3 Encounters:   17 108 lb (49 kg)   17 102 lb (46.3 kg)   17 98 lb (44.5 kg)              Today, you had the following     No orders found for display         Today's Medication Changes          These changes are accurate as of: 17 11:59 PM.  If you have any questions, ask your nurse or doctor.               Start taking these medicines.        Dose/Directions    benztropine 0.5 MG tablet   Commonly known as:  COGENTIN   Used for:  Akathisia   Started by:  Naomi Cook APRN CNP        Dose:  0.5 mg   Take 1 tablet (0.5 mg) by mouth 2 times daily   Quantity:  60 tablet   Refills:  3         These medicines have changed or have updated prescriptions.        Dose/Directions    CLONIDINE HCL PO   This may have changed:  Another medication with the same name was removed. Continue taking this medication, and follow the directions you see here.   Changed by:  Naomi Cook APRN CNP        Dose:  0.1 mg   Take 0.1 mg by mouth 2 times daily   Refills:  0       DULoxetine 60 MG EC capsule   Commonly known as:  CYMBALTA   This may have changed:  Another medication with the same name was removed. Continue taking this medication, and follow the directions you see here.   Used for:  Recurrent major depressive disorder, in partial remission (H)   Changed by:  Naomi Cook APRN CNP         Dose:  60 mg   Take 1 capsule (60 mg) by mouth daily   Quantity:  30 capsule   Refills:  1         Stop taking these medicines if you haven't already. Please contact your care team if you have questions.     silver sulfADIAZINE 1 % cream   Commonly known as:  SILVADENE   Stopped by:  Naomi Cook APRN CNP           tolterodine 4 MG 24 hr capsule   Commonly known as:  DETROL LA   Stopped by:  Naomi Cook APRN CNP                Where to get your medicines      These medications were sent to [facility] [address] [address]     Phone:  [phone]     benztropine 0.5 MG tablet                Primary Care Provider Office Phone # Fax #    Keith Lemons [phone]       [facility] [address] [place] [address]        Equal Access to Services     [facility]: Hadii michael kelly hadasho Sosera, waaxda luqadaha, qaybta kaalmada adeegyada, melania crook . So [facility] [phone].    ATENCIÓN: Si habla español, tiene a pittman disposición servicios gratuitos de asistencia lingüística. Preethiame al [phone].    We comply with applicable federal civil rights laws and [place] laws. We do not discriminate on the basis of race, color, national origin, age, disability sex, sexual orientation or gender identity.            Thank you!     Thank you for choosing [facility]  for your care. Our goal is always to provide you with excellent care. Hearing back from our patients is one way we can continue to improve our services. Please take a few minutes to complete the written survey that you may receive in the mail after your visit with us. Thank you!             Your Updated Medication List - Protect others around you: Learn how to safely use, store and throw away your medicines at www.disposemymeds.org.          This list is accurate as of: 8/11/17 11:59 PM.  Always use your most recent med list.                    Brand Name Dispense Instructions for use Diagnosis    alum & mag hydroxide-simethicone 200-200-20 MG/5ML Susp suspension    MYLANTA/MAALOX     Take 30 mLs by mouth every 4 hours as needed for indigestion        amLODIPine 5 MG tablet    NORVASC    30 tablet    Take 1 tablet (5 mg) by mouth daily    Essential hypertension       benztropine 0.5 MG tablet    COGENTIN    60 tablet    Take 1 tablet (0.5 mg) by mouth 2 times daily    Akathisia       buPROPion 300 MG 24 hr tablet    WELLBUTRIN XL    30 tablet    Take 1 tablet (300 mg) by mouth every morning    Severe episode of recurrent major depressive disorder, without psychotic features (H)       cholecalciferol 5000 UNITS Caps     30 capsule    Take 1 capsule (5,000 Units) by mouth daily    Bipolar 1 disorder (H)       CLONIDINE HCL PO      Take 0.1 mg by mouth 2 times daily        donepezil 10 MG tablet    ARICEPT    30 tablet    Take 1 tablet (10 mg) by mouth At Bedtime    Vascular dementia with behavior disturbance       DULoxetine 60 MG EC capsule    CYMBALTA    30 capsule    Take 1 capsule (60 mg) by mouth daily    Recurrent major depressive disorder, in partial remission (H)       * GABAPENTIN PO      Take 50 mg by mouth as needed Also takes every 2hrs prn for anxeity 50ml        * gabapentin 100 MG capsule    NEURONTIN    90 capsule    Take 1 capsule (100 mg) by mouth 3 times daily    Anxiety       levothyroxine 75 MCG tablet    SYNTHROID/LEVOTHROID    30 tablet    Take 1 tablet (75 mcg) by mouth daily    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 substances       memantine 10 MG tablet    NAMENDA    30 tablet    Take 1 tablet (10 mg) by mouth daily    Vascular dementia with behavior disturbance       multivitamin, therapeutic with minerals Tabs tablet     30 each    Take 1 tablet by mouth daily    Bipolar 1 disorder (H)       PRILOSEC PO      Take 20 mg by mouth every morning        Sennosides 25 MG Tabs    SENNA MAXIMUM STRENGTH    30 tablet     Take 1 tablet by mouth daily    Slow transit constipation       traZODone 100 MG tablet    DESYREL    30 tablet    Take 1 tablet (100 mg) by mouth At Bedtime    Recurrent major depressive disorder, in partial remission (H)       * Notice:  This list has 2 medication(s) that are the same as other medications prescribed for you. Read the directions carefully, and ask your doctor or other care provider to review them with you.

## 2017-08-11 NOTE — NURSING NOTE
Chief Complaint   Patient presents with     RECHECK     depression       Initial /68 (BP Location: Left arm, Patient Position: Chair, Cuff Size: Adult Regular)  Pulse 73  Temp 97.9  F (36.6  C) (Tympanic)  Resp 18  Ht 5' (1.524 m)  Wt 108 lb (49 kg)  SpO2 97%  BMI 21.09 kg/m2 Estimated body mass index is 21.09 kg/(m^2) as calculated from the following:    Height as of this encounter: 5' (1.524 m).    Weight as of this encounter: 108 lb (49 kg).  Medication Reconciliation: complete   Octavia Dewitt

## 2017-08-12 ASSESSMENT — ANXIETY QUESTIONNAIRES: GAD7 TOTAL SCORE: 14

## 2017-08-13 NOTE — PROGRESS NOTES
"[facility]   Psychiatric Progress Note    Subjective   This is a 76 year old female with a significant mental health history which includes; bipolar 1 disorder, dementia, and depression. She is currently residing at [facility] which she refers to as \"[facility]\" given all of elan on. Today she talks of her unhappiness, she wishes to live alone and have her \"little red car\". Discussed if this is reasonable, which she admits \"maybe not\". Discussed the fact she doesn't have a car, could not afford a car, and doesn't have a license. Reviewed her health problems over the past 2 years and her memory loss which would be problematic for he if she were to live alone. Practiced Mindfulness techniques, keeping her in the present moment, and focusing on the good. She is willing to work at this and will work with Fouzia on it as well. Tamera's only c/o is of increased shaking, she has always experienced some akathisia however her hands are \"restless\". Discussed cogentin which she is willing to try.     From a mood standpoint, Tamera believes her mood is related to her environment. I complimented her on her insight and agree the environment may often be problematic however we do have the ability to change our own perception of the environment. Denies any amanda, hypomania, or mood lability.         DIagnoses:    (G25.71) Akathisia  (primary encounter diagnosis)  Comment:   Plan: benztropine (COGENTIN) 0.5 MG tablet            (F31.9) Bipolar 1 disorder (H)  Comment:   Plan:     (F33.41) Recurrent major depressive disorder, in partial remission (H)  Comment:   Plan:     Attestation:  Patient has been seen and evaluated by me,  IAN Robles CNP          Interim History:   The patient's care was discussed with the treatment team and chart notes were reviewed.          Medications:      Current Outpatient Prescriptions:      benztropine (COGENTIN) 0.5 MG tablet, Take 1 tablet (0.5 mg) by mouth 2 times daily, Disp: 60 " tablet, Rfl: 3     buPROPion (WELLBUTRIN XL) 300 MG 24 hr tablet, Take 1 tablet (300 mg) by mouth every morning, Disp: 30 tablet, Rfl: 3     donepezil (ARICEPT) 10 MG tablet, Take 1 tablet (10 mg) by mouth At Bedtime, Disp: 30 tablet, Rfl: 5     gabapentin (NEURONTIN) 100 MG capsule, Take 1 capsule (100 mg) by mouth 3 times daily, Disp: 90 capsule, Rfl: 3     CLONIDINE HCL PO, Take 0.1 mg by mouth 2 times daily, Disp: , Rfl:      levothyroxine (SYNTHROID/LEVOTHROID) 75 MCG tablet, Take 1 tablet (75 mcg) by mouth daily, Disp: 30 tablet, Rfl: 0     traZODone (DESYREL) 100 MG tablet, Take 1 tablet (100 mg) by mouth At Bedtime, Disp: 30 tablet, Rfl: 3     Omeprazole (PRILOSEC PO), Take 20 mg by mouth every morning, Disp: , Rfl:      GABAPENTIN PO, Take 50 mg by mouth as needed Also takes every 2hrs prn for anxeity 50ml, Disp: , Rfl:      DULoxetine (CYMBALTA) 60 MG EC capsule, Take 1 capsule (60 mg) by mouth daily, Disp: 30 capsule, Rfl: 1     amLODIPine (NORVASC) 5 MG tablet, Take 1 tablet (5 mg) by mouth daily, Disp: 30 tablet, Rfl: 3     Sennosides (SENNA MAXIMUM STRENGTH) 25 MG TABS, Take 1 tablet by mouth daily, Disp: 30 tablet, Rfl: 0     memantine (NAMENDA) 10 MG tablet, Take 1 tablet (10 mg) by mouth daily, Disp: 30 tablet, Rfl: 0     multivitamin, therapeutic with minerals (THERA-VIT-M) TABS, Take 1 tablet by mouth daily, Disp: 30 each, Rfl: 0     cholecalciferol 5000 UNITS CAPS, Take 1 capsule (5,000 Units) by mouth daily, Disp: 30 capsule, Rfl: 0     alum & mag hydroxide-simethicone (MYLANTA/MAALOX) 200-200-20 MG/5ML SUSP, Take 30 mLs by mouth every 4 hours as needed for indigestion, Disp: , Rfl:             Allergies:     Allergies   Allergen Reactions     Benadryl [Diphenhydramine] Unknown     Patient states she is allergic to benadryl     Depakote      Escitalopram      Suicidal ideation     Famotidine      Fluoxetine      Suicidal ideation     Hydrocodone      Lithium Swelling     Other [Seasonal  "Allergies]      Pet hair       Penicillins Nausea     Risperdal [Risperidone] Unknown     Patient states she is allergic to risperdal       Valproic Acid Unknown     \"seizures\"      10 point ROS negative except for what is listed in HPI       Psychiatric Examination:   /68 (BP Location: Left arm, Patient Position: Chair, Cuff Size: Adult Regular)  Pulse 73  Temp 97.9  F (36.6  C) (Tympanic)  Resp 18  Ht 5' (1.524 m)  Wt 108 lb (49 kg)  SpO2 97%  BMI 21.09 kg/m2  Weight is 108 lbs 0 oz  Body mass index is 21.09 kg/(m^2).    Appearance:  awake, alert and appeared as age stated  Attitude:  cooperative  Eye Contact:  good  Mood:  better  Affect:  mood congruent  Speech:  clear, coherent  Psychomotor Behavior:  fidgeting and tremor observed   Thought Process:  logical  Associations:  no loose associations  Thought Content:  no evidence of suicidal ideation or homicidal ideation and no evidence of psychotic thought  Insight:  fair  Judgment:  intact  Oriented to:  time, person, and place  Attention Span and Concentration:  intact  Recent and Remote Memory:  fair  Fund of Knowledge: appropriate  Muscle Strength and Tone: deconditioned  Gait and Station: Normal  Perception: No perceptual disorder noted         Labs:   No results found for this or any previous visit (from the past 24 hour(s)).          Assessment/ Plan:    Start Cogentin 0.5 mg BID     For all new medications pt was instructed on the route, dose, potential side effects and anticipated outcomes. Pt verbalized understanding.    "

## 2017-08-13 NOTE — PATIENT INSTRUCTIONS
1. Akathisia    - benztropine (COGENTIN) 0.5 MG tablet; Take 1 tablet (0.5 mg) by mouth 2 times daily  Dispense: 60 tablet; Refill: 3 This should help with the tremors.    2. Bipolar 1 disorder (H)      3. Recurrent major depressive disorder, in partial remission (H)       Continue taking your current medications. Practice Mindfulness. Paperwork completed for [employer] and sent with patient.          Mind-Body Therapy  Mind-body therapy is based on the belief that thoughts and physical health are closely connected. Your attitudes, beliefs, and outlook all can affect your physical health. And your physical health also can impact your mental and emotional well-being. By being aware of the connection and by learning new ways to relax, you can enhance your general health.    Uniting mind and body  Mind-body therapy is a way to improve the link between mental and physical health. By doing so, you may find untapped resources within yourself that may enhance your general health and mental outlook.  The power of suggestion is key to this type of therapy. A therapist may give suggestions that can help you better unite mind with body. Or, a biofeedback machine that uses sensory feedback as body functions change may give the suggestions.  The way you receive the suggestion matters less than what it teaches you about how to relax. A relaxed mind and body are key to this therapy. In fact, enhanced relaxation is often a main goal of therapy.  Questions for the mind-body therapist  Before you decide whether to have mind-body therapy, talk with at least one professional who practices it. Asking him or her some of these questions may help you make an informed choice:    What is your training? How long have you been practicing?    What results have you had working with people who have problems like mine?    What will a typical visit be like?    How long will treatment take? How much will it cost?  Common choices in mind-body  therapy    Biofeedback: Sensory feedback is used to help control body function.    Guided imagery: Suggestion or thought is used to enhance awareness.    Hypnosis: Suggestion or relaxation is used to help influence mental state.    Meditation and prayer: Thought or spiritual belief is used to improve health.    Progressive relaxation: Focused awareness of the body is used to reduce stress.    Yoga: Movement, breathing, and thought are used to improve well-being.  Resources  Research mind-body therapy in the library, on the Internet, or by contacting:    Association for Applied Psychophysiology and Biofeedback  www.aapb.org    The Academy for Guided Imagery   acadgi.com    Center for Mind-Body Medicine   www.cmbm.org    American Society of Clinical Hypnosis  www.asch.net   Date Last Reviewed: 7/18/2015 2000-2017 [employer]. [address] [place] [address], [place] [address]. All rights reserved. This information is not intended as a substitute for professional medical care. Always follow your healthcare professional's instructions.

## 2017-08-15 ASSESSMENT — PATIENT HEALTH QUESTIONNAIRE - PHQ9: SUM OF ALL RESPONSES TO PHQ QUESTIONS 1-9: 11

## 2017-08-15 NOTE — PROGRESS NOTES
Progress Note    Client Name: Tamera Kumar  Date: August 3, 2017         Service Type: Individual      Session Start Time: 11:30  Session End Time: 12:23      Session Length: 53 minutes     Session #: 8     Attendees: Client attended alone    DSM-V Diagnoses:   296.53 Bipolar I Disorder Current or Most Recent Episode Depressed, Severe    DA Date: 1/23/17    Treatment Plan Due: 8/25/17  PHQ-9 :   PHQ-9 SCORE 7/21/2017 8/3/2017 8/11/2017   Total Score 24 11 25      BRIGID-7 :    BRIGID-7 SCORE 7/21/2017 8/3/2017 8/11/2017   Total Score 19 6 14           DATA      Progress Since Last Session (Related to Symptoms / Goals / Homework):   Symptoms: worsening    Homework: Completed in session     Current / Ongoing Stressors and Concerns:  Tamera reports that she has been experiencing increased worry and anxieties. She discussed that she continues to get out during the day, however it is still difficult for her to want to. Tamera also discussed her difficulties accepting new things.     Treatment Objective(s) Addressed in This Session:   Objective 1A (Client Action)                  Client will learn IMR and CBT skills to use for reducing depressed mood.     Intervention:   Provided psycho-education utilizing CBT, NATs and cognitive restructuring.      Response to Interventions:   aTmera stated she understood this information and that she would use it at home.      ASSESSMENT: Current Emotional / Mental Status (status of significant symptoms):   Risk status (Self / Other harm or suicidal ideation)   Client denies current fears or concerns for personal safety.   Client denies current or recent suicidal ideation.   Client denies current or recent homicidal ideation or behaviors.   Client denies current or recent self injurious behavior or ideation.   Client denies other safety concerns.   A safety and risk management plan has not been developed at this time, however client was given the  after-hours number / 911 should there be a change in any of these risk factors.     Appearance:   Appropriate    Eye Contact:   Fair    Psychomotor Behavior: Restless    Attitude:   Cooperative    Orientation:   All   Speech    Rate / Production: Normal     Volume:  Normal    Mood:    Anxious  Depressed    Affect:    Worrisome    Thought Content:  Rumination    Thought Form:  Coherent  Circumstantial   Insight:    Poor         Medication Compliance:   Yes     Changes in Health Issues:   None reported     Chemical Use Review:   Substance Use: Chemical use reviewed, no active concerns identified      Tobacco Use: No current tobacco use.       Collateral Reports Completed:   Not Applicable    PLAN: (Client Tasks / Therapist Tasks / Other)  Tamera was asked to practice the skills at home and to return for follow-up appointments.    Fouzia Mason, CAROLINESW

## 2017-08-16 ASSESSMENT — ANXIETY QUESTIONNAIRES: GAD7 TOTAL SCORE: 6

## 2017-08-17 ENCOUNTER — OFFICE VISIT (OUTPATIENT)
Dept: PSYCHOLOGY | Facility: OTHER | Age: 77
End: 2017-08-17
Attending: SOCIAL WORKER
Payer: COMMERCIAL

## 2017-08-17 DIAGNOSIS — F31.30 BIPOLAR I DISORDER, MOST RECENT EPISODE DEPRESSED (H): Primary | ICD-10-CM

## 2017-08-17 PROCEDURE — 90837 PSYTX W PT 60 MINUTES: CPT | Performed by: SOCIAL WORKER

## 2017-08-17 ASSESSMENT — PATIENT HEALTH QUESTIONNAIRE - PHQ9: 5. POOR APPETITE OR OVEREATING: SEVERAL DAYS

## 2017-08-17 ASSESSMENT — ANXIETY QUESTIONNAIRES
IF YOU CHECKED OFF ANY PROBLEMS ON THIS QUESTIONNAIRE, HOW DIFFICULT HAVE THESE PROBLEMS MADE IT FOR YOU TO DO YOUR WORK, TAKE CARE OF THINGS AT HOME, OR GET ALONG WITH OTHER PEOPLE: VERY DIFFICULT
2. NOT BEING ABLE TO STOP OR CONTROL WORRYING: MORE THAN HALF THE DAYS
GAD7 TOTAL SCORE: 9
3. WORRYING TOO MUCH ABOUT DIFFERENT THINGS: MORE THAN HALF THE DAYS
5. BEING SO RESTLESS THAT IT IS HARD TO SIT STILL: SEVERAL DAYS
6. BECOMING EASILY ANNOYED OR IRRITABLE: SEVERAL DAYS
1. FEELING NERVOUS, ANXIOUS, OR ON EDGE: MORE THAN HALF THE DAYS
7. FEELING AFRAID AS IF SOMETHING AWFUL MIGHT HAPPEN: NOT AT ALL

## 2017-08-17 NOTE — MR AVS SNAPSHOT
After Visit Summary   8/17/2017    Tamera Kumar    MRN: [id number]           Patient Information     Date Of Birth          1940        Visit Information        Provider Department      [facility]        Today's Diagnoses     Bipolar I disorder, most recent episode depressed (H)    -  1       Follow-ups after your visit        Your next 10 appointments already scheduled     Sep 15, 2017  1:45 PM CDT   (Arrive by 1:30 PM)   Return Visit with IAN Redd [facility] ([facility] )    [address] [place], [place] [address] [place] [address]   [phone]            Sep 18, 2017 10:00 AM CDT   (Arrive by 9:45 AM)   [facility]    [address] [place], [place] [address] [place] [address]   [phone]            Oct 02, 2017 10:00 AM CDT   (Arrive by 9:45 AM)   [facility]    [address] [place], [place] [address] [place] [address]   [phone]            Oct 16, 2017 10:00 AM CDT   (Arrive by 9:45 AM)   Return Visit with Fouzia Mason [facility] ([facility] )    [address] [place], [place] [address] [place] [address]   [phone]              Who to contact     If you have questions or need follow up information about today's clinic visit or your schedule please contact [facility] directly at [phone].  Normal or non-critical lab and imaging results will be communicated to you by MyChart, letter or phone within 4 business days after the clinic has received the results. If you do not hear from us within 7 days, please contact the clinic through MyChart or phone. If you have a critical or abnormal lab result, we will notify you by phone as soon as possible.  Submit refill requests through [employer]  "or call your pharmacy and they will forward the refill request to us. Please allow 3 business days for your refill to be completed.          Additional Information About Your Visit        MyChart Information     [employer] lets you send messages to your doctor, view your test results, renew your prescriptions, schedule appointments and more. To sign up, go to [email or web address] . Click on \"Log in\" on the left side of the screen, which will take you to the Welcome page. Then click on \"Sign up Now\" on the right side of the page.     You will be asked to enter the access code listed below, as well as some personal information. Please follow the directions to create your username and password.     Your access code is: [email or web address]  Expires: 2017  7:02 AM     Your access code will  in 90 days. If you need help or a new code, please call your [place] clinic or [phone].        Care EveryWhere ID     This is your Care EveryWhere ID. This could be used by other organizations to access your [facility] medical records  [id number]         Blood Pressure from Last 3 Encounters:   17 158/68   17 136/68   17 138/76    Weight from Last 3 Encounters:   17 108 lb (49 kg)   17 102 lb (46.3 kg)   17 98 lb (44.5 kg)              Today, you had the following     No orders found for display       Primary Care Provider Office Phone # Fax #    Keith Lemons [phone]       [facility] [address] [facility] [address]        Equal Access to Services     [facility]: Hadii michael Mckenzie, waaxda luqadaha, qaybashtyn beltranalmelania alves. So [facility] [phone].    ATENCIÓN: Si habla español, tiene a pittman disposición servicios gratuitos de asistencia lingüística. Llame al [phone].    We comply with applicable federal civil rights laws and [place] laws. We do not discriminate on the basis of race, color, " national origin, age, disability sex, sexual orientation or gender identity.            Thank you!     Thank you for choosing [facility]  for your care. Our goal is always to provide you with excellent care. Hearing back from our patients is one way we can continue to improve our services. Please take a few minutes to complete the written survey that you may receive in the mail after your visit with us. Thank you!             Your Updated Medication List - Protect others around you: Learn how to safely use, store and throw away your medicines at www.disposemymeds.org.          This list is accurate as of: 8/17/17 11:59 PM.  Always use your most recent med list.                   Brand Name Dispense Instructions for use Diagnosis    alum & mag hydroxide-simethicone 200-200-20 MG/5ML Susp suspension    MYLANTA/MAALOX     Take 30 mLs by mouth every 4 hours as needed for indigestion        amLODIPine 5 MG tablet    NORVASC    30 tablet    Take 1 tablet (5 mg) by mouth daily    Essential hypertension       benztropine 0.5 MG tablet    COGENTIN    60 tablet    Take 1 tablet (0.5 mg) by mouth 2 times daily    Akathisia       buPROPion 300 MG 24 hr tablet    WELLBUTRIN XL    30 tablet    Take 1 tablet (300 mg) by mouth every morning    Severe episode of recurrent major depressive disorder, without psychotic features (H)       cholecalciferol 5000 UNITS Caps     30 capsule    Take 1 capsule (5,000 Units) by mouth daily    Bipolar 1 disorder (H)       CLONIDINE HCL PO      Take 0.1 mg by mouth 2 times daily        donepezil 10 MG tablet    ARICEPT    30 tablet    Take 1 tablet (10 mg) by mouth At Bedtime    Vascular dementia with behavior disturbance       DULoxetine 60 MG EC capsule    CYMBALTA    30 capsule    Take 1 capsule (60 mg) by mouth daily    Recurrent major depressive disorder, in partial remission (H)       * GABAPENTIN PO      Take 50 mg by mouth as needed Also takes every 2hrs prn for anxeity 50ml         * gabapentin 100 MG capsule    NEURONTIN    90 capsule    Take 1 capsule (100 mg) by mouth 3 times daily    Anxiety       levothyroxine 75 MCG tablet    SYNTHROID/LEVOTHROID    30 tablet    Take 1 tablet (75 mcg) by mouth daily    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 substances       memantine 10 MG tablet    NAMENDA    30 tablet    Take 1 tablet (10 mg) by mouth daily    Vascular dementia with behavior disturbance       multivitamin, therapeutic with minerals Tabs tablet     30 each    Take 1 tablet by mouth daily    Bipolar 1 disorder (H)       PRILOSEC PO      Take 20 mg by mouth every morning        Sennosides 25 MG Tabs    SENNA MAXIMUM STRENGTH    30 tablet    Take 1 tablet by mouth daily    Slow transit constipation       traZODone 100 MG tablet    DESYREL    30 tablet    Take 1 tablet (100 mg) by mouth At Bedtime    Recurrent major depressive disorder, in partial remission (H)       * Notice:  This list has 2 medication(s) that are the same as other medications prescribed for you. Read the directions carefully, and ask your doctor or other care provider to review them with you.

## 2017-08-31 ASSESSMENT — PATIENT HEALTH QUESTIONNAIRE - PHQ9: SUM OF ALL RESPONSES TO PHQ QUESTIONS 1-9: 12

## 2017-08-31 NOTE — PROGRESS NOTES
Progress Note    Client Name: Tamera Kumar  Date: August 17, 2017         Service Type: Individual      Session Start Time: 9:00  Session End Time: 9:54      Session Length: 54 minutes     Session #: 9     Attendees: Client attended alone    DSM-V Diagnoses:   296.53 Bipolar I Disorder Current or Most Recent Episode Depressed, Severe    DA Date: 1/23/17    Treatment Plan Due: 8/25/17  PHQ-9 :   PHQ-9 SCORE 8/3/2017 8/11/2017 8/17/2017   Total Score 11 25 12      BRIGID-7 :    BRIGID-7 SCORE 8/3/2017 8/11/2017 8/17/2017   Total Score 6 14 9           DATA      Progress Since Last Session (Related to Symptoms / Goals / Homework):   Symptoms: improving    Homework: Completed in session     Current / Ongoing Stressors and Concerns:  Tamera reports that most days she has been feeling good. She reports continuing to get out of [place] more and socializing. Tamera does continue to discuss maladaptive thought processes and how this relates to her feelings of depression.     Treatment Objective(s) Addressed in This Session:   Objective 1A (Client Action)                  Client will learn IMR and CBT skills to use for reducing depressed mood.     Intervention:   Provided psycho-education utilizing CBT, NATs and cognitive restructuring. Worked with Tamera to see how theses skills can be used daily.     Response to Interventions:   Tamera stated she understood this information and that she would use it at home.      ASSESSMENT: Current Emotional / Mental Status (status of significant symptoms):   Risk status (Self / Other harm or suicidal ideation)   Client denies current fears or concerns for personal safety.   Client denies current or recent suicidal ideation.   Client denies current or recent homicidal ideation or behaviors.   Client denies current or recent self injurious behavior or ideation.   Client denies other safety concerns.   A safety and risk management plan has not been  developed at this time, however client was given the after-hours number / 911 should there be a change in any of these risk factors.     Appearance:   Appropriate    Eye Contact:   Fair    Psychomotor Behavior: Restless    Attitude:   Cooperative    Orientation:   All   Speech    Rate / Production: Normal     Volume:  Normal    Mood:    Anxious  Depressed    Affect:    Worrisome    Thought Content:  Rumination    Thought Form:  Coherent  Circumstantial   Insight:    Poor         Medication Compliance:   Yes     Changes in Health Issues:   None reported     Chemical Use Review:   Substance Use: Chemical use reviewed, no active concerns identified      Tobacco Use: No current tobacco use.       Collateral Reports Completed:   Not Applicable    PLAN: (Client Tasks / Therapist Tasks / Other)  Tamera was asked to practice the skills at home and to return for follow-up appointments.    Fouzia Mason, CAROLINESW

## 2017-09-01 ASSESSMENT — ANXIETY QUESTIONNAIRES: GAD7 TOTAL SCORE: 9

## 2017-09-09 DIAGNOSIS — R13.12 OROPHARYNGEAL DYSPHAGIA: Primary | ICD-10-CM

## 2017-09-11 DIAGNOSIS — F41.9 ANXIETY: ICD-10-CM

## 2017-09-12 ENCOUNTER — HOSPITAL ENCOUNTER (OUTPATIENT)
Dept: GENERAL RADIOLOGY | Facility: HOSPITAL | Age: 77
End: 2017-09-12
Attending: FAMILY MEDICINE
Payer: COMMERCIAL

## 2017-09-12 ENCOUNTER — RESULTS ONLY (OUTPATIENT)
Dept: ADMINISTRATIVE | Facility: CLINIC | Age: 77
End: 2017-09-12

## 2017-09-12 NOTE — TELEPHONE ENCOUNTER
Gabapentin  Last visit: 8.11.17  Last refill: 5.26.17 #90 /3 refills                                           Next 5 appointments (look out 90 days)     Sep 15, 2017  1:45 PM CDT   (Arrive by 1:30 PM)   Return Visit with IAN Redd [facility] [place] [facility]    [address]  [place] [address]   [phone]            Sep 18, 2017 10:00 AM CDT   (Arrive by 9:45 AM)   Return Visit with Fouzia Mason [facility] ([facility] )    [address] [place] [address]  [place] [address]   [phone]            Oct 02, 2017 10:00 AM CDT   (Arrive by 9:45 AM)   Return Visit with Fouzia Mason [facility] [place] [facility] ([facility] )    [address]  [place] [address]   [phone]            Oct 16, 2017 10:00 AM CDT   (Arrive by 9:45 AM)   Return Visit with Fouzia Mason [facility] [place] [facility]    [address]  [place] [address]   [phone]

## 2017-09-12 NOTE — PROGRESS NOTES
Fluoro time for this case was 1 minutes:41 seconds, less than 5 min  Was patient held? NO   Technologist NAME mary/dee

## 2017-09-15 ENCOUNTER — OFFICE VISIT (OUTPATIENT)
Dept: PSYCHIATRY | Facility: OTHER | Age: 77
End: 2017-09-15
Attending: NURSE PRACTITIONER
Payer: COMMERCIAL

## 2017-09-15 VITALS
TEMPERATURE: 97.5 F | HEIGHT: 60 IN | HEART RATE: 60 BPM | DIASTOLIC BLOOD PRESSURE: 60 MMHG | RESPIRATION RATE: 16 BRPM | OXYGEN SATURATION: 96 % | WEIGHT: 114 LBS | BODY MASS INDEX: 22.38 KG/M2 | SYSTOLIC BLOOD PRESSURE: 116 MMHG

## 2017-09-15 DIAGNOSIS — F33.2 SEVERE EPISODE OF RECURRENT MAJOR DEPRESSIVE DISORDER, WITHOUT PSYCHOTIC FEATURES (H): ICD-10-CM

## 2017-09-15 DIAGNOSIS — F33.41 RECURRENT MAJOR DEPRESSIVE DISORDER, IN PARTIAL REMISSION (H): Primary | ICD-10-CM

## 2017-09-15 PROCEDURE — 99214 OFFICE O/P EST MOD 30 MIN: CPT | Performed by: NURSE PRACTITIONER

## 2017-09-15 PROCEDURE — 99212 OFFICE O/P EST SF 10 MIN: CPT

## 2017-09-15 RX ORDER — DULOXETIN HYDROCHLORIDE 30 MG/1
CAPSULE, DELAYED RELEASE ORAL
Qty: 90 CAPSULE | Refills: 3 | Status: SHIPPED | OUTPATIENT
Start: 2017-09-15 | End: 2018-01-01

## 2017-09-15 RX ORDER — AMOXICILLIN 250 MG
1 CAPSULE ORAL DAILY
COMMUNITY
End: 2018-01-01

## 2017-09-15 RX ORDER — BUPROPION HYDROCHLORIDE 300 MG/1
300 TABLET ORAL EVERY MORNING
Qty: 30 TABLET | Refills: 3 | Status: SHIPPED | OUTPATIENT
Start: 2017-09-15 | End: 2017-01-01

## 2017-09-15 ASSESSMENT — ANXIETY QUESTIONNAIRES
1. FEELING NERVOUS, ANXIOUS, OR ON EDGE: NEARLY EVERY DAY
2. NOT BEING ABLE TO STOP OR CONTROL WORRYING: NEARLY EVERY DAY
7. FEELING AFRAID AS IF SOMETHING AWFUL MIGHT HAPPEN: SEVERAL DAYS
3. WORRYING TOO MUCH ABOUT DIFFERENT THINGS: NEARLY EVERY DAY
GAD7 TOTAL SCORE: 15
4. TROUBLE RELAXING: NEARLY EVERY DAY
6. BECOMING EASILY ANNOYED OR IRRITABLE: SEVERAL DAYS
IF YOU CHECKED OFF ANY PROBLEMS ON THIS QUESTIONNAIRE, HOW DIFFICULT HAVE THESE PROBLEMS MADE IT FOR YOU TO DO YOUR WORK, TAKE CARE OF THINGS AT HOME, OR GET ALONG WITH OTHER PEOPLE: VERY DIFFICULT
5. BEING SO RESTLESS THAT IT IS HARD TO SIT STILL: SEVERAL DAYS

## 2017-09-15 ASSESSMENT — PATIENT HEALTH QUESTIONNAIRE - PHQ9: SUM OF ALL RESPONSES TO PHQ QUESTIONS 1-9: 22

## 2017-09-15 ASSESSMENT — PAIN SCALES - GENERAL: PAINLEVEL: NO PAIN (0)

## 2017-09-15 NOTE — MR AVS SNAPSHOT
After Visit Summary   9/15/2017    Tamera Kumar    MRN: [id number]           Patient Information     Date Of Birth          1940        Visit Information        Provider Department      9/15/2017 1:45 PM Naomi Cook APRN [facility]        Today's Diagnoses     Recurrent major depressive disorder, in partial remission (H)    -  1    Severe episode of recurrent major depressive disorder, without psychotic features (H)          Care Instructions    1. Recurrent major depressive disorder, in partial remission (H)    - DULoxetine (CYMBALTA) 30 MG EC capsule; Take 1 capsule daily  Dispense: 90 capsule; Refill: 3  - sertraline (ZOLOFT) 50 MG tablet; Start on 25 mg daily for 14 days then increase to 50 mg  Dispense: 90 tablet; Refill: 1    2. Severe episode of recurrent major depressive disorder, without psychotic features (H)  - buPROPion (WELLBUTRIN XL) 300 MG 24 hr tablet; Take 1 tablet (300 mg) by mouth every morning  Dispense: 30 tablet; Refill: 3            Depression Affects Your Mind and Body  Everyone feels sad or  blue  from time to time for a few days or weeks. Depression is when these feelings don't go away and they interfere with daily life.  Depression is a real illness that can develop at any age. It is one of the most common mental health problems in the [place] Depression makes you feel sad, helpless, and hopeless. It gets in the way of your life and relationships. It inhibits your ability to think and act. But, with help, you can feel better again.      When I was depressed, I felt awful. I was so tired all the time I could hardly think, but at night I couldn t fall asleep. My head hurt. My stomach hurt. I didn t know what was wrong with me.    Depression affects your whole body  Brain chemicals affect your body as well as your mood. So depression may do more than just make you feel low. You may also feel bad physically. Depression can:    Cause trouble with mental  tasks such as remembering, concentrating, or making decisions    Make you feel nervous and jumpy    Cause trouble sleeping. Or you may sleep too much    Change your appetite    Cause headaches, stomachaches, or other aches and pains    Drain your body of energy  Depression and other illness  It is common for people who have chronic health problems to also have depression. It can often be hard to tell which one caused the other. A person might become depressed after finding out they have a health problem. But some studies suggest being depressed may make certain health problems more likely. And some depressed people stop taking care of themselves. This may make them more likely to get sick.  Date Last Reviewed: 1/1/2017 2000-2017 [employer]. [address] [place] [address], [place] [address]. All rights reserved. This information is not intended as a substitute for professional medical care. Always follow your healthcare professional's instructions.                Follow-ups after your visit        Follow-up notes from your care team     Return in about 6 weeks (around 10/27/2017).      Your next 10 appointments already scheduled     Sep 18, 2017 10:00 AM CDT   (Arrive by 9:45 AM)   Return Visit with Fouzia Mason [facility] ([facility] )    [address] [place], [place] [address] [address]   [phone]            Oct 02, 2017 10:00 AM CDT   (Arrive by 9:45 AM)   Return Visit with Fouzia Mason [facility]    [address] [place] [address]  [place] [address]   [phone]            Oct 16, 2017 10:00 AM CDT   (Arrive by 9:45 AM)   Return Visit with Fouzia Mason [facility]    [address] [place], [place] [address] [address]   [phone]            Nov 10, 2017  3:30 PM CST   (Arrive by 3:15 PM)   Return Visit with IAN Redd [facility]  "[facility] [place] ([facility] [place] )    [address] [place] [address]  [place] [address] [phone]              Who to contact     If you have questions or need follow up information about today's clinic visit or your schedule please contact [facility] directly at [phone].  Normal or non-critical lab and imaging results will be communicated to you by MyChart, letter or phone within 4 business days after the clinic has received the results. If you do not hear from us within 7 days, please contact the clinic through MyChart or phone. If you have a critical or abnormal lab result, we will notify you by phone as soon as possible.  Submit refill requests through [employer] or call your pharmacy and they will forward the refill request to us. Please allow 3 business days for your refill to be completed.          Additional Information About Your Visit        MyChart Information     [employer] lets you send messages to your doctor, view your test results, renew your prescriptions, schedule appointments and more. To sign up, go to [email or web address] . Click on \"Log in\" on the left side of the screen, which will take you to the Welcome page. Then click on \"Sign up Now\" on the right side of the page.     You will be asked to enter the access code listed below, as well as some personal information. Please follow the directions to create your username and password.     Your access code is: [email or web address]  Expires: 2017  7:02 AM     Your access code will  in 90 days. If you need help or a new code, please call your [place] clinic or [phone].        Care EveryWhere ID     This is your Care EveryWhere ID. This could be used by other organizations to access your [place] medical records  [id number]        Your Vitals Were     Pulse Temperature Respirations Height Pulse Oximetry BMI (Body Mass Index)    60 97.5  F (36.4  C) (Tympanic) 16 5' (1.524 m) 96% 22.26 kg/m2       Blood " Pressure from Last 3 Encounters:   09/15/17 116/60   08/11/17 158/68   07/21/17 136/68    Weight from Last 3 Encounters:   09/15/17 114 lb (51.7 kg)   08/11/17 108 lb (49 kg)   07/21/17 102 lb (46.3 kg)              Today, you had the following     No orders found for display         Today's Medication Changes          These changes are accurate as of: 9/15/17  3:34 PM.  If you have any questions, ask your nurse or doctor.               Start taking these medicines.        Dose/Directions    sertraline 50 MG tablet   Commonly known as:  ZOLOFT   Used for:  Recurrent major depressive disorder, in partial remission (H)   Started by:  Naomi Cook APRN CNP        Start on 25 mg daily for 14 days then increase to 50 mg   Quantity:  90 tablet   Refills:  1         These medicines have changed or have updated prescriptions.        Dose/Directions    DULoxetine 30 MG EC capsule   Commonly known as:  CYMBALTA   This may have changed:    - medication strength  - how much to take  - how to take this  - when to take this  - additional instructions   Used for:  Recurrent major depressive disorder, in partial remission (H)   Changed by:  Naomi Cook APRN CNP        Take 1 capsule daily   Quantity:  90 capsule   Refills:  3            Where to get your medicines      These medications were sent to [address] [address]     Phone:  [phone]     buPROPion 300 MG 24 hr tablet    DULoxetine 30 MG EC capsule    sertraline 50 MG tablet                Primary Care Provider Office Phone # Fax #    Keith Lemons [phone]       [place] [address] [place] [address]        Equal Access to Services     [facility] : Ekaternia Mckenzie, jose garzon, melania ingram. So [facility] [phone].    ATENCIÓN: Si habla español, tiene a pittman disposición servicios gratuitos de  asistencia lingüística. Hardy al [phone].    We comply with applicable federal civil rights laws and [place] laws. We do not discriminate on the basis of race, color, national origin, age, disability sex, sexual orientation or gender identity.            Thank you!     Thank you for choosing [facility]  for your care. Our goal is always to provide you with excellent care. Hearing back from our patients is one way we can continue to improve our services. Please take a few minutes to complete the written survey that you may receive in the mail after your visit with us. Thank you!             Your Updated Medication List - Protect others around you: Learn how to safely use, store and throw away your medicines at www.disposemymeds.org.          This list is accurate as of: 9/15/17  3:34 PM.  Always use your most recent med list.                   Brand Name Dispense Instructions for use Diagnosis    ACETAMINOPHEN PO      Take 325 mg by mouth every 6 hours as needed for pain        amLODIPine 5 MG tablet    NORVASC    30 tablet    Take 1 tablet (5 mg) by mouth daily    Essential hypertension       benztropine 0.5 MG tablet    COGENTIN    60 tablet    Take 1 tablet (0.5 mg) by mouth 2 times daily    Akathisia       buPROPion 300 MG 24 hr tablet    WELLBUTRIN XL    30 tablet    Take 1 tablet (300 mg) by mouth every morning    Severe episode of recurrent major depressive disorder, without psychotic features (H)       cholecalciferol 5000 UNITS Caps     30 capsule    Take 1 capsule (5,000 Units) by mouth daily    Bipolar 1 disorder (H)       CLONIDINE HCL PO      Take 0.1 mg by mouth 2 times daily        donepezil 10 MG tablet    ARICEPT    30 tablet    Take 1 tablet (10 mg) by mouth At Bedtime    Vascular dementia with behavior disturbance       DULoxetine 30 MG EC capsule    CYMBALTA    90 capsule    Take 1 capsule daily    Recurrent major depressive disorder, in partial remission (H)       gabapentin  100 MG capsule    NEURONTIN    90 capsule    Take 1 capsule (100 mg) by mouth 3 times daily    Anxiety       levothyroxine 75 MCG tablet    SYNTHROID/LEVOTHROID    30 tablet    Take 1 tablet (75 mcg) by mouth daily    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 substances       memantine 10 MG tablet    NAMENDA    30 tablet    Take 1 tablet (10 mg) by mouth daily    Vascular dementia with behavior disturbance       multivitamin, therapeutic with minerals Tabs tablet     30 each    Take 1 tablet by mouth daily    Bipolar 1 disorder (H)       PRILOSEC PO      Take 20 mg by mouth every morning        senna-docusate 8.6-50 MG per tablet    SENOKOT-S;PERICOLACE     Take 1 tablet by mouth daily        sertraline 50 MG tablet    ZOLOFT    90 tablet    Start on 25 mg daily for 14 days then increase to 50 mg    Recurrent major depressive disorder, in partial remission (H)       traZODone 100 MG tablet    DESYREL    30 tablet    Take 1 tablet (100 mg) by mouth At Bedtime    Recurrent major depressive disorder, in partial remission (H)

## 2017-09-15 NOTE — PATIENT INSTRUCTIONS
1. Recurrent major depressive disorder, in partial remission (H)    - DULoxetine (CYMBALTA) 30 MG EC capsule; Take 1 capsule daily  Dispense: 90 capsule; Refill: 3  - sertraline (ZOLOFT) 50 MG tablet; Start on 25 mg daily for 14 days then increase to 50 mg  Dispense: 90 tablet; Refill: 1    2. Severe episode of recurrent major depressive disorder, without psychotic features (H)  - buPROPion (WELLBUTRIN XL) 300 MG 24 hr tablet; Take 1 tablet (300 mg) by mouth every morning  Dispense: 30 tablet; Refill: 3            Depression Affects Your Mind and Body  Everyone feels sad or  blue  from time to time for a few days or weeks. Depression is when these feelings don't go away and they interfere with daily life.  Depression is a real illness that can develop at any age. It is one of the most common mental health problems in the [place] Depression makes you feel sad, helpless, and hopeless. It gets in the way of your life and relationships. It inhibits your ability to think and act. But, with help, you can feel better again.      When I was depressed, I felt awful. I was so tired all the time I could hardly think, but at night I couldn t fall asleep. My head hurt. My stomach hurt. I didn t know what was wrong with me.    Depression affects your whole body  Brain chemicals affect your body as well as your mood. So depression may do more than just make you feel low. You may also feel bad physically. Depression can:    Cause trouble with mental tasks such as remembering, concentrating, or making decisions    Make you feel nervous and jumpy    Cause trouble sleeping. Or you may sleep too much    Change your appetite    Cause headaches, stomachaches, or other aches and pains    Drain your body of energy  Depression and other illness  It is common for people who have chronic health problems to also have depression. It can often be hard to tell which one caused the other. A person might become depressed after finding out they have a  health problem. But some studies suggest being depressed may make certain health problems more likely. And some depressed people stop taking care of themselves. This may make them more likely to get sick.  Date Last Reviewed: 1/1/2017 2000-2017 [employer]. [address] [place] [address], [place] [address]. All rights reserved. This information is not intended as a substitute for professional medical care. Always follow your healthcare professional's instructions.

## 2017-09-15 NOTE — PROGRESS NOTES
"[facility]   Psychiatric Progress Note    Subjective   This is a 77 year old female with Bipolar 1 disorder, currently depressed and dementia. Tamera reports things are not going \"all that hot\" and believes she has never been the same since she returned from [place].  She is depressed every day and if she had her way she would not leave her room. She does force herself to get out and go to bin \"which is a big farce anyways\". Tamera continues to see Fouzia which she finds helpful. Discussed decreasing her cymbalta from 60 mg to 30 mg as she has not found it to be helpful. Tamera denies any feelings of amanda, she is frustrated that she \"always feels depressed\". Discussed a trial of an SSRI, she has experienced allergies to several of these medications in the past however I am unable to find any notation of her ever being on Zoloft. Discussed trying her on this and if she experiences any S/E she will stop taking it. This will also be noted on the progress note from [place].    10 point ROS negative except for what is noted in HPI       DIagnoses:   1. Recurrent major depressive disorder, in partial remission (H)    - DULoxetine (CYMBALTA) 30 MG EC capsule; Take 1 capsule daily  Dispense: 90 capsule; Refill: 3  - sertraline (ZOLOFT) 50 MG tablet; Start on 25 mg daily for 14 days then increase to 50 mg  Dispense: 90 tablet; Refill: 1    2. Severe episode of recurrent major depressive disorder, without psychotic features (H)    - buPROPion (WELLBUTRIN XL) 300 MG 24 hr tablet; Take 1 tablet (300 mg) by mouth every morning  Dispense: 30 tablet; Refill: 3      Attestation:  Patient has been seen and evaluated by me,  IAN Robles CNP          Interim History:   The patient's care was discussed with the treatment team and chart notes were reviewed.          Medications:       Current Outpatient Prescriptions:      senna-docusate (SENOKOT-S;PERICOLACE) 8.6-50 MG per tablet, Take 1 tablet by mouth daily, Disp: , " Rfl:      ACETAMINOPHEN PO, Take 325 mg by mouth every 6 hours as needed for pain, Disp: , Rfl:      DULoxetine (CYMBALTA) 30 MG EC capsule, Take 1 capsule daily, Disp: 90 capsule, Rfl: 3     buPROPion (WELLBUTRIN XL) 300 MG 24 hr tablet, Take 1 tablet (300 mg) by mouth every morning, Disp: 30 tablet, Rfl: 3     sertraline (ZOLOFT) 50 MG tablet, Start on 25 mg daily for 14 days then increase to 50 mg, Disp: 90 tablet, Rfl: 1     benztropine (COGENTIN) 0.5 MG tablet, Take 1 tablet (0.5 mg) by mouth 2 times daily, Disp: 60 tablet, Rfl: 3     donepezil (ARICEPT) 10 MG tablet, Take 1 tablet (10 mg) by mouth At Bedtime, Disp: 30 tablet, Rfl: 5     gabapentin (NEURONTIN) 100 MG capsule, Take 1 capsule (100 mg) by mouth 3 times daily, Disp: 90 capsule, Rfl: 3     CLONIDINE HCL PO, Take 0.1 mg by mouth 2 times daily, Disp: , Rfl:      levothyroxine (SYNTHROID/LEVOTHROID) 75 MCG tablet, Take 1 tablet (75 mcg) by mouth daily, Disp: 30 tablet, Rfl: 0     traZODone (DESYREL) 100 MG tablet, Take 1 tablet (100 mg) by mouth At Bedtime, Disp: 30 tablet, Rfl: 3     Omeprazole (PRILOSEC PO), Take 20 mg by mouth every morning, Disp: , Rfl:      amLODIPine (NORVASC) 5 MG tablet, Take 1 tablet (5 mg) by mouth daily, Disp: 30 tablet, Rfl: 3     memantine (NAMENDA) 10 MG tablet, Take 1 tablet (10 mg) by mouth daily, Disp: 30 tablet, Rfl: 0     multivitamin, therapeutic with minerals (THERA-VIT-M) TABS, Take 1 tablet by mouth daily, Disp: 30 each, Rfl: 0     cholecalciferol 5000 UNITS CAPS, Take 1 capsule (5,000 Units) by mouth daily, Disp: 30 capsule, Rfl: 0     [DISCONTINUED] buPROPion (WELLBUTRIN XL) 300 MG 24 hr tablet, Take 1 tablet (300 mg) by mouth every morning, Disp: 30 tablet, Rfl: 3     [DISCONTINUED] GABAPENTIN PO, Take 50 mg by mouth as needed Also takes every 2hrs prn for anxeity 50ml, Disp: , Rfl:      [DISCONTINUED] DULoxetine (CYMBALTA) 60 MG EC capsule, Take 1 capsule (60 mg) by mouth daily, Disp: 30 capsule, Rfl: 1      "       Allergies:     Allergies   Allergen Reactions     Benadryl [Diphenhydramine] Unknown     Patient states she is allergic to benadryl     Depakote      Escitalopram      Suicidal ideation     Famotidine      Fluoxetine      Suicidal ideation     Hydrocodone      Lisinopril      swelling     Lithium Swelling     Other [Seasonal Allergies]      Pet hair       Penicillins Nausea     Ramelteon      Complains of orgasm feeling     Risperdal [Risperidone] Unknown     Patient states she is allergic to risperdal       Valproic Acid Unknown     \"seizures\"            Psychiatric Examination:   /60 (BP Location: Left arm, Patient Position: Sitting, Cuff Size: Adult Regular)  Pulse 60  Temp 97.5  F (36.4  C) (Tympanic)  Resp 16  Ht 5' (1.524 m)  Wt 114 lb (51.7 kg)  SpO2 96%  BMI 22.26 kg/m2  Weight is 114 lbs 0 oz  Body mass index is 22.26 kg/(m^2).    Appearance:  awake, alert  Attitude:  cooperative  Eye Contact:  good  Mood:  depressed  Affect:  mood congruent and intensity is blunted  Speech:  clear, coherent  Psychomotor Behavior:  evidence of tardive dyskinesia  Thought Process:  logical  Associations:  no loose associations  Thought Content:  no evidence of suicidal ideation or homicidal ideation and no evidence of psychotic thought  Insight:  fair  Judgment:  fair  Oriented to:  time, person, and place  Attention Span and Concentration:  fair  Recent and Remote Memory:  fair  Fund of Knowledge: appropriate  Muscle Strength and Tone: normal  Gait and Station: Normal  Perception: no perceptual disorder noted.         Labs:   No results found for this or any previous visit (from the past 24 hour(s)).          Assessment/ Plan:    1. Recurrent major depressive disorder, in partial remission (H)     This is a decrease in Cymbalta  - DULoxetine (CYMBALTA) 30 MG EC capsule; Take 1 capsule daily  Dispense: 90 capsule; Refill: 3  - sertraline (ZOLOFT) 50 MG tablet; Start on 25 mg daily for 14 days then " increase to 50 mg  Dispense: 90 tablet; Refill: 1    2. Severe episode of recurrent major depressive disorder, without psychotic features (H)    - buPROPion (WELLBUTRIN XL) 300 MG 24 hr tablet; Take 1 tablet (300 mg) by mouth every morning  Dispense: 30 tablet; Refill: 3       For all new medications pt was instructed on the route, dose, action, potential side effects, along with the anticipated outcomes. Pt verbalized understandings.

## 2017-09-15 NOTE — NURSING NOTE
Chief Complaint   Patient presents with     Manic Behavior     fu and Akathsia       Initial /60 (BP Location: Left arm, Patient Position: Sitting, Cuff Size: Adult Regular)  Pulse 60  Temp 97.5  F (36.4  C) (Tympanic)  Resp 16  Ht 5' (1.524 m)  Wt 114 lb (51.7 kg)  SpO2 96%  BMI 22.26 kg/m2 Estimated body mass index is 22.26 kg/(m^2) as calculated from the following:    Height as of this encounter: 5' (1.524 m).    Weight as of this encounter: 114 lb (51.7 kg).  Medication Reconciliation: complete   Marina Gutierrez

## 2017-09-16 ASSESSMENT — ANXIETY QUESTIONNAIRES: GAD7 TOTAL SCORE: 15

## 2017-09-17 RX ORDER — GABAPENTIN 100 MG/1
CAPSULE ORAL
Qty: 90 CAPSULE | Refills: 0 | Status: SHIPPED | OUTPATIENT
Start: 2017-09-17 | End: 2017-10-09

## 2017-09-18 ENCOUNTER — OFFICE VISIT (OUTPATIENT)
Dept: PSYCHOLOGY | Facility: OTHER | Age: 77
End: 2017-09-18
Attending: SOCIAL WORKER
Payer: COMMERCIAL

## 2017-09-18 DIAGNOSIS — F31.30 BIPOLAR I DISORDER, MOST RECENT EPISODE DEPRESSED (H): Primary | ICD-10-CM

## 2017-09-18 PROCEDURE — 90837 PSYTX W PT 60 MINUTES: CPT | Performed by: SOCIAL WORKER

## 2017-09-18 ASSESSMENT — ANXIETY QUESTIONNAIRES
5. BEING SO RESTLESS THAT IT IS HARD TO SIT STILL: MORE THAN HALF THE DAYS
GAD7 TOTAL SCORE: 17
IF YOU CHECKED OFF ANY PROBLEMS ON THIS QUESTIONNAIRE, HOW DIFFICULT HAVE THESE PROBLEMS MADE IT FOR YOU TO DO YOUR WORK, TAKE CARE OF THINGS AT HOME, OR GET ALONG WITH OTHER PEOPLE: VERY DIFFICULT
7. FEELING AFRAID AS IF SOMETHING AWFUL MIGHT HAPPEN: NEARLY EVERY DAY
3. WORRYING TOO MUCH ABOUT DIFFERENT THINGS: NEARLY EVERY DAY
6. BECOMING EASILY ANNOYED OR IRRITABLE: MORE THAN HALF THE DAYS
1. FEELING NERVOUS, ANXIOUS, OR ON EDGE: NEARLY EVERY DAY
2. NOT BEING ABLE TO STOP OR CONTROL WORRYING: NEARLY EVERY DAY

## 2017-09-18 ASSESSMENT — PATIENT HEALTH QUESTIONNAIRE - PHQ9: 5. POOR APPETITE OR OVEREATING: SEVERAL DAYS

## 2017-09-18 NOTE — MR AVS SNAPSHOT
After Visit Summary   9/18/2017    Tamera Kumar    MRN: [id number]           Patient Information     Date Of Birth          1940        Visit Information        Provider Department      [facility]2017 [facility]        Today's Diagnoses     Bipolar I disorder, most recent episode depressed (H)    -  1       Follow-ups after your visit        Your next 10 appointments already scheduled     Oct 02, 2017 10:00 AM CDT   (Arrive by 9:45 AM)   [facility] )    [address] [place] [address]   [phone]            Oct 16, 2017 10:00 AM CDT   (Arrive by 9:45 AM)   Return Visit with Fouzia Mason [facility] ([facility] )    [address] [place] [address]   [phone]            Oct 30, 2017 10:00 AM CDT   (Arrive by 9:45 AM)   Return Visit with Fouzia Mason [facility] ([facility] )    [address] [place] [address]   [phone]            Nov 10, 2017  3:30 PM CST   (Arrive by 3:15 PM)   Return Visit with IAN Redd [facility] ([facility] )    [address] [place] [address]   [phone]              Who to contact     If you have questions or need follow up information about today's clinic visit or your schedule please contact [facility] directly at [phone].  Normal or non-critical lab and imaging results will be communicated to you by MyChart, letter or phone within 4 business days after the clinic has received the results. If you do not hear from us within 7 days, please contact the clinic through MyChart or phone. If you have a critical or abnormal lab result, we will notify you by phone as soon as possible.  Submit refill requests through [employer]  "or call your pharmacy and they will forward the refill request to us. Please allow 3 business days for your refill to be completed.          Additional Information About Your Visit        MyChart Information     [employer] lets you send messages to your doctor, view your test results, renew your prescriptions, schedule appointments and more. To sign up, go to [email or web address] . Click on \"Log in\" on the left side of the screen, which will take you to the Welcome page. Then click on \"Sign up Now\" on the right side of the page.     You will be asked to enter the access code listed below, as well as some personal information. Please follow the directions to create your username and password.     Your access code is: [email or web address]  Expires: 2017  7:02 AM     Your access code will  in 90 days. If you need help or a new code, please call your [place] clinic or [phone].        Care EveryWhere ID     This is your Care EveryWhere ID. This could be used by other organizations to access your [place] medical records  [id number]         Blood Pressure from Last 3 Encounters:   09/15/17 116/60   17 158/68   17 136/68    Weight from Last 3 Encounters:   09/15/17 114 lb (51.7 kg)   17 108 lb (49 kg)   17 102 lb (46.3 kg)              Today, you had the following     No orders found for display       Primary Care Provider Office Phone # Fax #    Keith Lemons [phone]       [facility] [address] [facility] [address]        Equal Access to Services     [facility] Center[facility]AU : Hadbertha Mckenzie, jose garzon, melania ingram. So [facility] [phone].    ATENCIÓN: Si habla español, tiene a pittman disposición servicios gratuitos de asistencia lingüística. Hardy al [phone].    We comply with applicable federal civil rights laws and [place] laws. We do not discriminate on the basis of race, color, " national origin, age, disability sex, sexual orientation or gender identity.            Thank you!     Thank you for choosing [facility]  for your care. Our goal is always to provide you with excellent care. Hearing back from our patients is one way we can continue to improve our services. Please take a few minutes to complete the written survey that you may receive in the mail after your visit with us. Thank you!             Your Updated Medication List - Protect others around you: Learn how to safely use, store and throw away your medicines at www.disposemymeds.org.          This list is accurate as of: 9/18/17 11:59 PM.  Always use your most recent med list.                   Brand Name Dispense Instructions for use Diagnosis    ACETAMINOPHEN PO      Take 325 mg by mouth every 6 hours as needed for pain        amLODIPine 5 MG tablet    NORVASC    30 tablet    Take 1 tablet (5 mg) by mouth daily    Essential hypertension       benztropine 0.5 MG tablet    COGENTIN    60 tablet    Take 1 tablet (0.5 mg) by mouth 2 times daily    Akathisia       buPROPion 300 MG 24 hr tablet    WELLBUTRIN XL    30 tablet    Take 1 tablet (300 mg) by mouth every morning    Severe episode of recurrent major depressive disorder, without psychotic features (H)       cholecalciferol 5000 UNITS Caps     30 capsule    Take 1 capsule (5,000 Units) by mouth daily    Bipolar 1 disorder (H)       CLONIDINE HCL PO      Take 0.1 mg by mouth 2 times daily        donepezil 10 MG tablet    ARICEPT    30 tablet    Take 1 tablet (10 mg) by mouth At Bedtime    Vascular dementia with behavior disturbance       DULoxetine 30 MG EC capsule    CYMBALTA    90 capsule    Take 1 capsule daily    Recurrent major depressive disorder, in partial remission (H)       gabapentin 100 MG capsule    NEURONTIN    90 capsule    Take 1 capsule (100 mg) by mouth 3 times daily    Anxiety       levothyroxine 75 MCG tablet    SYNTHROID/LEVOTHROID    30 tablet     Take 1 tablet (75 mcg) by mouth daily    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 substances       memantine 10 MG tablet    NAMENDA    30 tablet    Take 1 tablet (10 mg) by mouth daily    Vascular dementia with behavior disturbance       multivitamin, therapeutic with minerals Tabs tablet     30 each    Take 1 tablet by mouth daily    Bipolar 1 disorder (H)       PRILOSEC PO      Take 20 mg by mouth every morning        senna-docusate 8.6-50 MG per tablet    SENOKOT-S;PERICOLACE     Take 1 tablet by mouth daily        sertraline 50 MG tablet    ZOLOFT    90 tablet    Start on 25 mg daily for 14 days then increase to 50 mg    Recurrent major depressive disorder, in partial remission (H)       traZODone 100 MG tablet    DESYREL    30 tablet    Take 1 tablet (100 mg) by mouth At Bedtime    Recurrent major depressive disorder, in partial remission (H)

## 2017-09-27 NOTE — PROGRESS NOTES
Progress Note    Client Name: Tamera Kumar  Date: September 18, 2017         Service Type: Individual      Session Start Time: 9:52  Session End Time: 10:50      Session Length: 58 minutes     Session #: 10     Attendees: Client attended alone    DSM-V Diagnoses:   296.53 Bipolar I Disorder Current or Most Recent Episode Depressed, Severe    DA Date: 1/23/17    Treatment Plan Due: 8/25/17  PHQ-9 :   PHQ-9 SCORE 8/11/2017 8/17/2017 9/15/2017   Total Score 25 12 22      BRIGID-7 :    BRIGID-7 SCORE 8/17/2017 9/15/2017 9/18/2017   Total Score 9 15 17           DATA      Progress Since Last Session (Related to Symptoms / Goals / Homework):   Symptoms: worsening    Homework: Completed in session     Current / Ongoing Stressors and Concerns:  Tamera appears despondent today, she is reporting an increase in depressive symptoms. She stated that she thinks about canceling all of her appointments. She also reports not being able to pray anymore due to inability to concentrate. Tamera stated that she has no interest in activities or motivation.     Treatment Objective(s) Addressed in This Session:   Objective 1A (Client Action)                  Client will learn IMR and CBT skills to use for reducing depressed mood.     Intervention:   Provided psycho-education utilizing CBT, NATs and cognitive restructuring. Again, educated on behavioral activation.      Response to Interventions:   Tamera stated she understood this information and that she would use it at home.      ASSESSMENT: Current Emotional / Mental Status (status of significant symptoms):   Risk status (Self / Other harm or suicidal ideation)   Client denies current fears or concerns for personal safety.   Client denies current or recent suicidal ideation.   Client denies current or recent homicidal ideation or behaviors.   Client denies current or recent self injurious behavior or ideation.   Client denies other safety  concerns.   A safety and risk management plan has not been developed at this time, however client was given the after-hours number / 911 should there be a change in any of these risk factors.     Appearance:   Appropriate    Eye Contact:   Fair    Psychomotor Behavior: Restless    Attitude:   Cooperative    Orientation:   All   Speech    Rate / Production: Normal     Volume:  Normal    Mood:    Anxious  Depressed    Affect:    Flat  Worrisome    Thought Content:  Rumination    Thought Form:  Coherent  Circumstantial   Insight:    Poor         Medication Compliance:   Yes     Changes in Health Issues:   None reported     Chemical Use Review:   Substance Use: Chemical use reviewed, no active concerns identified      Tobacco Use: No current tobacco use.       Collateral Reports Completed:   Not Applicable    PLAN: (Client Tasks / Therapist Tasks / Other)  Tamera was asked to practice the skills at home and to return for follow-up appointments.    CAROLINE SandersSW

## 2017-09-28 ASSESSMENT — ANXIETY QUESTIONNAIRES: GAD7 TOTAL SCORE: 17

## 2017-10-09 DIAGNOSIS — F41.9 ANXIETY: ICD-10-CM

## 2017-10-10 NOTE — TELEPHONE ENCOUNTER
Gabapentin      Last Written Prescription Date: 9/17/17  Last Quantity: 90, # refills: 0  Last Office Visit with [facility] or [facility] prescribing provider: 9/15/17  Next 5 appointments (look out 90 days)     Oct 16, 2017 10:00 AM CDT   (Arrive by 9:45 AM)   Return Visit with F[facility]zia [facility] ([facility] )    [address] [place], [place] [address] [place] [address]   [phone]            Oct 30, 2017 10:00 AM CDT   (Arrive by 9:45 AM)   Return Visit with Fouzia Mason [facility] ([facility] )    [address] [place], [place] [address] [place] [address]   [phone]            Nov 10, 2017  3:30 PM CST   (Arrive by 3:15 PM)   Return Visit with IAN Redd [facility] ([facility] )    [address] [place] [address]   [phone]                   Creatinine   Date Value Ref Range Status   04/20/2016 1.71 (H) 0.52 - 1.04 mg/dL Final     Lab Results   Component Value Date    AST 12 03/12/2016     Lab Results   Component Value Date    ALT 20 03/12/2016     BP Readings from Last 3 Encounters:   09/15/17 116/60   08/11/17 158/68   07/21/17 136/68

## 2017-10-13 RX ORDER — GABAPENTIN 100 MG/1
CAPSULE ORAL
Qty: 90 CAPSULE | Refills: 0 | Status: SHIPPED | OUTPATIENT
Start: 2017-10-13 | End: 2018-01-01

## 2017-10-16 ENCOUNTER — OFFICE VISIT (OUTPATIENT)
Dept: PSYCHOLOGY | Facility: OTHER | Age: 77
End: 2017-10-16
Attending: SOCIAL WORKER
Payer: COMMERCIAL

## 2017-10-16 DIAGNOSIS — F31.30 BIPOLAR I DISORDER, MOST RECENT EPISODE DEPRESSED (H): Primary | ICD-10-CM

## 2017-10-16 PROCEDURE — 90837 PSYTX W PT 60 MINUTES: CPT | Performed by: SOCIAL WORKER

## 2017-10-16 ASSESSMENT — PATIENT HEALTH QUESTIONNAIRE - PHQ9: 5. POOR APPETITE OR OVEREATING: NEARLY EVERY DAY

## 2017-10-16 ASSESSMENT — ANXIETY QUESTIONNAIRES
6. BECOMING EASILY ANNOYED OR IRRITABLE: MORE THAN HALF THE DAYS
3. WORRYING TOO MUCH ABOUT DIFFERENT THINGS: NEARLY EVERY DAY
1. FEELING NERVOUS, ANXIOUS, OR ON EDGE: NEARLY EVERY DAY
2. NOT BEING ABLE TO STOP OR CONTROL WORRYING: NEARLY EVERY DAY
5. BEING SO RESTLESS THAT IT IS HARD TO SIT STILL: MORE THAN HALF THE DAYS

## 2017-10-16 NOTE — MR AVS SNAPSHOT
"              After Visit Summary   10/16/2017    Tamera Kumar    MRN: [id number]           Patient Information     Date Of Birth          1940        Visit Information        Provider Department      10/16/2017 10:00 AM Fouzia Mason, [facility]        Today's Diagnoses     Bipolar I disorder, most recent episode depressed (H)    -  1       Follow-ups after your visit        Your next 10 appointments already scheduled     Nov 15, 2017 10:00 AM CST   (Arrive by 9:45 AM)   [facility]    [address] [place] [address] [phone]            Nov 17, 2017  3:30 PM CST   (Arrive by 3:15 PM)   Return Visit with IAN Redd [facility] ([facility] )    [address] [place] [address] [phone]              Who to contact     If you have questions or need follow up information about today's clinic visit or your schedule please contact [facility] directly at [phone].  Normal or non-critical lab and imaging results will be communicated to you by MyChart, letter or phone within 4 business days after the clinic has received the results. If you do not hear from us within 7 days, please contact the clinic through MyChart or phone. If you have a critical or abnormal lab result, we will notify you by phone as soon as possible.  Submit refill requests through [employer] or call your pharmacy and they will forward the refill request to us. Please allow 3 business days for your refill to be completed.          Additional Information About Your Visit        MyChart Information     [employer] lets you send messages to your doctor, view your test results, renew your prescriptions, schedule appointments and more. To sign up, go to [email or web address] . Click on \"Log in\" on the left side of the screen, which will take " "you to the Welcome page. Then click on \"Sign up Now\" on the right side of the page.     You will be asked to enter the access code listed below, as well as some personal information. Please follow the directions to create your username and password.     Your access code is: [email or web address]  Expires: 2017  7:02 AM     Your access code will  in 90 days. If you need help or a new code, please call your [place] clinic or [phone].        Care EveryWhere ID     This is your Care EveryWhere ID. This could be used by other organizations to access your [place] medical records  [id number]         Blood Pressure from Last 3 Encounters:   09/15/17 116/60   17 158/68   17 136/68    Weight from Last 3 Encounters:   09/15/17 114 lb (51.7 kg)   17 108 lb (49 kg)   17 102 lb (46.3 kg)              Today, you had the following     No orders found for display       Primary Care Provider Office Phone # Fax #    Keith Lemons [phone]       [facility] [address] [facility] [address]        Equal Access to Services     [facility] AH: Hadii michael ku hadasho Soomaali, waaxda luqadaha, qaybta kaalmada adeegyada, melania crook . So [facility] [phone].    ATENCIÓN: Si habla español, tiene a pittman disposición servicios gratuitos de asistencia lingüística. Llame al [phone].    We comply with applicable federal civil rights laws and [place] laws. We do not discriminate on the basis of race, color, national origin, age, disability, sex, sexual orientation, or gender identity.            Thank you!     Thank you for choosing [facility]  for your care. Our goal is always to provide you with excellent care. Hearing back from our patients is one way we can continue to improve our services. Please take a few minutes to complete the written survey that you may receive in the mail after your visit with us. Thank you!             Your Updated " Medication List - Protect others around you: Learn how to safely use, store and throw away your medicines at www.disposemymeds.org.          This list is accurate as of: 10/16/17 11:59 PM.  Always use your most recent med list.                   Brand Name Dispense Instructions for use Diagnosis    ACETAMINOPHEN PO      Take 325 mg by mouth every 6 hours as needed for pain        amLODIPine 5 MG tablet    NORVASC    30 tablet    Take 1 tablet (5 mg) by mouth daily    Essential hypertension       benztropine 0.5 MG tablet    COGENTIN    60 tablet    Take 1 tablet (0.5 mg) by mouth 2 times daily    Akathisia       buPROPion 300 MG 24 hr tablet    WELLBUTRIN XL    30 tablet    Take 1 tablet (300 mg) by mouth every morning    Severe episode of recurrent major depressive disorder, without psychotic features (H)       cholecalciferol 5000 UNITS Caps     30 capsule    Take 1 capsule (5,000 Units) by mouth daily    Bipolar 1 disorder (H)       CLONIDINE HCL PO      Take 0.1 mg by mouth 2 times daily        donepezil 10 MG tablet    ARICEPT    30 tablet    Take 1 tablet (10 mg) by mouth At Bedtime    Vascular dementia with behavior disturbance       DULoxetine 30 MG EC capsule    CYMBALTA    90 capsule    Take 1 capsule daily    Recurrent major depressive disorder, in partial remission (H)       gabapentin 100 MG capsule    NEURONTIN    90 capsule    Take 1 capsule (100 mg) by mouth 3 times daily    Anxiety       levothyroxine 75 MCG tablet    SYNTHROID/LEVOTHROID    30 tablet    Take 1 tablet (75 mcg) by mouth daily    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 substances       memantine 10 MG tablet    NAMENDA    30 tablet    Take 1 tablet (10 mg) by mouth daily    Vascular dementia with behavior disturbance       multivitamin, therapeutic with minerals Tabs tablet     30 each    Take 1 tablet by mouth daily    Bipolar 1 disorder (H)       PRILOSEC PO      Take 20 mg by mouth every morning        senna-docusate 8.6-50  MG per tablet    SENOKOT-S;PERICOLACE     Take 1 tablet by mouth daily        sertraline 50 MG tablet    ZOLOFT    90 tablet    Start on 25 mg daily for 14 days then increase to 50 mg    Recurrent major depressive disorder, in partial remission (H)       traZODone 100 MG tablet    DESYREL    30 tablet    Take 1 tablet (100 mg) by mouth At Bedtime    Recurrent major depressive disorder, in partial remission (H)

## 2017-10-30 NOTE — PROGRESS NOTES
Treatment Plan    Client's Name: Tamera Kumar  YOB: 1940    Date: October 16, 2017    DSM-V Diagnoses:   296.53 Bipolar I Disorder Current or Most Recent Episode Depressed, Severe    DA Date: 1/23/17  Psychosocial / Contextual Factors:   biological, social, interpersonal    Referral / Collaboration:  Referral to another professional/service is not indicated at this time.    Services to be provided/Interventions:   Interventions will be to alleviate anxiety, alleviate depressed mood, increase ability to function adaptively, increase coping skills, increase self esteem, process traumas, teach CBT skills, teach mindfulness skills and teach relaxation strategies.    Functional Impairment:   Activities of Daily Living, Social / Relational.    Anticipated number of session: 6      MeasurableTreatment Goal(s) related to diagnosis / functional impairment(s)  Goal 1: Client will report a reduction in depressed mood 5 sessions out of 6.     Objective A (Client Action)                  Client will learn IMR and CBT skills to use for reducing depressed mood.     Objective B  Client will report using IMR or CBT skills 5 days out of 7.     Objective C  Client will learn and use mindfulness skills 5 days out of 7.     Goal 2: Client will report a decrease in anxiety 5 out of 6 sessions.     Objective A (Client Action)                  Client will learn 5 relaxation skills.     Objective B  Client will report using relaxations skills 5 days out of 7.        CAROLINE SandersSW

## 2017-10-30 NOTE — PROGRESS NOTES
Progress Note    Client Name: Tamera Kumar  Date: October 16, 2017         Service Type: Individual      Session Start Time: 10:00  Session End Time: 10:53      Session Length: 55 minutes     Session #: 11     Attendees: Client attended alone    DSM-V Diagnoses:   296.53 Bipolar I Disorder Current or Most Recent Episode Depressed, Severe    DA Date: 1/23/17    Treatment Plan Due: 1/25/18  PHQ-9 :   PHQ-9 SCORE 8/11/2017 8/17/2017 9/15/2017   Total Score 25 12 22      BRIGID-7 :    BRIGID-7 SCORE 8/17/2017 9/15/2017 9/18/2017   Total Score 9 15 17           DATA      Progress Since Last Session (Related to Symptoms / Goals / Homework):   Symptoms: worsening    Homework: Completed in session     Current / Ongoing Stressors and Concerns:  Tamera presented as increasingly anxious and displayed a lack of concentration today. This was demonstrated by not being able to complete the questionnaire (attempted in the waiting room and the office). Tamera explained that she wanted to cancel the appointment because she doesn't want to do anything. She reports no interest in activities, communicating with others, or eating. Tamera also reported that the restless hand movements are bothersome. Tamera reported that she worries all day.     Treatment Objective(s) Addressed in This Session:   Objective 1A (Client Action)                  Client will learn IMR and CBT skills to use for reducing depressed mood.  Objective 1C  Client will learn and use mindfulness skills 5 days out of 7.     Intervention:   Provided psycho-education utilizing CBT, cognitive restructuring and mindfulness skills.     Response to Interventions:   Tamera stated she understood this information.      ASSESSMENT: Current Emotional / Mental Status (status of significant symptoms):   Risk status (Self / Other harm or suicidal ideation)   Client denies current fears or concerns for personal safety.   Client denies current or  recent suicidal ideation.   Client denies current or recent homicidal ideation or behaviors.   Client denies current or recent self injurious behavior or ideation.   Client denies other safety concerns.   A safety and risk management plan has not been developed at this time, however client was given the after-hours number / 911 should there be a change in any of these risk factors.     Appearance:   Appropriate    Eye Contact:   Fair    Psychomotor Behavior: Restless    Attitude:   Cooperative    Orientation:   All   Speech    Rate / Production: Normal     Volume:  Normal    Mood:    Anxious  Depressed    Affect:    Flat  Worrisome    Thought Content:  Rumination    Thought Form:  Coherent  Circumstantial   Insight:    Poor         Medication Compliance:   Yes     Changes in Health Issues:   None reported     Chemical Use Review:   Substance Use: Chemical use reviewed, no active concerns identified      Tobacco Use: No current tobacco use.       Collateral Reports Completed:   Not Applicable    PLAN: (Client Tasks / Therapist Tasks / Other)  Tamera was asked to practice the skills at home and to return for follow-up appointments.    CAROLINE SandersSW

## 2017-11-13 DIAGNOSIS — F01.518 VASCULAR DEMENTIA WITH BEHAVIOR DISTURBANCE (H): ICD-10-CM

## 2017-11-14 NOTE — TELEPHONE ENCOUNTER
Aricept      Last Written Prescription Date: 6/22/17  Last Fill Quantity: 30,  # refills: 5   Last Office Visit with [facility], [facility] or [facility] prescribing provider: 9/15/17                                         Next 5 appointments (look out 90 days)     Nov 15, 2017 10:00 AM CST   (Arrive by 9:45 AM)   Return Visit with Fouzia Mason [facility] [place] [facility] ([facility] )    [address] [place], [place] [address] [place] [address] [phone]            Nov 17, 2017  3:30 PM CST   (Arrive by 3:15 PM)   Return Visit with IAN Redd [facility] ([facility] )    [address] [place], [place] [address] [place] [address] [phone]

## 2017-11-15 ENCOUNTER — OFFICE VISIT (OUTPATIENT)
Dept: PSYCHOLOGY | Facility: OTHER | Age: 77
End: 2017-11-15
Attending: SOCIAL WORKER
Payer: COMMERCIAL

## 2017-11-15 DIAGNOSIS — F31.30 BIPOLAR I DISORDER, MOST RECENT EPISODE DEPRESSED (H): Primary | ICD-10-CM

## 2017-11-15 PROCEDURE — 90837 PSYTX W PT 60 MINUTES: CPT | Performed by: SOCIAL WORKER

## 2017-11-15 RX ORDER — DONEPEZIL HYDROCHLORIDE 10 MG/1
TABLET, FILM COATED ORAL
Qty: 30 TABLET | Refills: 8 | Status: SHIPPED | OUTPATIENT
Start: 2017-11-15 | End: 2018-01-01

## 2017-11-15 ASSESSMENT — ANXIETY QUESTIONNAIRES
7. FEELING AFRAID AS IF SOMETHING AWFUL MIGHT HAPPEN: MORE THAN HALF THE DAYS
1. FEELING NERVOUS, ANXIOUS, OR ON EDGE: SEVERAL DAYS
IF YOU CHECKED OFF ANY PROBLEMS ON THIS QUESTIONNAIRE, HOW DIFFICULT HAVE THESE PROBLEMS MADE IT FOR YOU TO DO YOUR WORK, TAKE CARE OF THINGS AT HOME, OR GET ALONG WITH OTHER PEOPLE: VERY DIFFICULT
5. BEING SO RESTLESS THAT IT IS HARD TO SIT STILL: NOT AT ALL
3. WORRYING TOO MUCH ABOUT DIFFERENT THINGS: MORE THAN HALF THE DAYS
2. NOT BEING ABLE TO STOP OR CONTROL WORRYING: MORE THAN HALF THE DAYS

## 2017-11-15 ASSESSMENT — PATIENT HEALTH QUESTIONNAIRE - PHQ9: 5. POOR APPETITE OR OVEREATING: SEVERAL DAYS

## 2017-11-15 NOTE — MR AVS SNAPSHOT
After Visit Summary   11/15/2017    Tamera Kumar    MRN: [id number]           Patient Information     Date Of Birth          1940        Visit Information        Provider Department      11/15/2017 10:00 AM Fouzia Mason, [facility]        Today's Diagnoses     Bipolar I disorder, most recent episode depressed (H)    -  1       Follow-ups after your visit        Your next 10 appointments already scheduled     Nov 29, 2017 10:00 AM CST   (Arrive by 9:45 AM)   Return Visit with Fouzia Posadaerine Isabella, [facility] ([facility] )    [address] [place] [address]   [phone]            Dec 13, 2017 10:00 AM CST   (Arrive by 9:45 AM)   Return Visit with Fuozia Mason, [facility] ([facility] )    [address] [place] [address]   [phone]            Jan 12, 2018  1:30 PM CST   (Arrive by 1:15 PM)   Return Visit with IAN Redd [facility] ([facility] )    [address] [place] [address]   [phone]              Who to contact     If you have questions or need follow up information about today's clinic visit or your schedule please contact [facility] directly at [phone].  Normal or non-critical lab and imaging results will be communicated to you by MyChart, letter or phone within 4 business days after the clinic has received the results. If you do not hear from us within 7 days, please contact the clinic through MyChart or phone. If you have a critical or abnormal lab result, we will notify you by phone as soon as possible.  Submit refill requests through [employer] or call your pharmacy and they will forward the refill request to us. Please allow 3 business days for your refill to be completed.          Additional Information About Your Visit        MyChart Information     [facility] lets  "you send messages to your doctor, view your test results, renew your prescriptions, schedule appointments and more. To sign up, go to [email or web address] . Click on \"Log in\" on the left side of the screen, which will take you to the Welcome page. Then click on \"Sign up Now\" on the right side of the page.     You will be asked to enter the access code listed below, as well as some personal information. Please follow the directions to create your username and password.     Your access code is: [email or web address]  Expires: 2018 12:59 AM     Your access code will  in 90 days. If you need help or a new code, please call your [place] clinic or [phone].        Care EveryWhere ID     This is your Care EveryWhere ID. This could be used by other organizations to access your [place] medical records  [id number]         Blood Pressure from Last 3 Encounters:   17 130/56   17 110/50   09/15/17 116/60    Weight from Last 3 Encounters:   17 112 lb (50.8 kg)   09/15/17 114 lb (51.7 kg)   17 108 lb (49 kg)              Today, you had the following     No orders found for display       Primary Care Provider Office Phone # Fax #    Keith Lemons [phone]       [facility] [address] [facility] [address]        Equal Access to Services     [facility] Hospital[facility] AH: Hadii michael foxo Sosera, waaxda luqadaha, qaybta kaalmada adeegyada, melania crook . So [facility] [phone].    ATENCIÓN: Si nadinela natty, tiene a pittman disposición servicios gratuitos de asistencia lingüística. Hardy al [phone].    We comply with applicable federal civil rights laws and [place] laws. We do not discriminate on the basis of race, color, national origin, age, disability, sex, sexual orientation, or gender identity.            Thank you!     Thank you for choosing [facility]  for your care. Our goal is always to provide you with excellent care. Hearing " back from our patients is one way we can continue to improve our services. Please take a few minutes to complete the written survey that you may receive in the mail after your visit with us. Thank you!             Your Updated Medication List - Protect others around you: Learn how to safely use, store and throw away your medicines at www.disposemymeds.org.          This list is accurate as of: 11/15/17 11:59 PM.  Always use your most recent med list.                   Brand Name Dispense Instructions for use Diagnosis    ACETAMINOPHEN PO      Take 325 mg by mouth every 6 hours as needed for pain        amLODIPine 5 MG tablet    NORVASC    30 tablet    Take 1 tablet (5 mg) by mouth daily    Essential hypertension       buPROPion 300 MG 24 hr tablet    WELLBUTRIN XL    30 tablet    Take 1 tablet (300 mg) by mouth every morning    Severe episode of recurrent major depressive disorder, without psychotic features (H)       cholecalciferol 5000 UNITS Caps     30 capsule    Take 1 capsule (5,000 Units) by mouth daily    Bipolar 1 disorder (H)       CLONIDINE HCL PO      Take 0.1 mg by mouth 2 times daily        donepezil 10 MG tablet    ARICEPT    30 tablet    Take 1 tablet (10 mg) by mouth At Bedtime    Vascular dementia with behavior disturbance       DULoxetine 30 MG EC capsule    CYMBALTA    90 capsule    Take 1 capsule daily    Recurrent major depressive disorder, in partial remission (H)       gabapentin 100 MG capsule    NEURONTIN    90 capsule    Take 1 capsule (100 mg) by mouth 3 times daily    Anxiety       levothyroxine 75 MCG tablet    SYNTHROID/LEVOTHROID    30 tablet    Take 1 tablet (75 mcg) by mouth daily    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 substances       memantine 10 MG tablet    NAMENDA    30 tablet    Take 1 tablet (10 mg) by mouth daily    Vascular dementia with behavior disturbance       multivitamin, therapeutic with minerals Tabs tablet     30 each    Take 1 tablet by mouth daily     Bipolar 1 disorder (H)       PRILOSEC PO      Take 20 mg by mouth every morning        senna-docusate 8.6-50 MG per tablet    SENOKOT-S;PERICOLACE     Take 1 tablet by mouth daily        traZODone 100 MG tablet    DESYREL    30 tablet    Take 1 tablet (100 mg) by mouth At Bedtime    Recurrent major depressive disorder, in partial remission (H)

## 2017-11-17 ENCOUNTER — MEDICAL CORRESPONDENCE (OUTPATIENT)
Dept: HEALTH INFORMATION MANAGEMENT | Facility: HOSPITAL | Age: 77
End: 2017-11-17

## 2017-11-17 ENCOUNTER — HOSPITAL ENCOUNTER (EMERGENCY)
Facility: HOSPITAL | Age: 77
Discharge: HOME | End: 2017-11-18
Attending: FAMILY MEDICINE | Admitting: FAMILY MEDICINE
Payer: COMMERCIAL

## 2017-11-17 ENCOUNTER — APPOINTMENT (OUTPATIENT)
Dept: CT IMAGING | Facility: HOSPITAL | Age: 77
End: 2017-11-17
Attending: FAMILY MEDICINE
Payer: COMMERCIAL

## 2017-11-17 ENCOUNTER — OFFICE VISIT (OUTPATIENT)
Dept: PSYCHIATRY | Facility: OTHER | Age: 77
End: 2017-11-17
Attending: NURSE PRACTITIONER
Payer: COMMERCIAL

## 2017-11-17 VITALS
HEART RATE: 63 BPM | WEIGHT: 112 LBS | DIASTOLIC BLOOD PRESSURE: 50 MMHG | TEMPERATURE: 99.1 F | BODY MASS INDEX: 20.61 KG/M2 | HEIGHT: 62 IN | OXYGEN SATURATION: 95 % | SYSTOLIC BLOOD PRESSURE: 110 MMHG

## 2017-11-17 DIAGNOSIS — G24.01 TARDIVE DYSKINESIA: ICD-10-CM

## 2017-11-17 DIAGNOSIS — S01.81XA FACIAL LACERATION, INITIAL ENCOUNTER: ICD-10-CM

## 2017-11-17 DIAGNOSIS — F33.2 SEVERE EPISODE OF RECURRENT MAJOR DEPRESSIVE DISORDER, WITHOUT PSYCHOTIC FEATURES (H): Primary | ICD-10-CM

## 2017-11-17 LAB
ALBUMIN UR-MCNC: 30 MG/DL
ANION GAP SERPL CALCULATED.3IONS-SCNC: 9 MMOL/L (ref 3–14)
APPEARANCE UR: CLEAR
BACTERIA #/AREA URNS HPF: ABNORMAL /HPF
BASOPHILS # BLD AUTO: 0 10E9/L (ref 0–0.2)
BASOPHILS NFR BLD AUTO: 0.5 %
BILIRUB UR QL STRIP: NEGATIVE
BUN SERPL-MCNC: 25 MG/DL (ref 7–30)
CALCIUM SERPL-MCNC: 8.7 MG/DL (ref 8.5–10.1)
CHLORIDE SERPL-SCNC: 106 MMOL/L (ref 94–109)
CO2 SERPL-SCNC: 26 MMOL/L (ref 20–32)
COLOR UR AUTO: ABNORMAL
CREAT SERPL-MCNC: 2.34 MG/DL (ref 0.52–1.04)
DIFFERENTIAL METHOD BLD: ABNORMAL
EOSINOPHIL # BLD AUTO: 0.2 10E9/L (ref 0–0.7)
EOSINOPHIL NFR BLD AUTO: 3.8 %
ERYTHROCYTE [DISTWIDTH] IN BLOOD BY AUTOMATED COUNT: 13.4 % (ref 10–15)
GFR SERPL CREATININE-BSD FRML MDRD: 20 ML/MIN/1.7M2
GLUCOSE SERPL-MCNC: 103 MG/DL (ref 70–99)
GLUCOSE UR STRIP-MCNC: NEGATIVE MG/DL
HCT VFR BLD AUTO: 29.6 % (ref 35–47)
HGB BLD-MCNC: 9.7 G/DL (ref 11.7–15.7)
HGB UR QL STRIP: NEGATIVE
IMM GRANULOCYTES # BLD: 0 10E9/L (ref 0–0.4)
IMM GRANULOCYTES NFR BLD: 0.2 %
KETONES UR STRIP-MCNC: NEGATIVE MG/DL
LEUKOCYTE ESTERASE UR QL STRIP: ABNORMAL
LYMPHOCYTES # BLD AUTO: 1.6 10E9/L (ref 0.8–5.3)
LYMPHOCYTES NFR BLD AUTO: 25.9 %
MCH RBC QN AUTO: 29.9 PG (ref 26.5–33)
MCHC RBC AUTO-ENTMCNC: 32.8 G/DL (ref 31.5–36.5)
MCV RBC AUTO: 91 FL (ref 78–100)
MONOCYTES # BLD AUTO: 0.4 10E9/L (ref 0–1.3)
MONOCYTES NFR BLD AUTO: 6.4 %
NEUTROPHILS # BLD AUTO: 4 10E9/L (ref 1.6–8.3)
NEUTROPHILS NFR BLD AUTO: 63.2 %
NITRATE UR QL: NEGATIVE
NRBC # BLD AUTO: 0 10*3/UL
NRBC BLD AUTO-RTO: 0 /100
PH UR STRIP: 6.5 PH (ref 4.7–8)
PLATELET # BLD AUTO: 261 10E9/L (ref 150–450)
POTASSIUM SERPL-SCNC: 4.2 MMOL/L (ref 3.4–5.3)
RBC # BLD AUTO: 3.24 10E12/L (ref 3.8–5.2)
RBC #/AREA URNS AUTO: 0 /HPF (ref 0–2)
SODIUM SERPL-SCNC: 141 MMOL/L (ref 133–144)
SOURCE: ABNORMAL
SP GR UR STRIP: 1 (ref 1–1.03)
SQUAMOUS #/AREA URNS AUTO: 1 /HPF (ref 0–1)
UROBILINOGEN UR STRIP-MCNC: NORMAL MG/DL (ref 0–2)
WBC # BLD AUTO: 6.3 10E9/L (ref 4–11)
WBC #/AREA URNS AUTO: 11 /HPF (ref 0–2)

## 2017-11-17 PROCEDURE — 99214 OFFICE O/P EST MOD 30 MIN: CPT | Performed by: NURSE PRACTITIONER

## 2017-11-17 PROCEDURE — 36415 COLL VENOUS BLD VENIPUNCTURE: CPT | Performed by: FAMILY MEDICINE

## 2017-11-17 PROCEDURE — 99284 EMERGENCY DEPT VISIT MOD MDM: CPT | Mod: 25

## 2017-11-17 PROCEDURE — 99212 OFFICE O/P EST SF 10 MIN: CPT

## 2017-11-17 PROCEDURE — 99284 EMERGENCY DEPT VISIT MOD MDM: CPT | Mod: 25 | Performed by: FAMILY MEDICINE

## 2017-11-17 PROCEDURE — 81001 URINALYSIS AUTO W/SCOPE: CPT | Performed by: FAMILY MEDICINE

## 2017-11-17 PROCEDURE — 12011 RPR F/E/E/N/L/M 2.5 CM/<: CPT | Performed by: FAMILY MEDICINE

## 2017-11-17 PROCEDURE — 90471 IMMUNIZATION ADMIN: CPT

## 2017-11-17 PROCEDURE — 72125 CT NECK SPINE W/O DYE: CPT | Mod: TC

## 2017-11-17 PROCEDURE — 85025 COMPLETE CBC W/AUTO DIFF WBC: CPT | Performed by: FAMILY MEDICINE

## 2017-11-17 PROCEDURE — 12011 RPR F/E/E/N/L/M 2.5 CM/<: CPT

## 2017-11-17 PROCEDURE — 70450 CT HEAD/BRAIN W/O DYE: CPT | Mod: TC

## 2017-11-17 PROCEDURE — 87086 URINE CULTURE/COLONY COUNT: CPT | Performed by: FAMILY MEDICINE

## 2017-11-17 PROCEDURE — 80048 BASIC METABOLIC PNL TOTAL CA: CPT | Performed by: FAMILY MEDICINE

## 2017-11-17 RX ORDER — TRIHEXYPHENIDYL HYDROCHLORIDE 2 MG/1
2 TABLET ORAL 3 TIMES DAILY
Qty: 90 TABLET | Refills: 3 | Status: SHIPPED | OUTPATIENT
Start: 2017-11-17 | End: 2018-01-01

## 2017-11-17 RX ORDER — POLYETHYLENE GLYCOL 3350 17 G/17G
17 POWDER, FOR SOLUTION ORAL DAILY
Status: ON HOLD | COMMUNITY
End: 2018-01-01

## 2017-11-17 ASSESSMENT — ENCOUNTER SYMPTOMS
NUMBNESS: 0
DIZZINESS: 1
DIAPHORESIS: 0
CARDIOVASCULAR NEGATIVE: 1
LIGHT-HEADEDNESS: 0
FEVER: 0
PSYCHIATRIC NEGATIVE: 1
CHILLS: 0
WEAKNESS: 0
HEMATOLOGIC/LYMPHATIC NEGATIVE: 1
EYES NEGATIVE: 1
ACTIVITY CHANGE: 0
FATIGUE: 0
RESPIRATORY NEGATIVE: 1
FACIAL ASYMMETRY: 0
UNEXPECTED WEIGHT CHANGE: 0
HEADACHES: 0
ENDOCRINE NEGATIVE: 1
SEIZURES: 0
APPETITE CHANGE: 0
CONSTITUTIONAL NEGATIVE: 1
SPEECH DIFFICULTY: 0
TREMORS: 0

## 2017-11-17 ASSESSMENT — ANXIETY QUESTIONNAIRES
GAD7 TOTAL SCORE: 14
5. BEING SO RESTLESS THAT IT IS HARD TO SIT STILL: SEVERAL DAYS
3. WORRYING TOO MUCH ABOUT DIFFERENT THINGS: NEARLY EVERY DAY
2. NOT BEING ABLE TO STOP OR CONTROL WORRYING: NEARLY EVERY DAY
1. FEELING NERVOUS, ANXIOUS, OR ON EDGE: NEARLY EVERY DAY
7. FEELING AFRAID AS IF SOMETHING AWFUL MIGHT HAPPEN: SEVERAL DAYS
6. BECOMING EASILY ANNOYED OR IRRITABLE: MORE THAN HALF THE DAYS
IF YOU CHECKED OFF ANY PROBLEMS ON THIS QUESTIONNAIRE, HOW DIFFICULT HAVE THESE PROBLEMS MADE IT FOR YOU TO DO YOUR WORK, TAKE CARE OF THINGS AT HOME, OR GET ALONG WITH OTHER PEOPLE: EXTREMELY DIFFICULT

## 2017-11-17 ASSESSMENT — PAIN SCALES - GENERAL: PAINLEVEL: MILD PAIN (2)

## 2017-11-17 ASSESSMENT — PATIENT HEALTH QUESTIONNAIRE - PHQ9: 5. POOR APPETITE OR OVEREATING: SEVERAL DAYS

## 2017-11-17 NOTE — ED AVS SNAPSHOT
[facility]    [address] [place] [address]    Phone:  [phone]                                       Tamera Kumar   MRN: [id number]    Department:  [facility]   Date of Visit:  11/17/2017           After Visit Summary Signature Page     I have received my discharge instructions, and my questions have been answered. I have discussed any challenges I see with this plan with the nurse or doctor.    ..........................................................................................................................................  Patient/Patient Representative Signature      ..........................................................................................................................................  Patient Representative Print Name and Relationship to Patient    ..................................................               ................................................  Date                                            Time    ..........................................................................................................................................  Reviewed by Signature/Title    ...................................................              ..............................................  Date                                                            Time

## 2017-11-17 NOTE — PATIENT INSTRUCTIONS
(F33.2) Severe episode of recurrent major depressive disorder, without psychotic features (H)  (primary encounter diagnosis)  Comment:   Plan: trihexyphenidyl (ARTANE) 2 MG tablet        (G24.01) Tardive dyskinesia  Comment:   Plan: Stop taking cogentin. Start Artane.    Decrease Zoloft 50 mg to 25 mg for 7 days then discontinue.            Depression Affects Your Mind and Body  Everyone feels sad or  blue  from time to time for a few days or weeks. Depression is when these feelings don't go away and they interfere with daily life.  Depression is a real illness that can develop at any age. It is one of the most common mental health problems in the [place] Depression makes you feel sad, helpless, and hopeless. It gets in the way of your life and relationships. It inhibits your ability to think and act. But, with help, you can feel better again.      When I was depressed, I felt awful. I was so tired all the time I could hardly think, but at night I couldn t fall asleep. My head hurt. My stomach hurt. I didn t know what was wrong with me.    Depression affects your whole body  Brain chemicals affect your body as well as your mood. So depression may do more than just make you feel low. You may also feel bad physically. Depression can:    Cause trouble with mental tasks such as remembering, concentrating, or making decisions    Make you feel nervous and jumpy    Cause trouble sleeping. Or you may sleep too much    Change your appetite    Cause headaches, stomachaches, or other aches and pains    Drain your body of energy  Depression and other illness  It is common for people who have chronic health problems to also have depression. It can often be hard to tell which one caused the other. A person might become depressed after finding out they have a health problem. But some studies suggest being depressed may make certain health problems more likely. And some depressed people stop taking care of themselves. This may make  them more likely to get sick.  Date Last Reviewed: 1/1/2017 2000-2017 [employer]. [address] [place] [address], [place] [address]. All rights reserved. This information is not intended as a substitute for professional medical care. Always follow your healthcare professional's instructions.

## 2017-11-17 NOTE — NURSING NOTE
"Chief Complaint   Patient presents with     Mental Health Problem       Initial /50 (BP Location: Left arm, Patient Position: Chair, Cuff Size: Adult Regular)  Pulse 63  Temp 99.1  F (37.3  C) (Tympanic)  Ht 5' 1.5\" (1.562 m)  Wt 112 lb (50.8 kg)  SpO2 95%  BMI 20.82 kg/m2 Estimated body mass index is 20.82 kg/(m^2) as calculated from the following:    Height as of this encounter: 5' 1.5\" (1.562 m).    Weight as of this encounter: 112 lb (50.8 kg).  Medication Reconciliation: complete   KATI ARRIAZA      "

## 2017-11-17 NOTE — MR AVS SNAPSHOT
After Visit Summary   11/17/2017    Tamera Kumar    MRN: [id number]           Patient Information     Date Of Birth          1940        Visit Information        Provider Department      11/17/2017 3:30 PM Naomi Cook APRN [facility]        Today's Diagnoses     Severe episode of recurrent major depressive disorder, without psychotic features (H)    -  1    Tardive dyskinesia          Care Instructions    (F33.2) Severe episode of recurrent major depressive disorder, without psychotic features (H)  (primary encounter diagnosis)  Comment:   Plan: trihexyphenidyl (ARTANE) 2 MG tablet        (G24.01) Tardive dyskinesia  Comment:   Plan: Stop taking cogentin. Start Artane.    Decrease Zoloft 50 mg to 25 mg for 7 days then discontinue.            Depression Affects Your Mind and Body  Everyone feels sad or  blue  from time to time for a few days or weeks. Depression is when these feelings don't go away and they interfere with daily life.  Depression is a real illness that can develop at any age. It is one of the most common mental health problems in the [place] Depression makes you feel sad, helpless, and hopeless. It gets in the way of your life and relationships. It inhibits your ability to think and act. But, with help, you can feel better again.      When I was depressed, I felt awful. I was so tired all the time I could hardly think, but at night I couldn t fall asleep. My head hurt. My stomach hurt. I didn t know what was wrong with me.    Depression affects your whole body  Brain chemicals affect your body as well as your mood. So depression may do more than just make you feel low. You may also feel bad physically. Depression can:    Cause trouble with mental tasks such as remembering, concentrating, or making decisions    Make you feel nervous and jumpy    Cause trouble sleeping. Or you may sleep too much    Change your appetite    Cause headaches, stomachaches, or other  aches and pains    Drain your body of energy  Depression and other illness  It is common for people who have chronic health problems to also have depression. It can often be hard to tell which one caused the other. A person might become depressed after finding out they have a health problem. But some studies suggest being depressed may make certain health problems more likely. And some depressed people stop taking care of themselves. This may make them more likely to get sick.  Date Last Reviewed: 1/1/2017 2000-2017 [employer]. [address] [place] [address]. All rights reserved. This information is not intended as a substitute for professional medical care. Always follow your healthcare professional's instructions.                Follow-ups after your visit        Follow-up notes from your care team     Return in about 3 months (around 2/17/2018).      Your next 10 appointments already scheduled     Nov 29, 2017 10:00 AM CST   (Arrive by 9:45 AM)   [facility]    [address] [place] [address]   [phone]            Dec 13, 2017 10:00 AM CST   (Arrive by 9:45 AM)   Return Visit with Fouzia Mason [facility] ([facility] )    [address] [place] [id number] [phone]            Jan 12, 2018  1:30 PM CST   (Arrive by 1:15 PM)   Return Visit with IAN Redd [facility] ([facility] )    [address] [place] [address] [phone]              Who to contact     If you have questions or need follow up information about today's clinic visit or your schedule please contact [facility] directly at [phone].  Normal or non-critical lab and imaging results will be communicated to you by MyChart, letter or phone within 4 business days after the  "clinic has received the results. If you do not hear from us within 7 days, please contact the clinic through [employer] or phone. If you have a critical or abnormal lab result, we will notify you by phone as soon as possible.  Submit refill requests through [employer] or call your pharmacy and they will forward the refill request to us. Please allow 3 business days for your refill to be completed.          Additional Information About Your Visit        [employer] Interactive Information     [employer] lets you send messages to your doctor, view your test results, renew your prescriptions, schedule appointments and more. To sign up, go to [email or web address] . Click on \"Log in\" on the left side of the screen, which will take you to the Welcome page. Then click on \"Sign up Now\" on the right side of the page.     You will be asked to enter the access code listed below, as well as some personal information. Please follow the directions to create your username and password.     Your access code is: [email or web address]  Expires: 2018 12:59 AM     Your access code will  in 90 days. If you need help or a new code, please call your [place] clinic or [phone].        Care EveryWhere ID     This is your Care EveryWhere ID. This could be used by other organizations to access your [place] medical records  [id number]        Your Vitals Were     Pulse Temperature Height Pulse Oximetry BMI (Body Mass Index)       63 99.1  F (37.3  C) (Tympanic) 5' 1.5\" (1.562 m) 95% 20.82 kg/m2        Blood Pressure from Last 3 Encounters:   17 130/56   17 110/50   09/15/17 116/60    Weight from Last 3 Encounters:   17 112 lb (50.8 kg)   09/15/17 114 lb (51.7 kg)   17 108 lb (49 kg)              Today, you had the following     No orders found for display         Today's Medication Changes          These changes are accurate as of: 17 11:59 PM.  If you have any questions, ask your nurse or doctor.               Start taking " these medicines.        Dose/Directions    trihexyphenidyl 2 MG tablet   Commonly known as:  ARTANE   Used for:  Severe episode of recurrent major depressive disorder, without psychotic features (H)   Started by:  Naomi Cook APRN CNP        Dose:  2 mg   Take 1 tablet (2 mg) by mouth 3 times daily   Quantity:  90 tablet   Refills:  3         These medicines have changed or have updated prescriptions.        Dose/Directions    ZOLOFT PO   This may have changed:  Another medication with the same name was removed. Continue taking this medication, and follow the directions you see here.        Dose:  25 mg   Take 25 mg by mouth daily   Refills:  0         Stop taking these medicines if you haven't already. Please contact your care team if you have questions.     benztropine 0.5 MG tablet   Commonly known as:  COGENTIN   Stopped by:  Naomi Cook APRN CNP                Where to get your medicines      These medications were sent to [address] [address]     Phone:  [phone]     trihexyphenidyl 2 MG tablet                Primary Care Provider Office Phone # Fax #    Keith Lemons [phone]       [facility] [address] [facility] [address]        Equal Access to Services     REGINE MO AH: Hadii michael kelly haddeloreso Socarlosali, waaxda luqadaha, qaybta kaalmada adeegyada, melania mccain. So [facility] [phone].    ATENCIÓN: Si habla español, tiene a pittman disposición servicios gratuitos de asistencia lingüística. Llame al [phone].    We comply with applicable federal civil rights laws and [place] laws. We do not discriminate on the basis of race, color, national origin, age, disability, sex, sexual orientation, or gender identity.            Thank you!     Thank you for choosing [facility]  for your care. Our goal is always to provide you with excellent care. Hearing back from our  patients is one way we can continue to improve our services. Please take a few minutes to complete the written survey that you may receive in the mail after your visit with us. Thank you!             Your Updated Medication List - Protect others around you: Learn how to safely use, store and throw away your medicines at www.disposemymeds.org.          This list is accurate as of: 11/17/17 11:59 PM.  Always use your most recent med list.                   Brand Name Dispense Instructions for use Diagnosis    ACETAMINOPHEN PO      Take 325 mg by mouth every 6 hours as needed for pain        amLODIPine 5 MG tablet    NORVASC    30 tablet    Take 1 tablet (5 mg) by mouth daily    Essential hypertension       buPROPion 300 MG 24 hr tablet    WELLBUTRIN XL    30 tablet    Take 1 tablet (300 mg) by mouth every morning    Severe episode of recurrent major depressive disorder, without psychotic features (H)       cholecalciferol 5000 UNITS Caps     30 capsule    Take 1 capsule (5,000 Units) by mouth daily    Bipolar 1 disorder (H)       CLONIDINE HCL PO      Take 0.1 mg by mouth 2 times daily        donepezil 10 MG tablet    ARICEPT    30 tablet    Take 1 tablet (10 mg) by mouth At Bedtime    Vascular dementia with behavior disturbance       DULoxetine 30 MG EC capsule    CYMBALTA    90 capsule    Take 1 capsule daily    Recurrent major depressive disorder, in partial remission (H)       gabapentin 100 MG capsule    NEURONTIN    90 capsule    Take 1 capsule (100 mg) by mouth 3 times daily    Anxiety       levothyroxine 75 MCG tablet    SYNTHROID/LEVOTHROID    30 tablet    Take 1 tablet (75 mcg) by mouth daily    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 substances       memantine 10 MG tablet    NAMENDA    30 tablet    Take 1 tablet (10 mg) by mouth daily    Vascular dementia with behavior disturbance       MIRALAX Packet   Generic drug:  polyethylene glycol      Take 17 g by mouth daily        multivitamin, therapeutic  with minerals Tabs tablet     30 each    Take 1 tablet by mouth daily    Bipolar 1 disorder (H)       PRILOSEC PO      Take 20 mg by mouth every morning        senna-docusate 8.6-50 MG per tablet    SENOKOT-S;PERICOLACE     Take 1 tablet by mouth daily        * TRAZODONE HCL PO      Take 100 mg by mouth nightly as needed for sleep        * traZODone 100 MG tablet    DESYREL    30 tablet    Take 1 tablet (100 mg) by mouth At Bedtime    Recurrent major depressive disorder, in partial remission (H)       trihexyphenidyl 2 MG tablet    ARTANE    90 tablet    Take 1 tablet (2 mg) by mouth 3 times daily    Severe episode of recurrent major depressive disorder, without psychotic features (H)       ZOLOFT PO      Take 25 mg by mouth daily        * Notice:  This list has 2 medication(s) that are the same as other medications prescribed for you. Read the directions carefully, and ask your doctor or other care provider to review them with you.

## 2017-11-17 NOTE — ED AVS SNAPSHOT
[facility]    [address] [address]    Phone:  [phone]                                       Tamera Kumar   MRN: [id number]    Department:  [facility]   Date of Visit:  11/17/2017           Patient Information     Date Of Birth          1940        Your diagnoses for this visit were:     Facial laceration, initial encounter        You were seen by Marisa Martinez MD.      Follow-up Information     Follow up with Keith Lemons MD In 1 week.    Specialty:  Family Practice    Why:  For suture removal    Contact information:    [facility]  [address] [place] [id number] [phone]          Please follow up.    Why:  and for recheck hgb and kidney functions         Discharge Instructions          * LACERATION (All Closures)  A laceration is a cut through the skin. This will usually require stitches (sutures) or staples if it is deep. Minor cuts may be treated with a tape closure ( Steri-Strips ) or Dermabond skin glue.       HOME CARE:  PAIN MEDICINE: You may use acetaminophen (Tylenol) 650-1000 mg every 6 hours or ibuprofen (Motrin, Advil) 600 mg every 6-8 hours with food to control pain, if you are able to take these medicines. [NOTE: If you have chronic liver or kidney disease or ever had a stomach ulcer or GI bleeding, talk with your doctor before using these medicines.]  EXTREMITY, FACE or TRUNK WOUNDS:    Keep the wound clean and dry. If a bandage was applied and it becomes wet or dirty, replace it. Otherwise, leave it in place for the first 24 hours.    If stitches or staples were used, clean the wound daily. Protect the wound from sunlight and tanning lamps.    After removing the bandage, wash the area with soap and water. Use a wet cotton swab (Q tip) to loosen and remove any blood or crust that forms.    After cleaning, apply a thin layer of Polysporin or Bacitracin ointment. This will keep the wound clean and make  it easier to remove the stitches or staples. Reapply a fresh bandage.    You may remove the bandage to shower as usual after the first 24 hours, but do not soak the area in water (no swimming) until the stitches or staples are removed.    If Steri-Strips were used, keep the area clean and dry. If it becomes wet, blot it dry with a towel. It is okay to take a brief shower, but avoid scrubbing the area.    If Dermabond skin adhesive was used, do not scratch, rub or pick at the adhesive film. Do not place tape directly over the film. Do not apply liquid, ointment or creams to the wound while the film is in place. Do not clean the wound with peroxide and do not apply ointments. Avoid activities that cause heavy sweating until the film has fallen off. Protect the wound from prolonged exposure to sunlight or tanning lamps. You may shower as usual but do not soak the wound in water (no baths or swimming). The film will fall off by itself in 5-10 days.  SCALP WOUNDS: During the first two days, you may carefully rinse your hair in the shower to remove blood, glass or dirt particles. After two days, you may shower and shampoo your hair normally. Do not soak your scalp in the tub or go swimming until the stitches or staples have been removed.  MOUTH WOUNDS: Eat soft foods to reduce pain. If the cut is inside of your mouth, clean by rinsing after each meal and at bedtime with a mixture of equal parts water and Hydrogen Peroxide (do not swallow!). Or, you can use a cotton swab to directly apply Hydrogen Peroxide onto the cut.  After the wound is done healing, use sunscreen over the area whenever exposed for the next 6 minths to avoid a darker scar.     FOLLOW UP: Most skin wounds heal within ten days. Mouth and facial wounds heal within five days. However, even with proper treatment, a wound infection may sometimes occur. Therefore, you should check the wound daily for signs of infection listed below.  Stitches should be removed  from the face within five days; stitches and staples should be removed from other parts of the body within 7-10 days. Unless you are told to come back to the emergency room, you may have your doctor or urgent care remove the stitches. If dissolving stitches were used in the mouth, these will fall out or dissolve without the need for removal. If tape closures ( Steri-Strips ) were used, remove them yourself if they have not fallen off after 7 days. If Dermabond skin glue was used, the film will fall off by itself in 5-10 days.      GET PROMPT MEDICAL ATTENTION  if any of the following occur:    Increasing pain in the wound    Redness, swelling or pus coming from the wound    Fever over 101 F (38.3 C) oral    If stitches or staples come apart or fall out or if Steri-Strips fall off before seven days    If the wound edges re-open    Bleeding not controlled by direct pressure    4185-1984 [employer]. [address] [place] [address], [place] [address]. All rights reserved. This information is not intended as a substitute for professional medical care. Always follow your healthcare professional's instructions.  This information has been modified by your health care provider with permission from the publisher.      Future Appointments        Provider Department Dept Phone Center    11/29/2017 10:00 AM VENECIA Sanders [facility] [phone] [facility]    12/13/2017 10:00 AM VENECIA Sanders [facility] [phone] [facility]    1/12/2018 1:30 PM IAN Robles [facility] [phone] [facility]         Review of your medicines      Our records show that you are taking the medicines listed below. If these are incorrect, please call your family doctor or clinic.        Dose / Directions Last dose taken    ACETAMINOPHEN PO   Dose:  325 mg        Take 325 mg by mouth every 6 hours as needed for pain   Refills:  0        amLODIPine 5 MG tablet    Commonly known as:  NORVASC   Dose:  5 mg   Quantity:  30 tablet        Take 1 tablet (5 mg) by mouth daily   Refills:  3        buPROPion 300 MG 24 hr tablet   Commonly known as:  WELLBUTRIN XL   Dose:  300 mg   Quantity:  30 tablet        Take 1 tablet (300 mg) by mouth every morning   Refills:  3        cholecalciferol 5000 UNITS Caps   Dose:  5000 Units   Quantity:  30 capsule        Take 1 capsule (5,000 Units) by mouth daily   Refills:  0        CLONIDINE HCL PO   Dose:  0.1 mg        Take 0.1 mg by mouth 2 times daily   Refills:  0        donepezil 10 MG tablet   Commonly known as:  ARICEPT   Quantity:  30 tablet        Take 1 tablet (10 mg) by mouth At Bedtime   Refills:  8        DULoxetine 30 MG EC capsule   Commonly known as:  CYMBALTA   Quantity:  90 capsule        Take 1 capsule daily   Refills:  3        gabapentin 100 MG capsule   Commonly known as:  NEURONTIN   Quantity:  90 capsule        Take 1 capsule (100 mg) by mouth 3 times daily   Refills:  0        levothyroxine 75 MCG tablet   Commonly known as:  SYNTHROID/LEVOTHROID   Dose:  75 mcg   Quantity:  30 tablet        Take 1 tablet (75 mcg) by mouth daily   Refills:  0        memantine 10 MG tablet   Commonly known as:  NAMENDA   Dose:  10 mg   Quantity:  30 tablet        Take 1 tablet (10 mg) by mouth daily   Refills:  0        MIRALAX Packet   Dose:  17 g   Generic drug:  polyethylene glycol        Take 17 g by mouth daily   Refills:  0        multivitamin, therapeutic with minerals Tabs tablet   Dose:  1 tablet   Quantity:  30 each        Take 1 tablet by mouth daily   Refills:  0        PRILOSEC PO   Dose:  20 mg        Take 20 mg by mouth every morning   Refills:  0        senna-docusate 8.6-50 MG per tablet   Commonly known as:  SENOKOT-S;PERICOLACE   Dose:  1 tablet        Take 1 tablet by mouth daily   Refills:  0        * TRAZODONE HCL PO   Dose:  100 mg        Take 100 mg by mouth nightly as needed for sleep   Refills:  0        *  "traZODone 100 MG tablet   Commonly known as:  DESYREL   Dose:  100 mg   Quantity:  30 tablet        Take 1 tablet (100 mg) by mouth At Bedtime   Refills:  3        trihexyphenidyl 2 MG tablet   Commonly known as:  ARTANE   Dose:  2 mg   Quantity:  90 tablet        Take 1 tablet (2 mg) by mouth 3 times daily   Refills:  3        ZOLOFT PO   Dose:  25 mg        Take 25 mg by mouth daily   Refills:  0        * Notice:  This list has 2 medication(s) that are the same as other medications prescribed for you. Read the directions carefully, and ask your doctor or other care provider to review them with you.            Procedures and tests performed during your visit     Basic metabolic panel    CBC with platelets differential    Cervical spine CT w/o contrast    Head CT w/o contrast    UA with Microscopic reflex to Culture    Urine Culture    Urine Culture Aerobic Bacterial      Orders Needing Specimen Collection     None      Pending Results     Date and Time Order Name Status Description    11/17/2017 2323 Urine Culture Aerobic Bacterial In process     11/17/2017 2229 Cervical spine CT w/o contrast In process     11/17/2017 2228 Head CT w/o contrast In process             Pending Culture Results     Date and Time Order Name Status Description    11/17/2017 2323 Urine Culture Aerobic Bacterial In process             Thank you for choosing [facility]       Thank you for choosing [facility] for your care. Our goal is always to provide you with excellent care. Hearing back from our patients is one way we can continue to improve our services. Please take a few minutes to complete the written survey that you may receive in the mail after you visit with us. Thank you!        [employer] Information     [employer] lets you send messages to your doctor, view your test results, renew your prescriptions, schedule appointments and more. To sign up, go to [email or web address] . Click on \"Log in\" on the left side of the screen, which will " "take you to the Welcome page. Then click on \"Sign up Now\" on the right side of the page.     You will be asked to enter the access code listed below, as well as some personal information. Please follow the directions to create your username and password.     Your access code is: [email or web address]  Expires: 2018 12:59 AM     Your access code will  in 90 days. If you need help or a new code, please call your [facility] clinic or [phone].        Care EveryWhere ID     This is your Care EveryWhere ID. This could be used by other organizations to access your [facility] medical records  [id number]        Equal Access to Services     REGINE [place] : Ekaterina Mckenzie, jose garzon, kanu humphrey, melania mccain. So [facility] [phone].    ATENCIÓN: Si habla español, tiene a pittman disposición servicios gratuitos de asistencia lingüística. Llame al [phone].    We comply with applicable federal civil rights laws and [place] laws. We do not discriminate on the basis of race, color, national origin, age, disability, sex, sexual orientation, or gender identity.            After Visit Summary       This is your record. Keep this with you and show to your community pharmacist(s) and doctor(s) at your next visit.                  "

## 2017-11-18 VITALS
RESPIRATION RATE: 18 BRPM | DIASTOLIC BLOOD PRESSURE: 56 MMHG | OXYGEN SATURATION: 95 % | TEMPERATURE: 97.9 F | HEART RATE: 75 BPM | SYSTOLIC BLOOD PRESSURE: 130 MMHG

## 2017-11-18 LAB
BACTERIA SPEC CULT: NORMAL
BACTERIA SPEC CULT: NORMAL
SPECIMEN SOURCE: NORMAL

## 2017-11-18 PROCEDURE — 90715 TDAP VACCINE 7 YRS/> IM: CPT | Performed by: FAMILY MEDICINE

## 2017-11-18 PROCEDURE — 25000128 H RX IP 250 OP 636: Performed by: FAMILY MEDICINE

## 2017-11-18 RX ADMIN — CLOSTRIDIUM TETANI TOXOID ANTIGEN (FORMALDEHYDE INACTIVATED), CORYNEBACTERIUM DIPHTHERIAE TOXOID ANTIGEN (FORMALDEHYDE INACTIVATED), BORDETELLA PERTUSSIS TOXOID ANTIGEN (GLUTARALDEHYDE INACTIVATED), BORDETELLA PERTUSSIS FILAMENTOUS HEMAGGLUTININ ANTIGEN (FORMALDEHYDE INACTIVATED), BORDETELLA PERTUSSIS PERTACTIN ANTIGEN, AND BORDETELLA PERTUSSIS FIMBRIAE 2/3 ANTIGEN 0.5 ML: 5; 2; 2.5; 5; 3; 5 INJECTION, SUSPENSION INTRAMUSCULAR at 00:36

## 2017-11-18 ASSESSMENT — ANXIETY QUESTIONNAIRES: GAD7 TOTAL SCORE: 14

## 2017-11-18 NOTE — DISCHARGE INSTRUCTIONS
* LACERATION (All Closures)  A laceration is a cut through the skin. This will usually require stitches (sutures) or staples if it is deep. Minor cuts may be treated with a tape closure ( Steri-Strips ) or Dermabond skin glue.       HOME CARE:  PAIN MEDICINE: You may use acetaminophen (Tylenol) 650-1000 mg every 6 hours or ibuprofen (Motrin, Advil) 600 mg every 6-8 hours with food to control pain, if you are able to take these medicines. [NOTE: If you have chronic liver or kidney disease or ever had a stomach ulcer or GI bleeding, talk with your doctor before using these medicines.]  EXTREMITY, FACE or TRUNK WOUNDS:    Keep the wound clean and dry. If a bandage was applied and it becomes wet or dirty, replace it. Otherwise, leave it in place for the first 24 hours.    If stitches or staples were used, clean the wound daily. Protect the wound from sunlight and tanning lamps.    After removing the bandage, wash the area with soap and water. Use a wet cotton swab (Q tip) to loosen and remove any blood or crust that forms.    After cleaning, apply a thin layer of Polysporin or Bacitracin ointment. This will keep the wound clean and make it easier to remove the stitches or staples. Reapply a fresh bandage.    You may remove the bandage to shower as usual after the first 24 hours, but do not soak the area in water (no swimming) until the stitches or staples are removed.    If Steri-Strips were used, keep the area clean and dry. If it becomes wet, blot it dry with a towel. It is okay to take a brief shower, but avoid scrubbing the area.    If Dermabond skin adhesive was used, do not scratch, rub or pick at the adhesive film. Do not place tape directly over the film. Do not apply liquid, ointment or creams to the wound while the film is in place. Do not clean the wound with peroxide and do not apply ointments. Avoid activities that cause heavy sweating until the film has fallen off. Protect the wound from prolonged  exposure to sunlight or tanning lamps. You may shower as usual but do not soak the wound in water (no baths or swimming). The film will fall off by itself in 5-10 days.  SCALP WOUNDS: During the first two days, you may carefully rinse your hair in the shower to remove blood, glass or dirt particles. After two days, you may shower and shampoo your hair normally. Do not soak your scalp in the tub or go swimming until the stitches or staples have been removed.  MOUTH WOUNDS: Eat soft foods to reduce pain. If the cut is inside of your mouth, clean by rinsing after each meal and at bedtime with a mixture of equal parts water and Hydrogen Peroxide (do not swallow!). Or, you can use a cotton swab to directly apply Hydrogen Peroxide onto the cut.  After the wound is done healing, use sunscreen over the area whenever exposed for the next 6 minths to avoid a darker scar.     FOLLOW UP: Most skin wounds heal within ten days. Mouth and facial wounds heal within five days. However, even with proper treatment, a wound infection may sometimes occur. Therefore, you should check the wound daily for signs of infection listed below.  Stitches should be removed from the face within five days; stitches and staples should be removed from other parts of the body within 7-10 days. Unless you are told to come back to the emergency room, you may have your doctor or urgent care remove the stitches. If dissolving stitches were used in the mouth, these will fall out or dissolve without the need for removal. If tape closures ( Steri-Strips ) were used, remove them yourself if they have not fallen off after 7 days. If Dermabond skin glue was used, the film will fall off by itself in 5-10 days.      GET PROMPT MEDICAL ATTENTION  if any of the following occur:    Increasing pain in the wound    Redness, swelling or pus coming from the wound    Fever over 101 F (38.3 C) oral    If stitches or staples come apart or fall out or if Steri-Strips fall  off before seven days    If the wound edges re-open    Bleeding not controlled by direct pressure    0331-6834 [employer]. [address] [place] [address], [place] [address]. All rights reserved. This information is not intended as a substitute for professional medical care. Always follow your healthcare professional's instructions.  This information has been modified by your health care provider with permission from the publisher.

## 2017-11-18 NOTE — ED PROVIDER NOTES
"  History     Chief Complaint   Patient presents with     Fall     Fall from standing height, \"off balance\".     Head Injury     HPI  Tamera Kumar is a 77 year old female who presents for [facility] today when she lost her balance . She states that she struggles with balaance issues related to her medications and her physicians are considering prescribing her a walker. Patient had taken her sleeping medication at 0730 but had not gone to bed. No uti symptoms . States has had a uti in the past. NO irregular heart beat or chest pain . Patient states that she just lost her balance. Patient does not have good recollection of the event but she does have longstanding dementia. NO fever or recent illness . No weakness.     Problem List:    Patient Active Problem List    Diagnosis Date Noted     Estrella (H) 04/15/2013     Priority: High     Manic disorder, recurrent episode, moderate (H) 11/03/2015     Priority: Medium     Manic disorder, recurrent episode, in full remission (H) 11/03/2015     Priority: Medium     Behavior disturbance 10/05/2015     Priority: Medium     Bipolar 1 disorder (H) 05/07/2015     Priority: Medium     Sepsis (H) 01/10/2015     Priority: Medium     Fever 01/09/2015     Priority: Medium     Altered mental status 01/09/2015     Priority: Medium     Confusion 01/09/2015     Priority: Medium     Depressed 10/20/2014     Priority: Medium     Hypokalemia 10/20/2014     Priority: Medium     Bipolar 1 disorder, manic, moderate (H) 10/18/2014     Priority: Medium     Bipolar 1 disorder with moderate estrella (H) 09/24/2013     Priority: Medium     Hypothyroidism 04/15/2013     Priority: Medium     Hypertension 04/15/2013     Priority: Medium     CAD (coronary artery disease) 04/15/2013     Priority: Medium     GERD (gastroesophageal reflux disease) 04/15/2013     Priority: Medium     Bipolar affective disorder (H) 01/23/2004     Priority: Medium     Overview:   Complicated by PTSD.  History of histrionic, somatic, " and obsessive behavioral traits. Followed by Dr. Scherer, [facility] q 3mo.   H/o ECT therapy with memory impairment.           Past Medical History:    Past Medical History:   Diagnosis Date     Anemia      Bipolar disorder (H)      CAD in native artery      Constipation      GERD (gastroesophageal reflux disease)      H/O medication noncompliance      Histrionic personality disorder      HTN (hypertension)      Hypothyroid      Stress incontinence      Vitamin B 12 deficiency        Past Surgical History:    Past Surgical History:   Procedure Laterality Date     CARPAL TUNNEL RELEASE RT/LT       CATARACT IOL, RT/LT       HYSTERECTOMY         Family History:    Family History   Problem Relation Age of Onset     Family History Negative No family hx of        Social History:  Marital Status:   [5]  Social History   Substance Use Topics     Smoking status: Former Smoker     Packs/day: 0.25     Types: Cigarettes     Smokeless tobacco: Never Used     Alcohol use No        Medications:      Sertraline HCl (ZOLOFT PO)   polyethylene glycol (MIRALAX) Packet   TRAZODONE HCL PO   donepezil (ARICEPT) 10 MG tablet   gabapentin (NEURONTIN) 100 MG capsule   senna-docusate (SENOKOT-S;PERICOLACE) 8.6-50 MG per tablet   DULoxetine (CYMBALTA) 30 MG EC capsule   buPROPion (WELLBUTRIN XL) 300 MG 24 hr tablet   CLONIDINE HCL PO   levothyroxine (SYNTHROID/LEVOTHROID) 75 MCG tablet   traZODone (DESYREL) 100 MG tablet   Omeprazole (PRILOSEC PO)   amLODIPine (NORVASC) 5 MG tablet   memantine (NAMENDA) 10 MG tablet   multivitamin, therapeutic with minerals (THERA-VIT-M) TABS   cholecalciferol 5000 UNITS CAPS   trihexyphenidyl (ARTANE) 2 MG tablet   ACETAMINOPHEN PO         Review of Systems   Constitutional: Negative.  Negative for activity change, appetite change, chills, diaphoresis, fatigue, fever and unexpected weight change.   HENT: Negative.    Eyes: Negative.    Respiratory: Negative.    Cardiovascular: Negative.     Endocrine: Negative.    Genitourinary: Negative.    Skin: Negative.    Neurological: Positive for dizziness. Negative for tremors, seizures, syncope, facial asymmetry, speech difficulty, weakness, light-headedness, numbness and headaches.   Hematological: Negative.    Psychiatric/Behavioral: Negative.        Physical Exam   BP: 137/82  Pulse: 75  Heart Rate: 66  Temp: 98.4  F (36.9  C)  Resp: 20  SpO2: 95 %      Physical Exam   Constitutional: She is oriented to person, place, and time. She appears well-developed and well-nourished.   HENT:   Right Ear: External ear normal.   Left Ear: External ear normal.   2 cm linear laceration forehead with minimal bleeding ,    Eyes: Pupils are equal, round, and reactive to light.   Neck: Normal range of motion. Neck supple. No JVD present.   Cardiovascular: Normal rate and regular rhythm.    Pulmonary/Chest: No stridor.   Diminished breath sounds few rhonchi ,clear with cough .    Abdominal: Soft.   Lymphadenopathy:     She has no cervical adenopathy.   Neurological: She is alert and oriented to person, place, and time.   Skin:   2 cm linear laceration above left eyebrow    Nursing note and vitals reviewed.      ED Course     ED Course     Procedures        Patient arrived to ED ambulatory with left eyebrow laceration . Patient lost balance . ER work up with negative CT scan. Urinalysis not conclusive for UTI . Patient with mild anemia and renal insufficiency but review of medical records indicates chronic kidney disease. AFter verbal informed consent laceration is cleansed and locally anesthetized with 3 cc 1 % lidocaine . Laceration than repaired with 5  4'0 ethilon sutures  With good result . Patient discharged with instructions for suture removal      Results for orders placed or performed during the hospital encounter of 11/17/17   UA with Microscopic reflex to Culture   Result Value Ref Range    Color Urine Light Yellow     Appearance Urine Clear     Glucose Urine  Negative NEG^Negative mg/dL    Bilirubin Urine Negative NEG^Negative    Ketones Urine Negative NEG^Negative mg/dL    Specific Gravity Urine 1.004 1.003 - 1.035    Blood Urine Negative NEG^Negative    pH Urine 6.5 4.7 - 8.0 pH    Protein Albumin Urine 30 (A) NEG^Negative mg/dL    Urobilinogen mg/dL Normal 0.0 - 2.0 mg/dL    Nitrite Urine Negative NEG^Negative    Leukocyte Esterase Urine Moderate (A) NEG^Negative    Source Midstream Urine     WBC Urine 11 (H) 0 - 2 /HPF    RBC Urine 0 0 - 2 /HPF    Bacteria Urine None (A) NEG^Negative /HPF    Squamous Epithelial /HPF Urine 1 0 - 1 /HPF   Basic metabolic panel   Result Value Ref Range    Sodium 141 133 - 144 mmol/L    Potassium 4.2 3.4 - 5.3 mmol/L    Chloride 106 94 - 109 mmol/L    Carbon Dioxide 26 20 - 32 mmol/L    Anion Gap 9 3 - 14 mmol/L    Glucose 103 (H) 70 - 99 mg/dL    Urea Nitrogen 25 7 - 30 mg/dL    Creatinine 2.34 (H) 0.52 - 1.04 mg/dL    GFR Estimate 20 (L) >60 mL/min/1.7m2    GFR Estimate If Black 24 (L) >60 mL/min/1.7m2    Calcium 8.7 8.5 - 10.1 mg/dL   CBC with platelets differential   Result Value Ref Range    WBC 6.3 4.0 - 11.0 10e9/L    RBC Count 3.24 (L) 3.8 - 5.2 10e12/L    Hemoglobin 9.7 (L) 11.7 - 15.7 g/dL    Hematocrit 29.6 (L) 35.0 - 47.0 %    MCV 91 78 - 100 fl    MCH 29.9 26.5 - 33.0 pg    MCHC 32.8 31.5 - 36.5 g/dL    RDW 13.4 10.0 - 15.0 %    Platelet Count 261 150 - 450 10e9/L    Diff Method Automated Method     % Neutrophils 63.2 %    % Lymphocytes 25.9 %    % Monocytes 6.4 %    % Eosinophils 3.8 %    % Basophils 0.5 %    % Immature Granulocytes 0.2 %    Nucleated RBCs 0 0 /100    Absolute Neutrophil 4.0 1.6 - 8.3 10e9/L    Absolute Lymphocytes 1.6 0.8 - 5.3 10e9/L    Absolute Monocytes 0.4 0.0 - 1.3 10e9/L    Absolute Eosinophils 0.2 0.0 - 0.7 10e9/L    Absolute Basophils 0.0 0.0 - 0.2 10e9/L    Abs Immature Granulocytes 0.0 0 - 0.4 10e9/L    Absolute Nucleated RBC 0.0             Labs Ordered and Resulted from Time of ED Arrival Up to  the Time of Departure from the ED   BASIC METABOLIC PANEL - Abnormal; Notable for the following:        Result Value    Glucose 103 (*)     Creatinine 2.34 (*)     GFR Estimate 20 (*)     GFR Estimate If Black 24 (*)     All other components within normal limits   CBC WITH PLATELETS DIFFERENTIAL - Abnormal; Notable for the following:     RBC Count 3.24 (*)     Hemoglobin 9.7 (*)     Hematocrit 29.6 (*)     All other components within normal limits   ROUTINE UA WITH MICROSCOPIC REFLEX TO CULTURE       Assessments & Plan (with Medical Decision Making)     I have reviewed the nursing notes.    I have reviewed the findings, diagnosis, plan and need for follow up with the patient.      New Prescriptions    No medications on file     (S01.81XA) Facial laceration, initial encounter  Comment:   Plan: Routine suture cares. Suture removal in 7 days. Follow up with pcp for recheck hgb and bmp       N  11/17/2017   [facility]     Marisa Martinez MD  11/18/17 0134

## 2017-11-18 NOTE — ED NOTES
"Arrived via ambulance for\" having a fall from a standing position at [facility] in [place].  Was having some balance issues due to medications.\" Has a 2 cm laceration above left eyebrow.  Denies neck pain, \"has pain 3/10 in laceration area.\"  "

## 2017-11-19 LAB
BACTERIA SPEC CULT: ABNORMAL
SPECIMEN SOURCE: ABNORMAL

## 2017-11-25 NOTE — PROGRESS NOTES
[facility]   Psychiatric Progress Note    Subjective   This is a 77 year old female with significant mental health problems here accompanied by her [facility] worker. Since July, Tamera has displayed increased depression. She has become increasingly isolative not engaging in activities or conversation with others. This has been noticed by her , [facility] worker and others who she comes in contact with. Tamera also has TD which has worsened, she has never responded to cogentin, will try Artane. Discussed the new medication available and I will attempt to get samples.     Pts depression coincides with when she was started on zoloft, will slowly decrease and d/c zoloft.      10 point ROS negative other than what is noted in HPI       DIagnoses:    (F33.2) Severe episode of recurrent major depressive disorder, without psychotic features (H)  (primary encounter diagnosis)    (G24.01) Tardive dyskinesia    Attestation:  Patient has been seen and evaluated by me,  IAN Robles CNP          Interim History:   The patient's care was discussed with the treatment team and chart notes were reviewed.          Medications:     Current Outpatient Prescriptions on File Prior to Visit:  donepezil (ARICEPT) 10 MG tablet Take 1 tablet (10 mg) by mouth At Bedtime   gabapentin (NEURONTIN) 100 MG capsule Take 1 capsule (100 mg) by mouth 3 times daily   senna-docusate (SENOKOT-S;PERICOLACE) 8.6-50 MG per tablet Take 1 tablet by mouth daily   ACETAMINOPHEN PO Take 325 mg by mouth every 6 hours as needed for pain   DULoxetine (CYMBALTA) 30 MG EC capsule Take 1 capsule daily   buPROPion (WELLBUTRIN XL) 300 MG 24 hr tablet Take 1 tablet (300 mg) by mouth every morning   CLONIDINE HCL PO Take 0.1 mg by mouth 2 times daily   levothyroxine (SYNTHROID/LEVOTHROID) 75 MCG tablet Take 1 tablet (75 mcg) by mouth daily   traZODone (DESYREL) 100 MG tablet Take 1 tablet (100 mg) by mouth At Bedtime   Omeprazole (PRILOSEC PO) Take 20 mg  "by mouth every morning   amLODIPine (NORVASC) 5 MG tablet Take 1 tablet (5 mg) by mouth daily   memantine (NAMENDA) 10 MG tablet Take 1 tablet (10 mg) by mouth daily   multivitamin, therapeutic with minerals (THERA-VIT-M) TABS Take 1 tablet by mouth daily   cholecalciferol 5000 UNITS CAPS Take 1 capsule (5,000 Units) by mouth daily     No current facility-administered medications on file prior to visit.             Allergies:     Allergies   Allergen Reactions     Benadryl [Diphenhydramine] Unknown     Patient states she is allergic to benadryl     Depakote      Escitalopram      Suicidal ideation     Famotidine      Fluoxetine      Suicidal ideation     Hydrocodone      Lisinopril      swelling     Lithium Swelling     Other [Seasonal Allergies]      Pet hair       Penicillins Nausea     Ramelteon      Complains of orgasm feeling     Risperdal [Risperidone] Unknown     Patient states she is allergic to risperdal       Valproic Acid Unknown     \"seizures\"            Psychiatric Examination:   /50 (BP Location: Left arm, Patient Position: Chair, Cuff Size: Adult Regular)  Pulse 63  Temp 99.1  F (37.3  C) (Tympanic)  Ht 5' 1.5\" (1.562 m)  Wt 112 lb (50.8 kg)  SpO2 95%  BMI 20.82 kg/m2  Weight is 112 lbs 0 oz  Body mass index is 20.82 kg/(m^2).    Appearance:  awake, alert  Attitude:  cooperative  Eye Contact:  good  Mood:  sad   Affect:  appropriate and in normal range  Speech:  clear, coherent  Psychomotor Behavior:  evidence of tardive dyskinesia  Thought Process:  logical  Associations:  no loose associations  Thought Content:  no evidence of suicidal ideation or homicidal ideation  Insight:  fair  Judgment:  fair  Oriented to:  time, person, and place  Attention Span and Concentration:  fair  Recent and Remote Memory:  fair  Fund of Knowledge: appropriate  Muscle Strength and Tone: normal  Gait and Station: Normal  Perception: No perceptual disorder         Labs:   No results found for this or any " previous visit (from the past 24 hour(s)).          Assessment/ Plan:    (F33.2) Severe episode of recurrent major depressive disorder, without psychotic features (H)  (primary encounter diagnosis)  Comment: Decrease Zoloft 50 mg to 25 mg for 7 days then stop.    Plan: trihexyphenidyl (ARTANE) 2 MG tablet            1 po BID    (G24.01) Tardive dyskinesia             For all new medications pt was instructed on the medication, dose, route, action and potential side effects. Pt verbalized understanding.

## 2017-11-29 ENCOUNTER — OFFICE VISIT (OUTPATIENT)
Dept: PSYCHOLOGY | Facility: OTHER | Age: 77
End: 2017-11-29
Attending: PSYCHIATRY & NEUROLOGY
Payer: COMMERCIAL

## 2017-11-29 DIAGNOSIS — F31.30 BIPOLAR I DISORDER, MOST RECENT EPISODE DEPRESSED (H): Primary | ICD-10-CM

## 2017-11-29 PROCEDURE — 90837 PSYTX W PT 60 MINUTES: CPT | Performed by: SOCIAL WORKER

## 2017-11-29 ASSESSMENT — ANXIETY QUESTIONNAIRES
6. BECOMING EASILY ANNOYED OR IRRITABLE: SEVERAL DAYS
IF YOU CHECKED OFF ANY PROBLEMS ON THIS QUESTIONNAIRE, HOW DIFFICULT HAVE THESE PROBLEMS MADE IT FOR YOU TO DO YOUR WORK, TAKE CARE OF THINGS AT HOME, OR GET ALONG WITH OTHER PEOPLE: EXTREMELY DIFFICULT
1. FEELING NERVOUS, ANXIOUS, OR ON EDGE: NEARLY EVERY DAY
GAD7 TOTAL SCORE: 15
3. WORRYING TOO MUCH ABOUT DIFFERENT THINGS: NEARLY EVERY DAY
5. BEING SO RESTLESS THAT IT IS HARD TO SIT STILL: SEVERAL DAYS
2. NOT BEING ABLE TO STOP OR CONTROL WORRYING: NEARLY EVERY DAY
7. FEELING AFRAID AS IF SOMETHING AWFUL MIGHT HAPPEN: NEARLY EVERY DAY

## 2017-11-29 ASSESSMENT — PATIENT HEALTH QUESTIONNAIRE - PHQ9
SUM OF ALL RESPONSES TO PHQ QUESTIONS 1-9: 16
5. POOR APPETITE OR OVEREATING: SEVERAL DAYS

## 2017-11-29 NOTE — PROGRESS NOTES
Progress Note    Client Name: Tamera Kumar  Date: November 15, 2017         Service Type: Individual      Session Start Time: 10:00  Session End Time: 10:55      Session Length: 55 minutes     Session #: 12     Attendees: Client attended alone    DSM-V Diagnoses:   296.53 Bipolar I Disorder Current or Most Recent Episode Depressed, Severe    DA Date: 1/23/17    Treatment Plan Due: 1/25/18  PHQ-9 :   PHQ-9 SCORE 8/17/2017 9/15/2017 11/15/2017   Total Score 12 22 16      BRIGID-7 :    BRIGID-7 SCORE 9/15/2017 9/18/2017 11/17/2017   Total Score 15 17 14           DATA      Progress Since Last Session (Related to Symptoms / Goals / Homework):   Symptoms: slight improvements    Homework: Completed in session     Current / Ongoing Stressors and Concerns:  Tamera presented as overly anxious and restless today. She reported that her balance feels off when she is walking. Tamera discussed being overwhelmed and frustrated with the constant movement in her body. She does feel like some of this could be medication related. However, she explained that the constant movement is uncomfortable and increases her anxiety.     Treatment Objective(s) Addressed in This Session:   Objective 1A (Client Action)                  Client will learn IMR and CBT skills to use for reducing depressed mood.  Objective A (Client Action)                  Client will learn 5 relaxation skills.     Intervention:   Provided psycho-education utilizing CBT, cognitive restructuring and used Alpha Stim for anxiety reduction.     Response to Interventions:   Tamera stated she understood this information.      ASSESSMENT: Current Emotional / Mental Status (status of significant symptoms):   Risk status (Self / Other harm or suicidal ideation)   Client denies current fears or concerns for personal safety.   Client reports fleeting thoughts she would be better off dead, she denies any plan or intent.   Client denies  current or recent homicidal ideation or behaviors.   Client denies current or recent self injurious behavior or ideation.   Client denies other safety concerns.   A safety and risk management plan has not been developed at this time, however client was given the after-hours number / 911 should there be a change in any of these risk factors.     Appearance:   Appropriate    Eye Contact:   Fair    Psychomotor Behavior: Restless    Attitude:   Cooperative    Orientation:   All   Speech    Rate / Production: Normal     Volume:  Normal    Mood:    Anxious  Depressed    Affect:    Flat  Worrisome    Thought Content:  Rumination    Thought Form:  Coherent  Circumstantial   Insight:    Poor         Medication Compliance:   Yes     Changes in Health Issues:   None reported     Chemical Use Review:   Substance Use: Chemical use reviewed, no active concerns identified      Tobacco Use: No current tobacco use.       Collateral Reports Completed:   Not Applicable    PLAN: (Client Tasks / Therapist Tasks / Other)  Tamera was asked to practice the skills at home and to return for follow-up appointments.    CAROLINE SandersSW

## 2017-11-29 NOTE — MR AVS SNAPSHOT
"              After Visit Summary   11/29/2017    Tamera Kumar    MRN: [id number]           Patient Information     Date Of Birth          1940        Visit Information        Provider Department      11/29/2017 10:00 AM Fouzia Mason [facility]        Today's Diagnoses     Bipolar I disorder, most recent episode depressed (H)    -  1       Follow-ups after your visit        Your next 10 appointments already scheduled     Jan 05, 2018 10:30 AM CST   (Arrive by 10:15 AM)   New Visit with FOSTER Kent   [facility] ([facility] )    [address] [place] [id number] [phone]            Jan 12, 2018  1:30 PM CST   (Arrive by 1:15 PM)   Return Visit with IAN Redd [facility] ([facility] )    [address] [place] [id number] [phone]              Who to contact     If you have questions or need follow up information about today's clinic visit or your schedule please contact [facility] directly at [phone].  Normal or non-critical lab and imaging results will be communicated to you by MyChart, letter or phone within 4 business days after the clinic has received the results. If you do not hear from us within 7 days, please contact the clinic through MyChart or phone. If you have a critical or abnormal lab result, we will notify you by phone as soon as possible.  Submit refill requests through [employer] [facility] [employer] or call your pharmacy and they will forward the refill request to us. Please allow 3 business days for your refill to be completed.          Additional Information About Your Visit        MyChart Information     [employer] [facility] [employer] lets you send messages to your doctor, view your test results, renew your prescriptions, schedule appointments and more. To sign up, go to [email or web address] . Click on \"Log in\" on the left side of the screen, which will take you to the " "Welcome page. Then click on \"Sign up Now\" on the right side of the page.     You will be asked to enter the access code listed below, as well as some personal information. Please follow the directions to create your username and password.     Your access code is: [email or web address]  Expires: 2018 12:59 AM     Your access code will  in 90 days. If you need help or a new code, please call your [place] clinic or [phone].        Care EveryWhere ID     This is your Care EveryWhere ID. This could be used by other organizations to access your [place] medical records  [id number]         Blood Pressure from Last 3 Encounters:   17 130/56   17 110/50   09/15/17 116/60    Weight from Last 3 Encounters:   17 112 lb (50.8 kg)   09/15/17 114 lb (51.7 kg)   17 108 lb (49 kg)              Today, you had the following     No orders found for display       Primary Care Provider Office Phone # Fax #    Keith Lemons [phone]       [facility] [address] [facility] [address]        Equal Access to Services     [facility]I [facility] : Hadii michael kelly haddeloreso Sosera, waaxda luqadaha, qaybta kaalmada adeegyada, melania crook . So [facility] [phone].    ATENCIÓN: Si habla español, tiene a pittman disposición servicios gratuitos de asistencia lingüística. Llame al [phone].    We comply with applicable federal civil rights laws and [place] laws. We do not discriminate on the basis of race, color, national origin, age, disability, sex, sexual orientation, or gender identity.            Thank you!     Thank you for choosing [facility]  for your care. Our goal is always to provide you with excellent care. Hearing back from our patients is one way we can continue to improve our services. Please take a few minutes to complete the written survey that you may receive in the mail after your visit with us. Thank you!             Your Updated Medication " List - Protect others around you: Learn how to safely use, store and throw away your medicines at www.disposemymeds.org.          This list is accurate as of: 11/29/17 11:59 PM.  Always use your most recent med list.                   Brand Name Dispense Instructions for use Diagnosis    ACETAMINOPHEN PO      Take 325 mg by mouth every 6 hours as needed for pain        amLODIPine 5 MG tablet    NORVASC    30 tablet    Take 1 tablet (5 mg) by mouth daily    Essential hypertension       cholecalciferol 5000 UNITS Caps     30 capsule    Take 1 capsule (5,000 Units) by mouth daily    Bipolar 1 disorder (H)       CLONIDINE HCL PO      Take 0.1 mg by mouth 2 times daily        donepezil 10 MG tablet    ARICEPT    30 tablet    Take 1 tablet (10 mg) by mouth At Bedtime    Vascular dementia with behavior disturbance       DULoxetine 30 MG EC capsule    CYMBALTA    90 capsule    Take 1 capsule daily    Recurrent major depressive disorder, in partial remission (H)       gabapentin 100 MG capsule    NEURONTIN    90 capsule    Take 1 capsule (100 mg) by mouth 3 times daily    Anxiety       levothyroxine 75 MCG tablet    SYNTHROID/LEVOTHROID    30 tablet    Take 1 tablet (75 mcg) by mouth daily    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 substances       memantine 10 MG tablet    NAMENDA    30 tablet    Take 1 tablet (10 mg) by mouth daily    Vascular dementia with behavior disturbance       MIRALAX Packet   Generic drug:  polyethylene glycol      Take 17 g by mouth daily        multivitamin, therapeutic with minerals Tabs tablet     30 each    Take 1 tablet by mouth daily    Bipolar 1 disorder (H)       PRILOSEC PO      Take 20 mg by mouth every morning        senna-docusate 8.6-50 MG per tablet    SENOKOT-S;PERICOLACE     Take 1 tablet by mouth daily        * TRAZODONE HCL PO      Take 100 mg by mouth nightly as needed for sleep        * traZODone 100 MG tablet    DESYREL    30 tablet    Take 1 tablet (100 mg) by mouth At  Bedtime    Recurrent major depressive disorder, in partial remission (H)       trihexyphenidyl 2 MG tablet    ARTANE    90 tablet    Take 1 tablet (2 mg) by mouth 3 times daily    Severe episode of recurrent major depressive disorder, without psychotic features (H)       ZOLOFT PO      Take 25 mg by mouth daily        * Notice:  This list has 2 medication(s) that are the same as other medications prescribed for you. Read the directions carefully, and ask your doctor or other care provider to review them with you.

## 2017-12-20 NOTE — TELEPHONE ENCOUNTER
buPROPion (WELLBUTRIN XL) 300 MG 24 hr tablet    Last Written Prescription Date: 09/15/2017  Last Fill Quantity: 30,  # refills: 3   Last Office Visit with [facility], [facility] or [facility] prescribing provider: 11/17/2017                                         Next 5 appointments (look out 90 days)     Dec 21, 2017  2:00 PM CST   (Arrive by 1:45 PM)   Return Visit with Fouzia Mason [facility] ([facility] )    [address] [place], [place] [address] [place] [id number] [phone]            Jan 12, 2018  1:30 PM CST   (Arrive by 1:15 PM)   Return Visit with IAN Redd [facility] ([facility] )    [address] [place] [id number] [phone]

## 2017-12-21 NOTE — MR AVS SNAPSHOT
"              After Visit Summary   12/21/2017    Tamera Kumar    MRN: [id number]           Patient Information     Date Of Birth          1940        Visit Information        Provider Department      12/21/2017 2:00 PM Fouzia Mason [facility]        Today's Diagnoses     Bipolar I disorder, most recent episode depressed (H)    -  1       Follow-ups after your visit        Your next 10 appointments already scheduled     Jan 05, 2018 10:30 AM CST   (Arrive by 10:15 AM)   New Visit with FOSTER Kent   [facility] ([facility] )    [address] [place] [id number] [phone]            Jan 12, 2018  1:30 PM CST   (Arrive by 1:15 PM)   Return Visit with IAN Redd [facility] ([facility] )    [address] [place] [address] [phone]              Who to contact     If you have questions or need follow up information about today's clinic visit or your schedule please contact [facility] directly at [phone].  Normal or non-critical lab and imaging results will be communicated to you by MyChart, letter or phone within 4 business days after the clinic has received the results. If you do not hear from us within 7 days, please contact the clinic through MyChart or phone. If you have a critical or abnormal lab result, we will notify you by phone as soon as possible.  Submit refill requests through [employer] or call your pharmacy and they will forward the refill request to us. Please allow 3 business days for your refill to be completed.          Additional Information About Your Visit        MyChart Information     [employer] lets you send messages to your doctor, view your test results, renew your prescriptions, schedule appointments and more. To sign up, go to [email or web address] . Click on \"Log in\" on the left side of the screen, which will take you to the " "Welcome page. Then click on \"Sign up Now\" on the right side of the page.     You will be asked to enter the access code listed below, as well as some personal information. Please follow the directions to create your username and password.     Your access code is: [email or web address]  Expires: 2018 12:59 AM     Your access code will  in 90 days. If you need help or a new code, please call your [place] clinic or [phone].        Care EveryWhere ID     This is your Care EveryWhere ID. This could be used by other organizations to access your [place] medical records  [id number]         Blood Pressure from Last 3 Encounters:   17 130/56   17 110/50   09/15/17 116/60    Weight from Last 3 Encounters:   17 112 lb (50.8 kg)   09/15/17 114 lb (51.7 kg)   17 108 lb (49 kg)              Today, you had the following     No orders found for display       Primary Care Provider Office Phone # Fax #    Keith Lemons [phone]       [facility] [address] [facility] [address]        Equal Access to Services     [facility] : Hadii michael kelly haddeloreso Sosera, waaxda luqadaha, qaybta kaalmada adeegyada, melania crook . So [facility] [phone].    ATENCIÓN: Si habla español, tiene a pittman disposición servicios gratuitos de asistencia lingüística. Llame al [phone].    We comply with applicable federal civil rights laws and [place] laws. We do not discriminate on the basis of race, color, national origin, age, disability, sex, sexual orientation, or gender identity.            Thank you!     Thank you for choosing [facility]  for your care. Our goal is always to provide you with excellent care. Hearing back from our patients is one way we can continue to improve our services. Please take a few minutes to complete the written survey that you may receive in the mail after your visit with us. Thank you!             Your Updated Medication " List - Protect others around you: Learn how to safely use, store and throw away your medicines at www.disposemymeds.org.          This list is accurate as of: 12/21/17 11:59 PM.  Always use your most recent med list.                   Brand Name Dispense Instructions for use Diagnosis    ACETAMINOPHEN PO      Take 325 mg by mouth every 6 hours as needed for pain        amLODIPine 5 MG tablet    NORVASC    30 tablet    Take 1 tablet (5 mg) by mouth daily    Essential hypertension       buPROPion 300 MG 24 hr tablet    WELLBUTRIN XL    30 tablet    take one tablet by mouth every morning.    Severe episode of recurrent major depressive disorder, without psychotic features (H)       cholecalciferol 5000 UNITS Caps     30 capsule    Take 1 capsule (5,000 Units) by mouth daily    Bipolar 1 disorder (H)       CLONIDINE HCL PO      Take 0.1 mg by mouth 2 times daily        donepezil 10 MG tablet    ARICEPT    30 tablet    Take 1 tablet (10 mg) by mouth At Bedtime    Vascular dementia with behavior disturbance       DULoxetine 30 MG EC capsule    CYMBALTA    90 capsule    Take 1 capsule daily    Recurrent major depressive disorder, in partial remission (H)       gabapentin 100 MG capsule    NEURONTIN    90 capsule    Take 1 capsule (100 mg) by mouth 3 times daily    Anxiety       levothyroxine 75 MCG tablet    SYNTHROID/LEVOTHROID    30 tablet    Take 1 tablet (75 mcg) by mouth daily    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 substances       memantine 10 MG tablet    NAMENDA    30 tablet    Take 1 tablet (10 mg) by mouth daily    Vascular dementia with behavior disturbance       MIRALAX Packet   Generic drug:  polyethylene glycol      Take 17 g by mouth daily        multivitamin, therapeutic with minerals Tabs tablet     30 each    Take 1 tablet by mouth daily    Bipolar 1 disorder (H)       PRILOSEC PO      Take 20 mg by mouth every morning        senna-docusate 8.6-50 MG per tablet    SENOKOT-S;PERICOLACE     Take  1 tablet by mouth daily        * TRAZODONE HCL PO      Take 100 mg by mouth nightly as needed for sleep        * traZODone 100 MG tablet    DESYREL    30 tablet    Take 1 tablet (100 mg) by mouth At Bedtime    Recurrent major depressive disorder, in partial remission (H)       trihexyphenidyl 2 MG tablet    ARTANE    90 tablet    Take 1 tablet (2 mg) by mouth 3 times daily    Severe episode of recurrent major depressive disorder, without psychotic features (H)       ZOLOFT PO      Take 25 mg by mouth daily        * Notice:  This list has 2 medication(s) that are the same as other medications prescribed for you. Read the directions carefully, and ask your doctor or other care provider to review them with you.

## 2017-12-30 NOTE — PROGRESS NOTES
Progress Note    Client Name: Tamera Kumar  Date: November 29, 2017         Service Type: Individual      Session Start Time: 9:54  Session End Time: 10:54      Session Length: 60 minutes     Session #: 13     Attendees: Client attended alone    DSM-V Diagnoses:   296.53 Bipolar I Disorder Current or Most Recent Episode Depressed, Severe    DA Date: 1/23/17    Treatment Plan Due: 1/25/18  PHQ-9 :   PHQ-9 SCORE 9/15/2017 11/15/2017 11/29/2017   Total Score 22 16 22      BRIGID-7 :    BRIGID-7 SCORE 9/18/2017 11/17/2017 11/29/2017   Total Score 17 14 15           DATA      Progress Since Last Session (Related to Symptoms / Goals / Homework):   Symptoms: increased symptoms    Homework: Completed in session     Current / Ongoing Stressors and Concerns:  Tamera continues to present as anxious and restless. She explained that she has been experiencing increased anxiety and increased negative thoughts. Tamera reports that she has not been eating well because her dentures don't fit right and it hurts her mouth.      Treatment Objective(s) Addressed in This Session:   Objective 1A (Client Action)                  Client will learn IMR and CBT skills to use for reducing depressed mood.  Objective A (Client Action)                  Client will learn 5 relaxation skills.     Intervention:   Provided psycho-education utilizing CBT, continuing work on cognitive restructuring and used Alpha Stim for anxiety reduction.     Response to Interventions:   Tamera stated she understood this information.      ASSESSMENT: Current Emotional / Mental Status (status of significant symptoms):   Risk status (Self / Other harm or suicidal ideation)   Client denies current fears or concerns for personal safety.   Client reports fleeting thoughts she would be better off dead, she denies any plan or intent.   Client denies current or recent homicidal ideation or behaviors.   Client denies current or recent  self injurious behavior or ideation.   Client denies other safety concerns.   A safety and risk management plan has not been developed at this time, however client was given the after-hours number / 911 should there be a change in any of these risk factors.     Appearance:   Appropriate    Eye Contact:   Fair    Psychomotor Behavior: Restless    Attitude:   Cooperative    Orientation:   All   Speech    Rate / Production: Normal     Volume:  Normal    Mood:    Anxious  Depressed    Affect:    Flat  Worrisome    Thought Content:  Rumination    Thought Form:  Coherent  Circumstantial   Insight:    Poor         Medication Compliance:   Yes     Changes in Health Issues:   None reported     Chemical Use Review:   Substance Use: Chemical use reviewed, no active concerns identified      Tobacco Use: No current tobacco use.       Collateral Reports Completed:   Not Applicable    PLAN: (Client Tasks / Therapist Tasks / Other)  Tamera was asked to practice the skills at home and to return for follow-up appointments.    CAROLINE SandersSW

## 2018-01-01 ENCOUNTER — OFFICE VISIT (OUTPATIENT)
Dept: PSYCHIATRY | Facility: OTHER | Age: 78
End: 2018-01-01
Attending: NURSE PRACTITIONER
Payer: COMMERCIAL

## 2018-01-01 ENCOUNTER — OFFICE VISIT (OUTPATIENT)
Dept: PSYCHOLOGY | Facility: OTHER | Age: 78
End: 2018-01-01
Attending: MARRIAGE & FAMILY THERAPIST
Payer: COMMERCIAL

## 2018-01-01 ENCOUNTER — TELEPHONE (OUTPATIENT)
Dept: PSYCHIATRY | Facility: OTHER | Age: 78
End: 2018-01-01

## 2018-01-01 ENCOUNTER — HOSPITAL ENCOUNTER (INPATIENT)
Facility: HOSPITAL | Age: 78
LOS: 5 days | Discharge: NURSING, REHAB OR LONG-TERM CARE | DRG: 885 | End: 2018-02-13
Attending: PHYSICIAN ASSISTANT | Admitting: PSYCHIATRY & NEUROLOGY
Payer: COMMERCIAL

## 2018-01-01 ENCOUNTER — HOSPITAL ENCOUNTER (EMERGENCY)
Facility: HOSPITAL | Age: 78
Discharge: HOME | End: 2018-01-23
Attending: FAMILY MEDICINE | Admitting: FAMILY MEDICINE
Payer: COMMERCIAL

## 2018-01-01 ENCOUNTER — TRANSFERRED RECORDS (OUTPATIENT)
Dept: HEALTH INFORMATION MANAGEMENT | Facility: CLINIC | Age: 78
End: 2018-01-01

## 2018-01-01 VITALS
BODY MASS INDEX: 20.63 KG/M2 | TEMPERATURE: 97.9 F | OXYGEN SATURATION: 96 % | HEART RATE: 73 BPM | WEIGHT: 111 LBS | DIASTOLIC BLOOD PRESSURE: 60 MMHG | SYSTOLIC BLOOD PRESSURE: 120 MMHG

## 2018-01-01 VITALS
HEIGHT: 60 IN | SYSTOLIC BLOOD PRESSURE: 112 MMHG | WEIGHT: 112 LBS | BODY MASS INDEX: 21.99 KG/M2 | HEART RATE: 76 BPM | DIASTOLIC BLOOD PRESSURE: 62 MMHG

## 2018-01-01 VITALS
DIASTOLIC BLOOD PRESSURE: 105 MMHG | BODY MASS INDEX: 19.89 KG/M2 | OXYGEN SATURATION: 100 % | RESPIRATION RATE: 16 BRPM | HEART RATE: 83 BPM | TEMPERATURE: 98.1 F | WEIGHT: 107 LBS | SYSTOLIC BLOOD PRESSURE: 143 MMHG

## 2018-01-01 VITALS
WEIGHT: 105 LBS | BODY MASS INDEX: 19.32 KG/M2 | DIASTOLIC BLOOD PRESSURE: 94 MMHG | SYSTOLIC BLOOD PRESSURE: 147 MMHG | RESPIRATION RATE: 18 BRPM | OXYGEN SATURATION: 97 % | HEIGHT: 62 IN | TEMPERATURE: 98 F | HEART RATE: 64 BPM

## 2018-01-01 DIAGNOSIS — D64.9 LOW HEMOGLOBIN: ICD-10-CM

## 2018-01-01 DIAGNOSIS — R30.0 DYSURIA: Primary | ICD-10-CM

## 2018-01-01 DIAGNOSIS — F31.9 BIPOLAR 1 DISORDER (H): ICD-10-CM

## 2018-01-01 DIAGNOSIS — F31.30 BIPOLAR I DISORDER, MOST RECENT EPISODE DEPRESSED (H): Primary | ICD-10-CM

## 2018-01-01 DIAGNOSIS — F32.A ANXIETY AND DEPRESSION: Primary | ICD-10-CM

## 2018-01-01 DIAGNOSIS — G24.01 TARDIVE DYSKINESIA: Primary | ICD-10-CM

## 2018-01-01 DIAGNOSIS — F33.41 RECURRENT MAJOR DEPRESSIVE DISORDER, IN PARTIAL REMISSION (H): ICD-10-CM

## 2018-01-01 DIAGNOSIS — E86.0 DEHYDRATION: ICD-10-CM

## 2018-01-01 DIAGNOSIS — R45.851 SUICIDAL IDEATION: ICD-10-CM

## 2018-01-01 DIAGNOSIS — R45.850 HOMICIDAL IDEATION: ICD-10-CM

## 2018-01-01 DIAGNOSIS — J04.0 ACUTE LARYNGITIS: ICD-10-CM

## 2018-01-01 DIAGNOSIS — F31.10 BIPOLAR I DISORDER, MOST RECENT EPISODE (OR CURRENT) MANIC (H): ICD-10-CM

## 2018-01-01 DIAGNOSIS — N17.9 ACUTE RENAL FAILURE SUPERIMPOSED ON CHRONIC KIDNEY DISEASE, UNSPECIFIED CKD STAGE, UNSPECIFIED ACUTE RENAL FAILURE TYPE: ICD-10-CM

## 2018-01-01 DIAGNOSIS — R11.0 NAUSEA: ICD-10-CM

## 2018-01-01 DIAGNOSIS — F02.80 DEMENTIA ASSOCIATED WITH OTHER UNDERLYING DISEASE WITHOUT BEHAVIORAL DISTURBANCE (H): Primary | ICD-10-CM

## 2018-01-01 DIAGNOSIS — Z79.899 HIGH RISK MEDICATION USE: ICD-10-CM

## 2018-01-01 DIAGNOSIS — F41.9 ANXIETY AND DEPRESSION: Primary | ICD-10-CM

## 2018-01-01 DIAGNOSIS — F33.2 SEVERE EPISODE OF RECURRENT MAJOR DEPRESSIVE DISORDER, WITHOUT PSYCHOTIC FEATURES (H): ICD-10-CM

## 2018-01-01 DIAGNOSIS — N18.9 ACUTE RENAL FAILURE SUPERIMPOSED ON CHRONIC KIDNEY DISEASE, UNSPECIFIED CKD STAGE, UNSPECIFIED ACUTE RENAL FAILURE TYPE: ICD-10-CM

## 2018-01-01 LAB
ALBUMIN SERPL-MCNC: 3.4 G/DL (ref 3.4–5)
ALBUMIN SERPL-MCNC: 4 G/DL (ref 3.4–5)
ALBUMIN UR-MCNC: 100 MG/DL
ALBUMIN UR-MCNC: 30 MG/DL
ALBUMIN UR-MCNC: 30 MG/DL
ALP SERPL-CCNC: 130 U/L (ref 40–150)
ALP SERPL-CCNC: 141 U/L (ref 40–150)
ALT SERPL W P-5'-P-CCNC: 10 U/L (ref 0–50)
ALT SERPL W P-5'-P-CCNC: 57 U/L (ref 0–50)
AMPHETAMINES UR QL SCN: NEGATIVE
ANION GAP SERPL CALCULATED.3IONS-SCNC: 14 MMOL/L (ref 3–14)
APAP SERPL-MCNC: <2 MG/L (ref 10–20)
APPEARANCE UR: CLEAR
AST SERPL W P-5'-P-CCNC: 104 U/L (ref 0–45)
AST SERPL W P-5'-P-CCNC: 12 U/L (ref 0–45)
BACTERIA #/AREA URNS HPF: ABNORMAL /HPF
BACTERIA #/AREA URNS HPF: ABNORMAL /HPF
BACTERIA SPEC CULT: ABNORMAL
BACTERIA SPEC CULT: ABNORMAL
BARBITURATES UR QL: NEGATIVE
BASOPHILS # BLD AUTO: 0 10E9/L (ref 0–0.2)
BASOPHILS # BLD AUTO: 0.1 10E9/L (ref 0–0.2)
BASOPHILS NFR BLD AUTO: 0.5 %
BASOPHILS NFR BLD AUTO: 0.8 %
BENZODIAZ UR QL: NEGATIVE
BILIRUB DIRECT SERPL-MCNC: <0.1 MG/DL (ref 0–0.2)
BILIRUB SERPL-MCNC: 0.2 MG/DL (ref 0.2–1.3)
BILIRUB SERPL-MCNC: 0.5 MG/DL (ref 0.2–1.3)
BILIRUB UR QL STRIP: NEGATIVE
BUN SERPL-MCNC: 36 MG/DL (ref 7–30)
BUN SERPL-MCNC: 39 MG/DL (ref 7–30)
CALCIUM SERPL-MCNC: 9.2 MG/DL (ref 8.5–10.1)
CANNABINOIDS UR QL SCN: NEGATIVE
CHLORIDE SERPL-SCNC: 107 MMOL/L (ref 94–109)
CO2 SERPL-SCNC: 22 MMOL/L (ref 20–32)
COCAINE UR QL: NEGATIVE
COLOR UR AUTO: ABNORMAL
COLOR UR AUTO: ABNORMAL
COLOR UR AUTO: YELLOW
CREAT SERPL-MCNC: 2.3 MG/DL (ref 0.52–1.04)
CREAT SERPL-MCNC: 2.66 MG/DL (ref 0.52–1.04)
DIFFERENTIAL METHOD BLD: ABNORMAL
DIFFERENTIAL METHOD BLD: ABNORMAL
EOSINOPHIL # BLD AUTO: 0 10E9/L (ref 0–0.7)
EOSINOPHIL # BLD AUTO: 1.1 10E9/L (ref 0–0.7)
EOSINOPHIL NFR BLD AUTO: 0 %
EOSINOPHIL NFR BLD AUTO: 14.7 %
ERYTHROCYTE [DISTWIDTH] IN BLOOD BY AUTOMATED COUNT: 14.1 % (ref 10–15)
ERYTHROCYTE [DISTWIDTH] IN BLOOD BY AUTOMATED COUNT: 14.1 % (ref 10–15)
ETHANOL SERPL-MCNC: <0.01 G/DL
GFR SERPL CREATININE-BSD FRML MDRD: 17 ML/MIN/1.7M2
GFR SERPL CREATININE-BSD FRML MDRD: 21 ML/MIN/1.7M2
GLUCOSE SERPL-MCNC: 106 MG/DL (ref 70–99)
GLUCOSE UR STRIP-MCNC: NEGATIVE MG/DL
HCT VFR BLD AUTO: 33.2 % (ref 35–47)
HCT VFR BLD AUTO: 34.6 % (ref 35–47)
HGB BLD-MCNC: 10.8 G/DL (ref 11.7–15.7)
HGB BLD-MCNC: 10.8 G/DL (ref 11.7–15.7)
HGB UR QL STRIP: ABNORMAL
HGB UR QL STRIP: NEGATIVE
HGB UR QL STRIP: NEGATIVE
IMM GRANULOCYTES # BLD: 0 10E9/L (ref 0–0.4)
IMM GRANULOCYTES # BLD: 0 10E9/L (ref 0–0.4)
IMM GRANULOCYTES NFR BLD: 0.1 %
IMM GRANULOCYTES NFR BLD: 0.2 %
KETONES UR STRIP-MCNC: 10 MG/DL
KETONES UR STRIP-MCNC: NEGATIVE MG/DL
KETONES UR STRIP-MCNC: NEGATIVE MG/DL
LEUKOCYTE ESTERASE UR QL STRIP: ABNORMAL
LYMPHOCYTES # BLD AUTO: 0.9 10E9/L (ref 0.8–5.3)
LYMPHOCYTES # BLD AUTO: 1.4 10E9/L (ref 0.8–5.3)
LYMPHOCYTES NFR BLD AUTO: 10.6 %
LYMPHOCYTES NFR BLD AUTO: 18.6 %
MCH RBC QN AUTO: 29.2 PG (ref 26.5–33)
MCH RBC QN AUTO: 30.3 PG (ref 26.5–33)
MCHC RBC AUTO-ENTMCNC: 31.2 G/DL (ref 31.5–36.5)
MCHC RBC AUTO-ENTMCNC: 32.5 G/DL (ref 31.5–36.5)
MCV RBC AUTO: 93 FL (ref 78–100)
MCV RBC AUTO: 94 FL (ref 78–100)
METHADONE UR QL SCN: NEGATIVE
MONOCYTES # BLD AUTO: 0.3 10E9/L (ref 0–1.3)
MONOCYTES # BLD AUTO: 0.5 10E9/L (ref 0–1.3)
MONOCYTES NFR BLD AUTO: 4.6 %
MONOCYTES NFR BLD AUTO: 5.3 %
MUCOUS THREADS #/AREA URNS LPF: PRESENT /LPF
NEUTROPHILS # BLD AUTO: 4.5 10E9/L (ref 1.6–8.3)
NEUTROPHILS # BLD AUTO: 7.3 10E9/L (ref 1.6–8.3)
NEUTROPHILS NFR BLD AUTO: 61.2 %
NEUTROPHILS NFR BLD AUTO: 83.4 %
NITRATE UR QL: NEGATIVE
NRBC # BLD AUTO: 0 10*3/UL
NRBC # BLD AUTO: 0 10*3/UL
NRBC BLD AUTO-RTO: 0 /100
NRBC BLD AUTO-RTO: 0 /100
OPIATES UR QL SCN: NEGATIVE
PCP UR QL SCN: NEGATIVE
PH UR STRIP: 5.5 PH (ref 4.7–8)
PH UR STRIP: 6 PH (ref 4.7–8)
PH UR STRIP: 6 PH (ref 4.7–8)
PLATELET # BLD AUTO: 228 10E9/L (ref 150–450)
PLATELET # BLD AUTO: 307 10E9/L (ref 150–450)
POTASSIUM SERPL-SCNC: 4.5 MMOL/L (ref 3.4–5.3)
PROT SERPL-MCNC: 7.4 G/DL (ref 6.8–8.8)
PROT SERPL-MCNC: 7.8 G/DL (ref 6.8–8.8)
RBC # BLD AUTO: 3.57 10E12/L (ref 3.8–5.2)
RBC # BLD AUTO: 3.7 10E12/L (ref 3.8–5.2)
RBC #/AREA URNS AUTO: 0 /HPF (ref 0–2)
RBC #/AREA URNS AUTO: 1 /HPF (ref 0–2)
RBC #/AREA URNS AUTO: 2 /HPF (ref 0–2)
SALICYLATES SERPL-MCNC: <2 MG/DL
SODIUM SERPL-SCNC: 143 MMOL/L (ref 133–144)
SOURCE: ABNORMAL
SP GR UR STRIP: 1.01 (ref 1–1.03)
SPECIMEN SOURCE: ABNORMAL
SPECIMEN SOURCE: ABNORMAL
SQUAMOUS #/AREA URNS AUTO: 2 /HPF (ref 0–1)
SQUAMOUS #/AREA URNS AUTO: 2 /HPF (ref 0–1)
SQUAMOUS #/AREA URNS AUTO: 8 /HPF (ref 0–1)
TSH SERPL DL<=0.005 MIU/L-ACNC: 0.92 MU/L (ref 0.4–4)
UROBILINOGEN UR STRIP-MCNC: NORMAL MG/DL (ref 0–2)
VALPROATE SERPL-MCNC: 26 MG/L (ref 50–100)
WBC # BLD AUTO: 7.4 10E9/L (ref 4–11)
WBC # BLD AUTO: 8.7 10E9/L (ref 4–11)
WBC #/AREA URNS AUTO: 15 /HPF (ref 0–2)
WBC #/AREA URNS AUTO: 29 /HPF (ref 0–5)
WBC #/AREA URNS AUTO: 8 /HPF (ref 0–2)

## 2018-01-01 PROCEDURE — 99214 OFFICE O/P EST MOD 30 MIN: CPT | Performed by: NURSE PRACTITIONER

## 2018-01-01 PROCEDURE — 25000132 ZZH RX MED GY IP 250 OP 250 PS 637: Performed by: NURSE PRACTITIONER

## 2018-01-01 PROCEDURE — 80329 ANALGESICS NON-OPIOID 1 OR 2: CPT | Performed by: PHYSICIAN ASSISTANT

## 2018-01-01 PROCEDURE — 85025 COMPLETE CBC W/AUTO DIFF WBC: CPT | Mod: ZL | Performed by: NURSE PRACTITIONER

## 2018-01-01 PROCEDURE — 99239 HOSP IP/OBS DSCHRG MGMT >30: CPT | Performed by: NURSE PRACTITIONER

## 2018-01-01 PROCEDURE — 12400000 ZZH R&B MH

## 2018-01-01 PROCEDURE — 90837 PSYTX W PT 60 MINUTES: CPT | Performed by: MARRIAGE & FAMILY THERAPIST

## 2018-01-01 PROCEDURE — 99284 EMERGENCY DEPT VISIT MOD MDM: CPT | Performed by: PHYSICIAN ASSISTANT

## 2018-01-01 PROCEDURE — 84443 ASSAY THYROID STIM HORMONE: CPT | Performed by: PHYSICIAN ASSISTANT

## 2018-01-01 PROCEDURE — 96360 HYDRATION IV INFUSION INIT: CPT

## 2018-01-01 PROCEDURE — 36415 COLL VENOUS BLD VENIPUNCTURE: CPT | Mod: ZL | Performed by: NURSE PRACTITIONER

## 2018-01-01 PROCEDURE — 80307 DRUG TEST PRSMV CHEM ANLYZR: CPT | Performed by: PHYSICIAN ASSISTANT

## 2018-01-01 PROCEDURE — 87086 URINE CULTURE/COLONY COUNT: CPT | Mod: ZL | Performed by: NURSE PRACTITIONER

## 2018-01-01 PROCEDURE — 80164 ASSAY DIPROPYLACETIC ACD TOT: CPT | Mod: ZL | Performed by: NURSE PRACTITIONER

## 2018-01-01 PROCEDURE — 99282 EMERGENCY DEPT VISIT SF MDM: CPT | Performed by: FAMILY MEDICINE

## 2018-01-01 PROCEDURE — 80320 DRUG SCREEN QUANTALCOHOLS: CPT | Performed by: PHYSICIAN ASSISTANT

## 2018-01-01 PROCEDURE — 99282 EMERGENCY DEPT VISIT SF MDM: CPT

## 2018-01-01 PROCEDURE — 25000128 H RX IP 250 OP 636: Performed by: PHYSICIAN ASSISTANT

## 2018-01-01 PROCEDURE — 99233 SBSQ HOSP IP/OBS HIGH 50: CPT | Performed by: NURSE PRACTITIONER

## 2018-01-01 PROCEDURE — G0463 HOSPITAL OUTPT CLINIC VISIT: HCPCS

## 2018-01-01 PROCEDURE — 84520 ASSAY OF UREA NITROGEN: CPT | Performed by: PHYSICIAN ASSISTANT

## 2018-01-01 PROCEDURE — 87086 URINE CULTURE/COLONY COUNT: CPT | Performed by: PHYSICIAN ASSISTANT

## 2018-01-01 PROCEDURE — 80053 COMPREHEN METABOLIC PANEL: CPT | Performed by: PHYSICIAN ASSISTANT

## 2018-01-01 PROCEDURE — 81001 URINALYSIS AUTO W/SCOPE: CPT | Mod: ZL | Performed by: NURSE PRACTITIONER

## 2018-01-01 PROCEDURE — 85025 COMPLETE CBC W/AUTO DIFF WBC: CPT | Performed by: PHYSICIAN ASSISTANT

## 2018-01-01 PROCEDURE — 90791 PSYCH DIAGNOSTIC EVALUATION: CPT | Performed by: MARRIAGE & FAMILY THERAPIST

## 2018-01-01 PROCEDURE — 81001 URINALYSIS AUTO W/SCOPE: CPT | Performed by: PHYSICIAN ASSISTANT

## 2018-01-01 PROCEDURE — 99223 1ST HOSP IP/OBS HIGH 75: CPT | Performed by: NURSE PRACTITIONER

## 2018-01-01 PROCEDURE — 25000125 ZZHC RX 250: Performed by: PHYSICIAN ASSISTANT

## 2018-01-01 PROCEDURE — 99232 SBSQ HOSP IP/OBS MODERATE 35: CPT | Performed by: NURSE PRACTITIONER

## 2018-01-01 PROCEDURE — 36415 COLL VENOUS BLD VENIPUNCTURE: CPT | Performed by: PHYSICIAN ASSISTANT

## 2018-01-01 PROCEDURE — 80076 HEPATIC FUNCTION PANEL: CPT | Mod: ZL | Performed by: NURSE PRACTITIONER

## 2018-01-01 PROCEDURE — 82565 ASSAY OF CREATININE: CPT | Performed by: PHYSICIAN ASSISTANT

## 2018-01-01 PROCEDURE — 81003 URINALYSIS AUTO W/O SCOPE: CPT | Performed by: NURSE PRACTITIONER

## 2018-01-01 PROCEDURE — 96372 THER/PROPH/DIAG INJ SC/IM: CPT

## 2018-01-01 PROCEDURE — 96361 HYDRATE IV INFUSION ADD-ON: CPT

## 2018-01-01 PROCEDURE — 99283 EMERGENCY DEPT VISIT LOW MDM: CPT | Mod: 25

## 2018-01-01 RX ORDER — MULTIPLE VITAMINS W/ MINERALS TAB 9MG-400MCG
1 TAB ORAL DAILY
Status: DISCONTINUED | OUTPATIENT
Start: 2018-01-01 | End: 2018-01-01 | Stop reason: HOSPADM

## 2018-01-01 RX ORDER — MINERAL OIL/HYDROPHIL PETROLAT
OINTMENT (GRAM) TOPICAL EVERY 4 HOURS PRN
Status: DISCONTINUED | OUTPATIENT
Start: 2018-01-01 | End: 2018-01-01 | Stop reason: HOSPADM

## 2018-01-01 RX ORDER — OLANZAPINE 5 MG/1
TABLET ORAL
Qty: 135 TABLET | OUTPATIENT
Start: 2018-01-01

## 2018-01-01 RX ORDER — ALUMINA, MAGNESIA, AND SIMETHICONE 2400; 2400; 240 MG/30ML; MG/30ML; MG/30ML
30 SUSPENSION ORAL EVERY 4 HOURS PRN
Status: DISCONTINUED | OUTPATIENT
Start: 2018-01-01 | End: 2018-01-01 | Stop reason: HOSPADM

## 2018-01-01 RX ORDER — OLANZAPINE 5 MG/1
TABLET ORAL
Qty: 135 TABLET | Refills: 3 | Status: SHIPPED | OUTPATIENT
Start: 2018-01-01 | End: 2018-01-01

## 2018-01-01 RX ORDER — LORAZEPAM 1 MG/1
1 TABLET ORAL EVERY 4 HOURS PRN
Status: DISCONTINUED | OUTPATIENT
Start: 2018-01-01 | End: 2018-01-01

## 2018-01-01 RX ORDER — CARBIDOPA AND LEVODOPA 25; 100 MG/1; MG/1
1 TABLET ORAL
Qty: 60 TABLET | Refills: 3 | Status: ON HOLD | OUTPATIENT
Start: 2018-01-01 | End: 2018-01-01

## 2018-01-01 RX ORDER — SODIUM CHLORIDE 9 MG/ML
1000 INJECTION, SOLUTION INTRAVENOUS CONTINUOUS
Status: DISCONTINUED | OUTPATIENT
Start: 2018-01-01 | End: 2018-01-01

## 2018-01-01 RX ORDER — ACETAMINOPHEN 325 MG/1
650 TABLET ORAL EVERY 6 HOURS PRN
Status: DISCONTINUED | OUTPATIENT
Start: 2018-01-01 | End: 2018-01-01 | Stop reason: DRUGHIGH

## 2018-01-01 RX ORDER — MEMANTINE HYDROCHLORIDE 10 MG/1
10 TABLET ORAL DAILY
Status: DISCONTINUED | OUTPATIENT
Start: 2018-01-01 | End: 2018-01-01 | Stop reason: HOSPADM

## 2018-01-01 RX ORDER — HALOPERIDOL 5 MG/ML
10 INJECTION INTRAMUSCULAR EVERY 6 HOURS PRN
Status: DISCONTINUED | OUTPATIENT
Start: 2018-01-01 | End: 2018-01-01

## 2018-01-01 RX ORDER — OLANZAPINE 10 MG/2ML
5 INJECTION, POWDER, FOR SOLUTION INTRAMUSCULAR
Status: DISCONTINUED | OUTPATIENT
Start: 2018-01-01 | End: 2018-01-01 | Stop reason: HOSPADM

## 2018-01-01 RX ORDER — DONEPEZIL HYDROCHLORIDE 10 MG/1
TABLET, FILM COATED ORAL
Qty: 30 TABLET | Refills: 3 | Status: SHIPPED | OUTPATIENT
Start: 2018-01-01 | End: 2018-01-01

## 2018-01-01 RX ORDER — AMOXICILLIN 250 MG
1 CAPSULE ORAL DAILY
Status: DISCONTINUED | OUTPATIENT
Start: 2018-01-01 | End: 2018-01-01

## 2018-01-01 RX ORDER — HYDROXYZINE HYDROCHLORIDE 25 MG/1
25-50 TABLET, FILM COATED ORAL EVERY 4 HOURS PRN
Status: DISCONTINUED | OUTPATIENT
Start: 2018-01-01 | End: 2018-01-01 | Stop reason: HOSPADM

## 2018-01-01 RX ORDER — BISACODYL 10 MG
10 SUPPOSITORY, RECTAL RECTAL DAILY PRN
COMMUNITY
End: 2018-01-01

## 2018-01-01 RX ORDER — BISACODYL 10 MG
10 SUPPOSITORY, RECTAL RECTAL DAILY PRN
Status: DISCONTINUED | OUTPATIENT
Start: 2018-01-01 | End: 2018-01-01 | Stop reason: HOSPADM

## 2018-01-01 RX ORDER — OLANZAPINE 10 MG/2ML
5 INJECTION, POWDER, FOR SOLUTION INTRAMUSCULAR ONCE
Status: COMPLETED | OUTPATIENT
Start: 2018-01-01 | End: 2018-01-01

## 2018-01-01 RX ORDER — DONEPEZIL HYDROCHLORIDE 5 MG/1
10 TABLET, FILM COATED ORAL AT BEDTIME
Status: DISCONTINUED | OUTPATIENT
Start: 2018-01-01 | End: 2018-01-01 | Stop reason: HOSPADM

## 2018-01-01 RX ORDER — TIOTROPIUM BROMIDE 18 UG/1
18 CAPSULE ORAL; RESPIRATORY (INHALATION) DAILY
COMMUNITY
End: 2018-01-01

## 2018-01-01 RX ORDER — ONDANSETRON 2 MG/ML
8 INJECTION INTRAMUSCULAR; INTRAVENOUS ONCE
Status: DISCONTINUED | OUTPATIENT
Start: 2018-01-01 | End: 2018-01-01

## 2018-01-01 RX ORDER — TIOTROPIUM BROMIDE 18 UG/1
18 CAPSULE ORAL; RESPIRATORY (INHALATION) DAILY
COMMUNITY

## 2018-01-01 RX ORDER — LEVOTHYROXINE SODIUM 25 UG/1
75 TABLET ORAL DAILY
Status: DISCONTINUED | OUTPATIENT
Start: 2018-01-01 | End: 2018-01-01 | Stop reason: HOSPADM

## 2018-01-01 RX ORDER — OLANZAPINE 10 MG/2ML
10 INJECTION, POWDER, FOR SOLUTION INTRAMUSCULAR
Status: DISCONTINUED | OUTPATIENT
Start: 2018-01-01 | End: 2018-01-01

## 2018-01-01 RX ORDER — HALOPERIDOL 5 MG/1
5 TABLET ORAL ONCE
Status: COMPLETED | OUTPATIENT
Start: 2018-01-01 | End: 2018-01-01

## 2018-01-01 RX ORDER — LORAZEPAM 2 MG/ML
2 INJECTION INTRAMUSCULAR EVERY 6 HOURS PRN
Status: DISCONTINUED | OUTPATIENT
Start: 2018-01-01 | End: 2018-01-01

## 2018-01-01 RX ORDER — TRAZODONE HYDROCHLORIDE 50 MG/1
50 TABLET, FILM COATED ORAL AT BEDTIME
Status: DISCONTINUED | OUTPATIENT
Start: 2018-01-01 | End: 2018-01-01 | Stop reason: HOSPADM

## 2018-01-01 RX ORDER — HALOPERIDOL 5 MG/ML
5 INJECTION INTRAMUSCULAR EVERY 6 HOURS PRN
Status: DISCONTINUED | OUTPATIENT
Start: 2018-01-01 | End: 2018-01-01 | Stop reason: HOSPADM

## 2018-01-01 RX ORDER — TRAZODONE HYDROCHLORIDE 100 MG/1
100 TABLET ORAL AT BEDTIME
Status: DISCONTINUED | OUTPATIENT
Start: 2018-01-01 | End: 2018-01-01

## 2018-01-01 RX ORDER — OLANZAPINE 10 MG/1
10 TABLET ORAL
Status: DISCONTINUED | OUTPATIENT
Start: 2018-01-01 | End: 2018-01-01

## 2018-01-01 RX ORDER — CARBIDOPA AND LEVODOPA 25; 100 MG/1; MG/1
1 TABLET ORAL
Status: DISCONTINUED | OUTPATIENT
Start: 2018-01-01 | End: 2018-01-01

## 2018-01-01 RX ORDER — BUPROPION HYDROCHLORIDE 300 MG/1
TABLET ORAL
Qty: 30 TABLET | Refills: 0 | Status: ON HOLD | OUTPATIENT
Start: 2018-01-01 | End: 2018-01-01

## 2018-01-01 RX ORDER — ALBUTEROL SULFATE 90 UG/1
2 AEROSOL, METERED RESPIRATORY (INHALATION) EVERY 6 HOURS
Status: ON HOLD | COMMUNITY
End: 2018-01-01

## 2018-01-01 RX ORDER — GABAPENTIN 100 MG/1
100 CAPSULE ORAL 3 TIMES DAILY
Status: DISCONTINUED | OUTPATIENT
Start: 2018-01-01 | End: 2018-01-01 | Stop reason: HOSPADM

## 2018-01-01 RX ORDER — TRAZODONE HYDROCHLORIDE 50 MG/1
50 TABLET, FILM COATED ORAL
Status: DISCONTINUED | OUTPATIENT
Start: 2018-01-01 | End: 2018-01-01 | Stop reason: HOSPADM

## 2018-01-01 RX ORDER — ACETAMINOPHEN 325 MG/1
650 TABLET ORAL EVERY 4 HOURS PRN
Status: DISCONTINUED | OUTPATIENT
Start: 2018-01-01 | End: 2018-01-01 | Stop reason: HOSPADM

## 2018-01-01 RX ORDER — CLONIDINE HYDROCHLORIDE 0.1 MG/1
0.1 TABLET ORAL 2 TIMES DAILY
Status: DISCONTINUED | OUTPATIENT
Start: 2018-01-01 | End: 2018-01-01 | Stop reason: HOSPADM

## 2018-01-01 RX ORDER — LORAZEPAM 1 MG/1
2 TABLET ORAL EVERY 4 HOURS PRN
Status: DISCONTINUED | OUTPATIENT
Start: 2018-01-01 | End: 2018-01-01

## 2018-01-01 RX ORDER — DULOXETIN HYDROCHLORIDE 30 MG/1
30 CAPSULE, DELAYED RELEASE ORAL DAILY
Status: DISCONTINUED | OUTPATIENT
Start: 2018-01-01 | End: 2018-01-01 | Stop reason: HOSPADM

## 2018-01-01 RX ORDER — AMLODIPINE BESYLATE 5 MG/1
5 TABLET ORAL DAILY
Status: DISCONTINUED | OUTPATIENT
Start: 2018-01-01 | End: 2018-01-01 | Stop reason: HOSPADM

## 2018-01-01 RX ORDER — AZITHROMYCIN 250 MG/1
250 TABLET, FILM COATED ORAL DAILY
Qty: 6 TABLET | Refills: 0 | Status: SHIPPED | OUTPATIENT
Start: 2018-01-01

## 2018-01-01 RX ORDER — OLANZAPINE 5 MG/1
5 TABLET ORAL
Status: DISCONTINUED | OUTPATIENT
Start: 2018-01-01 | End: 2018-01-01 | Stop reason: HOSPADM

## 2018-01-01 RX ORDER — BISACODYL 10 MG
10 SUPPOSITORY, RECTAL RECTAL DAILY PRN
Status: DISCONTINUED | OUTPATIENT
Start: 2018-01-01 | End: 2018-01-01

## 2018-01-01 RX ADMIN — CLONIDINE HYDROCHLORIDE 0.1 MG: 0.1 TABLET ORAL at 20:31

## 2018-01-01 RX ADMIN — OMEPRAZOLE 20 MG: 20 CAPSULE, DELAYED RELEASE ORAL at 13:06

## 2018-01-01 RX ADMIN — AMLODIPINE BESYLATE 5 MG: 5 TABLET ORAL at 19:04

## 2018-01-01 RX ADMIN — CLONIDINE HYDROCHLORIDE 0.1 MG: 0.1 TABLET ORAL at 20:06

## 2018-01-01 RX ADMIN — LORAZEPAM 2 MG: 1 TABLET ORAL at 06:20

## 2018-01-01 RX ADMIN — GABAPENTIN 100 MG: 100 CAPSULE ORAL at 20:06

## 2018-01-01 RX ADMIN — ACETAMINOPHEN 650 MG: 325 TABLET, FILM COATED ORAL at 20:32

## 2018-01-01 RX ADMIN — CLONIDINE HYDROCHLORIDE 0.1 MG: 0.1 TABLET ORAL at 09:11

## 2018-01-01 RX ADMIN — CLONIDINE HYDROCHLORIDE 0.1 MG: 0.1 TABLET ORAL at 09:08

## 2018-01-01 RX ADMIN — CLONIDINE HYDROCHLORIDE 0.1 MG: 0.1 TABLET ORAL at 13:08

## 2018-01-01 RX ADMIN — LEVOTHYROXINE SODIUM 75 MCG: 25 TABLET ORAL at 08:38

## 2018-01-01 RX ADMIN — ACETAMINOPHEN 650 MG: 325 TABLET, FILM COATED ORAL at 18:16

## 2018-01-01 RX ADMIN — GABAPENTIN 100 MG: 100 CAPSULE ORAL at 09:11

## 2018-01-01 RX ADMIN — GABAPENTIN 100 MG: 100 CAPSULE ORAL at 19:04

## 2018-01-01 RX ADMIN — CHOLECALCIFEROL CAP 125 MCG (5000 UNIT) 5000 UNITS: 125 CAP at 09:08

## 2018-01-01 RX ADMIN — CHOLECALCIFEROL CAP 125 MCG (5000 UNIT) 5000 UNITS: 125 CAP at 08:45

## 2018-01-01 RX ADMIN — MEMANTINE HYDROCHLORIDE 10 MG: 10 TABLET ORAL at 13:07

## 2018-01-01 RX ADMIN — CLONIDINE HYDROCHLORIDE 0.1 MG: 0.1 TABLET ORAL at 19:04

## 2018-01-01 RX ADMIN — DULOXETINE HYDROCHLORIDE 30 MG: 30 CAPSULE, DELAYED RELEASE ORAL at 08:58

## 2018-01-01 RX ADMIN — MULTIPLE VITAMINS W/ MINERALS TAB 1 TABLET: TAB at 08:45

## 2018-01-01 RX ADMIN — TRAZODONE HYDROCHLORIDE 50 MG: 50 TABLET ORAL at 20:32

## 2018-01-01 RX ADMIN — CLONIDINE HYDROCHLORIDE 0.1 MG: 0.1 TABLET ORAL at 08:59

## 2018-01-01 RX ADMIN — CLONIDINE HYDROCHLORIDE 0.1 MG: 0.1 TABLET ORAL at 20:24

## 2018-01-01 RX ADMIN — WHITE PETROLATUM: 1.75 OINTMENT TOPICAL at 09:06

## 2018-01-01 RX ADMIN — HYDROXYZINE HYDROCHLORIDE 50 MG: 25 TABLET ORAL at 16:36

## 2018-01-01 RX ADMIN — DULOXETINE HYDROCHLORIDE 30 MG: 30 CAPSULE, DELAYED RELEASE ORAL at 09:11

## 2018-01-01 RX ADMIN — WHITE PETROLATUM: 1.75 OINTMENT TOPICAL at 09:36

## 2018-01-01 RX ADMIN — MEMANTINE HYDROCHLORIDE 10 MG: 10 TABLET ORAL at 09:11

## 2018-01-01 RX ADMIN — CHOLECALCIFEROL CAP 125 MCG (5000 UNIT) 5000 UNITS: 125 CAP at 13:07

## 2018-01-01 RX ADMIN — LEVOTHYROXINE SODIUM 75 MCG: 25 TABLET ORAL at 08:00

## 2018-01-01 RX ADMIN — GABAPENTIN 100 MG: 100 CAPSULE ORAL at 20:31

## 2018-01-01 RX ADMIN — OLANZAPINE 5 MG: 5 TABLET, FILM COATED ORAL at 21:13

## 2018-01-01 RX ADMIN — CHOLECALCIFEROL CAP 125 MCG (5000 UNIT) 5000 UNITS: 125 CAP at 08:58

## 2018-01-01 RX ADMIN — DONEPEZIL HYDROCHLORIDE 10 MG: 5 TABLET, FILM COATED ORAL at 20:06

## 2018-01-01 RX ADMIN — DONEPEZIL HYDROCHLORIDE 10 MG: 5 TABLET, FILM COATED ORAL at 20:23

## 2018-01-01 RX ADMIN — MULTIPLE VITAMINS W/ MINERALS TAB 1 TABLET: TAB at 13:07

## 2018-01-01 RX ADMIN — ACETAMINOPHEN 650 MG: 325 TABLET, FILM COATED ORAL at 04:02

## 2018-01-01 RX ADMIN — ACETAMINOPHEN 650 MG: 325 TABLET, FILM COATED ORAL at 09:08

## 2018-01-01 RX ADMIN — HALOPERIDOL 5 MG: 5 TABLET ORAL at 19:05

## 2018-01-01 RX ADMIN — ACETAMINOPHEN 650 MG: 325 TABLET, FILM COATED ORAL at 08:37

## 2018-01-01 RX ADMIN — ACETAMINOPHEN 650 MG: 325 TABLET, FILM COATED ORAL at 08:45

## 2018-01-01 RX ADMIN — MULTIPLE VITAMINS W/ MINERALS TAB 1 TABLET: TAB at 08:58

## 2018-01-01 RX ADMIN — AMLODIPINE BESYLATE 5 MG: 5 TABLET ORAL at 20:31

## 2018-01-01 RX ADMIN — MEMANTINE HYDROCHLORIDE 10 MG: 10 TABLET ORAL at 08:59

## 2018-01-01 RX ADMIN — TRAZODONE HYDROCHLORIDE 50 MG: 50 TABLET ORAL at 19:04

## 2018-01-01 RX ADMIN — CLONIDINE HYDROCHLORIDE 0.1 MG: 0.1 TABLET ORAL at 08:46

## 2018-01-01 RX ADMIN — TRAZODONE HYDROCHLORIDE 50 MG: 50 TABLET ORAL at 20:06

## 2018-01-01 RX ADMIN — AMLODIPINE BESYLATE 5 MG: 5 TABLET ORAL at 20:06

## 2018-01-01 RX ADMIN — MEMANTINE HYDROCHLORIDE 10 MG: 10 TABLET ORAL at 08:45

## 2018-01-01 RX ADMIN — ACETAMINOPHEN 650 MG: 325 TABLET, FILM COATED ORAL at 00:13

## 2018-01-01 RX ADMIN — OMEPRAZOLE 20 MG: 20 CAPSULE, DELAYED RELEASE ORAL at 08:00

## 2018-01-01 RX ADMIN — ACETAMINOPHEN 650 MG: 325 TABLET, FILM COATED ORAL at 08:49

## 2018-01-01 RX ADMIN — ACETAMINOPHEN 650 MG: 325 TABLET, FILM COATED ORAL at 06:30

## 2018-01-01 RX ADMIN — CHOLECALCIFEROL CAP 125 MCG (5000 UNIT) 5000 UNITS: 125 CAP at 09:12

## 2018-01-01 RX ADMIN — ACETAMINOPHEN 650 MG: 325 TABLET, FILM COATED ORAL at 13:06

## 2018-01-01 RX ADMIN — GABAPENTIN 100 MG: 100 CAPSULE ORAL at 20:23

## 2018-01-01 RX ADMIN — TRAZODONE HYDROCHLORIDE 50 MG: 50 TABLET ORAL at 20:25

## 2018-01-01 RX ADMIN — DONEPEZIL HYDROCHLORIDE 10 MG: 5 TABLET, FILM COATED ORAL at 19:05

## 2018-01-01 RX ADMIN — LEVOTHYROXINE SODIUM 75 MCG: 25 TABLET ORAL at 07:27

## 2018-01-01 RX ADMIN — ACETAMINOPHEN 650 MG: 325 TABLET, FILM COATED ORAL at 13:28

## 2018-01-01 RX ADMIN — OMEPRAZOLE 20 MG: 20 CAPSULE, DELAYED RELEASE ORAL at 08:38

## 2018-01-01 RX ADMIN — ACETAMINOPHEN 650 MG: 325 TABLET, FILM COATED ORAL at 18:39

## 2018-01-01 RX ADMIN — ACETAMINOPHEN 650 MG: 325 TABLET, FILM COATED ORAL at 23:59

## 2018-01-01 RX ADMIN — LEVOTHYROXINE SODIUM 75 MCG: 25 TABLET ORAL at 13:07

## 2018-01-01 RX ADMIN — MULTIPLE VITAMINS W/ MINERALS TAB 1 TABLET: TAB at 09:11

## 2018-01-01 RX ADMIN — LEVOTHYROXINE SODIUM 75 MCG: 25 TABLET ORAL at 08:45

## 2018-01-01 RX ADMIN — OMEPRAZOLE 20 MG: 20 CAPSULE, DELAYED RELEASE ORAL at 07:27

## 2018-01-01 RX ADMIN — AMLODIPINE BESYLATE 5 MG: 5 TABLET ORAL at 20:24

## 2018-01-01 RX ADMIN — GABAPENTIN 100 MG: 100 CAPSULE ORAL at 13:06

## 2018-01-01 RX ADMIN — OLANZAPINE 5 MG: 10 INJECTION, POWDER, FOR SOLUTION INTRAMUSCULAR at 14:35

## 2018-01-01 RX ADMIN — DULOXETINE HYDROCHLORIDE 30 MG: 30 CAPSULE, DELAYED RELEASE ORAL at 08:46

## 2018-01-01 RX ADMIN — SODIUM CHLORIDE 1000 ML: 9 INJECTION, SOLUTION INTRAVENOUS at 15:34

## 2018-01-01 RX ADMIN — OMEPRAZOLE 20 MG: 20 CAPSULE, DELAYED RELEASE ORAL at 08:45

## 2018-01-01 RX ADMIN — DONEPEZIL HYDROCHLORIDE 10 MG: 5 TABLET, FILM COATED ORAL at 20:32

## 2018-01-01 ASSESSMENT — ANXIETY QUESTIONNAIRES
1. FEELING NERVOUS, ANXIOUS, OR ON EDGE: NEARLY EVERY DAY
5. BEING SO RESTLESS THAT IT IS HARD TO SIT STILL: SEVERAL DAYS
7. FEELING AFRAID AS IF SOMETHING AWFUL MIGHT HAPPEN: NEARLY EVERY DAY
5. BEING SO RESTLESS THAT IT IS HARD TO SIT STILL: SEVERAL DAYS
IF YOU CHECKED OFF ANY PROBLEMS ON THIS QUESTIONNAIRE, HOW DIFFICULT HAVE THESE PROBLEMS MADE IT FOR YOU TO DO YOUR WORK, TAKE CARE OF THINGS AT HOME, OR GET ALONG WITH OTHER PEOPLE: VERY DIFFICULT
3. WORRYING TOO MUCH ABOUT DIFFERENT THINGS: NEARLY EVERY DAY
7. FEELING AFRAID AS IF SOMETHING AWFUL MIGHT HAPPEN: NEARLY EVERY DAY
GAD7 TOTAL SCORE: 15
GAD7 TOTAL SCORE: 21
6. BECOMING EASILY ANNOYED OR IRRITABLE: NEARLY EVERY DAY
2. NOT BEING ABLE TO STOP OR CONTROL WORRYING: NEARLY EVERY DAY
7. FEELING AFRAID AS IF SOMETHING AWFUL MIGHT HAPPEN: SEVERAL DAYS
1. FEELING NERVOUS, ANXIOUS, OR ON EDGE: MORE THAN HALF THE DAYS
5. BEING SO RESTLESS THAT IT IS HARD TO SIT STILL: NEARLY EVERY DAY
3. WORRYING TOO MUCH ABOUT DIFFERENT THINGS: NEARLY EVERY DAY
6. BECOMING EASILY ANNOYED OR IRRITABLE: MORE THAN HALF THE DAYS
GAD7 TOTAL SCORE: 15
GAD7 TOTAL SCORE: 17
2. NOT BEING ABLE TO STOP OR CONTROL WORRYING: NEARLY EVERY DAY
1. FEELING NERVOUS, ANXIOUS, OR ON EDGE: NEARLY EVERY DAY
2. NOT BEING ABLE TO STOP OR CONTROL WORRYING: NEARLY EVERY DAY
6. BECOMING EASILY ANNOYED OR IRRITABLE: SEVERAL DAYS
3. WORRYING TOO MUCH ABOUT DIFFERENT THINGS: NEARLY EVERY DAY
GAD7 TOTAL SCORE: 21
GAD7 TOTAL SCORE: 17
IF YOU CHECKED OFF ANY PROBLEMS ON THIS QUESTIONNAIRE, HOW DIFFICULT HAVE THESE PROBLEMS MADE IT FOR YOU TO DO YOUR WORK, TAKE CARE OF THINGS AT HOME, OR GET ALONG WITH OTHER PEOPLE: EXTREMELY DIFFICULT

## 2018-01-01 ASSESSMENT — PATIENT HEALTH QUESTIONNAIRE - PHQ9
SUM OF ALL RESPONSES TO PHQ QUESTIONS 1-9: 27
SUM OF ALL RESPONSES TO PHQ QUESTIONS 1-9: 22
5. POOR APPETITE OR OVEREATING: NEARLY EVERY DAY
SUM OF ALL RESPONSES TO PHQ QUESTIONS 1-9: 24
5. POOR APPETITE OR OVEREATING: NEARLY EVERY DAY
5. POOR APPETITE OR OVEREATING: NEARLY EVERY DAY

## 2018-01-01 ASSESSMENT — ACTIVITIES OF DAILY LIVING (ADL)
DRESS: INDEPENDENT;SCRUBS (BEHAVIORAL HEALTH)
ORAL_HYGIENE: INDEPENDENT
DRESS: 0-->INDEPENDENT
GROOMING: INDEPENDENT
GROOMING: INDEPENDENT
BATHING: 0-->INDEPENDENT
GROOMING: INDEPENDENT
TOILETING: 0-->INDEPENDENT
SWALLOWING: 0-->SWALLOWS FOODS/LIQUIDS WITHOUT DIFFICULTY
GROOMING: INDEPENDENT
COGNITION: 2 - DIFFICULTY WITH ORGANIZING THOUGHTS
DRESS: INDEPENDENT
DRESS: SCRUBS (BEHAVIORAL HEALTH)
DRESS: INDEPENDENT;SCRUBS (BEHAVIORAL HEALTH)
RETIRED_EATING: 0-->INDEPENDENT
ORAL_HYGIENE: INDEPENDENT
LAUNDRY: UNABLE TO COMPLETE
ORAL_HYGIENE: INDEPENDENT
LAUNDRY: UNABLE TO COMPLETE
TRANSFERRING: 0-->INDEPENDENT
ORAL_HYGIENE: INDEPENDENT
LAUNDRY: UNABLE TO COMPLETE
ORAL_HYGIENE: INDEPENDENT
DRESS: INDEPENDENT
DRESS: SCRUBS (BEHAVIORAL HEALTH)
DRESS: SCRUBS (BEHAVIORAL HEALTH)
RETIRED_COMMUNICATION: 0-->UNDERSTANDS/COMMUNICATES WITHOUT DIFFICULTY
FALL_HISTORY_WITHIN_LAST_SIX_MONTHS: NO
GROOMING: INDEPENDENT
ORAL_HYGIENE: INDEPENDENT
GROOMING: INDEPENDENT
LAUNDRY: UNABLE TO COMPLETE
DRESS: SCRUBS (BEHAVIORAL HEALTH)
AMBULATION: 0-->INDEPENDENT
LAUNDRY: UNABLE TO COMPLETE
ORAL_HYGIENE: INDEPENDENT

## 2018-01-01 ASSESSMENT — PAIN SCALES - GENERAL: PAINLEVEL: NO PAIN (0)

## 2018-01-02 NOTE — PROGRESS NOTES
Progress Note    Client Name: Tamera Kumar  Date: December 21, 2017         Service Type: Individual      Session Start Time: 2:00  Session End Time: 2:55      Session Length: 55 minutes     Session #: 14     Attendees: Client attended alone    DSM-V Diagnoses:   296.53 Bipolar I Disorder Current or Most Recent Episode Depressed, Severe    DA Date: 1/23/17    Treatment Plan Due: 1/25/18  PHQ-9 :   PHQ-9 SCORE 9/15/2017 11/15/2017 11/29/2017   Total Score 22 16 22      BRIGID-7 :    BRIGID-7 SCORE 9/18/2017 11/17/2017 11/29/2017   Total Score 17 14 15           DATA      Progress Since Last Session (Related to Symptoms / Goals / Homework):   Symptoms: increased symptoms    Homework: Completed in session     Current / Ongoing Stressors and Concerns:  Tamera continues to present as anxious and restless. She continues to display what appears to be increased involuntary movements. Tamera reports increased irritability and continues to have no motivation to participate in activities.      Treatment Objective(s) Addressed in This Session:   Objective 1A (Client Action)                  Client will learn IMR and CBT skills to use for reducing depressed mood.       Intervention:   Provided psycho-education utilizing CBT, continuing work on cognitive restructuring.     Response to Interventions:   Tamera stated she understood this information.      ASSESSMENT: Current Emotional / Mental Status (status of significant symptoms):   Risk status (Self / Other harm or suicidal ideation)   Client denies current fears or concerns for personal safety.   Client reports fleeting thoughts she would be better off dead, she denies any plan or intent.   Client denies current or recent homicidal ideation or behaviors.   Client denies current or recent self injurious behavior or ideation.   Client denies other safety concerns.   A safety and risk management plan has not been developed at this time,  however client was given the after-hours number / 911 should there be a change in any of these risk factors.     Appearance:   Appropriate    Eye Contact:   Fair    Psychomotor Behavior: Restless    Attitude:   Cooperative    Orientation:   All   Speech    Rate / Production: Normal     Volume:  Normal    Mood:    Anxious  Depressed    Affect:    Flat  Worrisome    Thought Content:  Rumination    Thought Form:  Coherent  Circumstantial   Insight:    Poor         Medication Compliance:   Yes     Changes in Health Issues:   None reported     Chemical Use Review:   Substance Use: Chemical use reviewed, no active concerns identified      Tobacco Use: No current tobacco use.       Collateral Reports Completed:   Not Applicable    PLAN: (Client Tasks / Therapist Tasks / Other)  It was explained that this therapist is resigning. David's options for continuing services with [facility] or obtaining a referral were discussed. Tamera elected to continue services with [facility] and is scheduled [facility]

## 2018-01-05 NOTE — MR AVS SNAPSHOT
After Visit Summary   1/5/2018    Tamera Kumar    MRN: [id number]           Patient Information     Date Of Birth          1940        Visit Information        Provider Department      1/5/2018 10:30 AM Lei Bolton LMFT [facility]        Today's Diagnoses     Bipolar I disorder, most recent episode depressed (H)    -  1       Follow-ups after your visit        Your next 10 appointments already scheduled     Jan 12, 2018  1:30 PM CST   (Arrive by 1:15 PM)   Return Visit with IAN Redd [facility] ([facility] )    [address] [place] [address]   [phone]            Jan 16, 2018  9:30 AM CST   (Arrive by 9:15 AM)   Return Visit with FOSTER Kent   [facility] ([facility] )    [address] [place] [id number]   [phone]            Jan 23, 2018  9:30 AM CST   (Arrive by 9:15 AM)   Return Visit with FOSTER Kent   [facility] ([facility] )    [address] [place] [id number] [phone]              Who to contact     If you have questions or need follow up information about today's clinic visit or your schedule please contact [facility] directly at [phone].  Normal or non-critical lab and imaging results will be communicated to you by MyChart, letter or phone within 4 business days after the clinic has received the results. If you do not hear from us within 7 days, please contact the clinic through MyChart or phone. If you have a critical or abnormal lab result, we will notify you by phone as soon as possible.  Submit refill requests through [employer] or call your pharmacy and they will forward the refill request to us. Please allow 3 business days for your refill to be completed.          Additional Information About Your Visit        "[employer] Information     [employer] lets you send messages to your  "doctor, view your test results, renew your prescriptions, schedule appointments and more. To sign up, go to [email or web address] . Click on \"Log in\" on the left side of the screen, which will take you to the Welcome page. Then click on \"Sign up Now\" on the right side of the page.     You will be asked to enter the access code listed below, as well as some personal information. Please follow the directions to create your username and password.     Your access code is: [email or web address]  Expires: 2018 12:59 AM     Your access code will  in 90 days. If you need help or a new code, please call your [place] clinic or [phone].        Care EveryWhere ID     This is your Care EveryWhere ID. This could be used by other organizations to access your [place] medical records  [id number]         Blood Pressure from Last 3 Encounters:   17 130/56   17 110/50   09/15/17 116/60    Weight from Last 3 Encounters:   17 112 lb (50.8 kg)   09/15/17 114 lb (51.7 kg)   17 108 lb (49 kg)              Today, you had the following     No orders found for display       Primary Care Provider Office Phone # Fax #    Keith Lemons [phone]       [facility] [address] [facility] [address]        Equal Access to Services     [facility] : Hadii aad ku hadasho Socarlosali, waaxda luqadaha, qaybta kaalmada adeegyada, melania crook . So [facility] [phone].    ATENCIÓN: Si habla español, tiene a pittman disposición servicios gratuitos de asistencia lingüística. Llame al [phone].    We comply with applicable federal civil rights laws and [place] laws. We do not discriminate on the basis of race, color, national origin, age, disability, sex, sexual orientation, or gender identity.            Thank you!     Thank you for choosing [facility]  for your care. Our goal is always to provide you with excellent care. Hearing back from our patients is " one way we can continue to improve our services. Please take a few minutes to complete the written survey that you may receive in the mail after your visit with us. Thank you!             Your Updated Medication List - Protect others around you: Learn how to safely use, store and throw away your medicines at www.disposemymeds.org.          This list is accurate as of: 1/5/18 11:59 PM.  Always use your most recent med list.                   Brand Name Dispense Instructions for use Diagnosis    ACETAMINOPHEN PO      Take 325 mg by mouth every 6 hours as needed for pain        amLODIPine 5 MG tablet    NORVASC    30 tablet    Take 1 tablet (5 mg) by mouth daily    Essential hypertension       buPROPion 300 MG 24 hr tablet    WELLBUTRIN XL    30 tablet    take one tablet by mouth every morning.    Severe episode of recurrent major depressive disorder, without psychotic features (H)       cholecalciferol 5000 UNITS Caps     30 capsule    Take 1 capsule (5,000 Units) by mouth daily    Bipolar 1 disorder (H)       CLONIDINE HCL PO      Take 0.1 mg by mouth 2 times daily        donepezil 10 MG tablet    ARICEPT    30 tablet    Take 1 tablet (10 mg) by mouth At Bedtime    Vascular dementia with behavior disturbance       DULoxetine 30 MG EC capsule    CYMBALTA    90 capsule    Take 1 capsule daily    Recurrent major depressive disorder, in partial remission (H)       gabapentin 100 MG capsule    NEURONTIN    90 capsule    Take 1 capsule (100 mg) by mouth 3 times daily    Anxiety       levothyroxine 75 MCG tablet    SYNTHROID/LEVOTHROID    30 tablet    Take 1 tablet (75 mcg) by mouth daily    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 substances       memantine 10 MG tablet    NAMENDA    30 tablet    Take 1 tablet (10 mg) by mouth daily    Vascular dementia with behavior disturbance       MIRALAX Packet   Generic drug:  polyethylene glycol      Take 17 g by mouth daily        multivitamin, therapeutic with minerals Tabs  tablet     30 each    Take 1 tablet by mouth daily    Bipolar 1 disorder (H)       PRILOSEC PO      Take 20 mg by mouth every morning        senna-docusate 8.6-50 MG per tablet    SENOKOT-S;PERICOLACE     Take 1 tablet by mouth daily        * TRAZODONE HCL PO      Take 100 mg by mouth nightly as needed for sleep        * traZODone 100 MG tablet    DESYREL    30 tablet    Take 1 tablet (100 mg) by mouth At Bedtime    Recurrent major depressive disorder, in partial remission (H)       trihexyphenidyl 2 MG tablet    ARTANE    90 tablet    Take 1 tablet (2 mg) by mouth 3 times daily    Severe episode of recurrent major depressive disorder, without psychotic features (H)       ZOLOFT PO      Take 25 mg by mouth daily        * Notice:  This list has 2 medication(s) that are the same as other medications prescribed for you. Read the directions carefully, and ask your doctor or other care provider to review them with you.

## 2018-01-09 NOTE — PROGRESS NOTES
Pt. Has been watching TV Wes which lead her to believe that she should be cured but hasn't been therefore she is angery at self for not having a stronger obi?                                                                                                                                                                        Adult Intake Structured Interview  Standard Diagnostic Assessment      CLIENT'S NAME: Tamera Kumar  MRN:   [id number]  :   1940  ACCT. NUMBER: [id number]  DATE OF SERVICE: 18      Identifying Information:  Client is a 77 year old, ,  female. Client was referred for counseling by their [facility] Therapist. Client is currently unemployed. Client attended the session alone.       Client's Statement of Presenting Concern:  Client reports the reason for seeking therapy at this time as Counselor leaving and has difficulty with spirituality.  Client stated that her symptoms have resulted in the following functional impairments: self-care      History of Presenting Concern:  Client reports that these problem(s) began many years ago. Client has attempted to resolve these concerns in the past through [facility] and counseling. Client reports that other professional(s) are not involved in providing support / services.       Social History:  Client reported she grew up in [place], [place]. They were the fourth born of four children. This is an intact family and parents remain . Client reported that her childhood was good. Client described her current relationships with family of origin as good.    Client reported a history of two+ committed relationships or marriages. Client has been  for 17 years. Client reported having two children. Client identified few stable and meaningful social connections. Client reported that she has not been involved with the legal system.  Client's highest education level was some college. Client  did not identify any learning problems. There are no ethnic, cultural or Yarsanism factors that may be relevant for therapy. Client identified her preferred language to be English. Client reported she does not need the assistance of an  or other support involved in therapy. Modifications will not be used to assist communication in therapy. Client did not serve in the .     Client reports family history is negative for Family History Negative.    Mental Health History:  Client reported the following biological family members or relatives with mental health issues: Mother experienced Depression and suicidial and Brother experienced Depression.  Client previously received the following mental health diagnosis: Depression.  Client has received the following mental health services in the past: counseling.  Hospitalizations: None.  Client is not currently receiving any mental health services.      Chemical Health History:  Client reported no family history of chemical health issues. Client has not received chemical dependency treatment in the past. Client is not currently receiving any chemical dependency treatment. Client reports no problems as a result of their drinking / drug use.      Client Reports:  Client denies using alcohol.  Client denies using tobacco.  Client denies using marijuana.  Client reports using caffeine 3 times per day and drinks 1 at a time. Client started using caffeine at age ?.  Client denies using street drugs.  Client denies the non-medical use of prescription or over the counter drugs.    CAGE: None of the patient's responses to the CAGE screening were positive / Negative CAGE score   Based on the negative Cage-Aid score and clinical interview there  are not indications of drug or alcohol abuse.    Discussed the general effects of drugs and alcohol on health and well-being. Therapist gave client printed information about the effects of chemical use on her health and well  being.      Significant Losses / Trauma / Abuse / Neglect Issues:  There are no indications or report of: significant losses, trauma, abuse or neglect.    Issues of possible neglect are not present.      Medical Issues:  Client has had a physical exam to rule out medical causes for current symptoms. Date of last physical exam was within the past year. Client was encouraged to follow up with PCP if symptoms were to develop. The client has a non-[place] Primary Care Provider. Their PCP is Cristo Cook.. The client reports not having a psychiatrist. Client reports no current medical concerns. The client denies the presence of chronic or episodic pain. There are not significant nutritional concerns.     Medication Adherence:  N/A - Client does not have prescribed psychiatric medications.    Client was provided recommendation to follow-up with prescribing physician.    Mental Status Assessment:  Appearance:   Appropriate   Eye Contact:   Fair   Psychomotor Behavior: Normal   Attitude:   Cooperative   Orientation:   All  Speech   Rate / Production: Impoverished  Pressured    Volume:  Soft   Mood:    Anxious  Depressed   Affect:    Appropriate   Thought Content:  Clear   Thought Form:  Coherent  Logical   Insight:    Fair       Review of Symptoms:  Depression: Sleep Interest Concentration Hopeless  Estrella:  No symptoms none  Psychosis: No symptoms  Anxiety: Worries Nervousness  Panic:  No symptoms  Post Traumatic Stress Disorder: No symptoms  Obsessive Compulsive Disorder: No symptoms  Eating Disorder: No symptoms  Oppositional Defiant Disorder: No symptoms  ADD / ADHD: No symptoms  Conduct Disorder: No symptoms      Safety Assessment:    History of Safety Concerns:   Client denied a history of suicidal ideation.    Client denied a history of suicide attempts.    Client denied a history of homicidal ideation.    Client denied a history of self-injurious ideation and behaviors.    Client denied a history of personal  safety concerns.    Client denied a history of assaultive behaviors.        Current Safety Concerns:  Client denies current suicidal ideation.    Client denies current homicidal ideation and behaviors.  Client denies current self-injurious ideation and behaviors.    Client denies current concerns for personal safety.      Client reports there are no firearms in the house.     Plan for Safety and Risk Management:  A safety and risk management plan has not been developed at this time, however client was given the after-hours number / 911 should there be a change in any of these risk factors.    Client's Strengths and Limitations:  Client identified the following strengths or resources that will help her succeed in counseling: obi / spirituality and friends / good social support. Client identified the following supports: [facility] / spirituality. Things that may interfere with the client's success in counseling include: few friends and transportation concerns.      Diagnostic Criteria:  A. Depressed mood for most of the day, for more days than not, as indicated either by subjective account or observation by others, for at least 2 years. Note: In children and adolescents, mood can be irritable and duration must be at least 1 year.   B. Presence, while depressed, of two (or more) of the following:        - poor appetite or overeating        - insomnia or hypersomnia       - low energy or fatigue        - low self-esteem        - poor concentration or difficulty making decisions   C. During the 2-year period (1 year for children or adolescents) of the disturbance, the person has never been without the symptoms in Criteria A and B for more than 2 months at a time.      Functional Status:  Client's symptoms have caused reduced functional status in the following areas: Activities of Daily Living - unable to concentrate      DSM5 Diagnoses: (Sustained by DSM5 Criteria Listed Above)  Diagnoses: 300.4 (F34.1) Persistent  Depressive Disorder, Moderate  Psychosocial & Contextual Factors: Being alone  Attendance Agreement:  Client has signed Attendance Agreement:Yes      Collaboration:  Collaboration with other professionals is not indicated at this time.      Preliminary Treatment Plan:  The client reports no currently identified Sikhism, ethnic or cultural issues relevant to therapy.     services are not indicated.    Modifications to assist communication are not indicated.    The concerns identified by the client will be addressed in therapy.    Initial Treatment will focus on: Depressed Mood - by reading more positive books.    As a preliminary treatment goal, client will experience a reduction in depressed mood, will develop more effective coping skills to manage depressive symptoms and will increase ability to function adaptively.    The focus of initial interventions will be to alleviate anxiety and alleviate depressed mood.    Referral to another professional/service is not indicated at this time..    A Release of Information is not needed at this time.    Report to child / adult protection services was NA.    Client will have access to their [facility]' medical record.    FOSTER Kent  January 9, 2018

## 2018-01-12 NOTE — MR AVS SNAPSHOT
After Visit Summary   1/12/2018    Tamera Kumar    MRN: [id number]           Patient Information     Date Of Birth          1940        Visit Information        Provider Department      1/12/2018 1:30 PM Naomi Cook APRN CNP [facility]        Today's Diagnoses     Tardive dyskinesia    -  1    Bipolar 1 disorder (H)          Care Instructions    (G24.01) Tardive dyskinesia  (primary encounter diagnosis)    Plan: carbidopa-levodopa (SINEMET)  MG per             Tablet BID. Titration instructions are on the progress note from [place].            (F31.9) Bipolar 1 disorder (H)    Plan: Continue on your other medications.                Follow-ups after your visit        Follow-up notes from your care team     Return in about 4 weeks (around 2/9/2018).      Your next 10 appointments already scheduled     Jan 16, 2018  9:30 AM CST   (Arrive by 9:15 AM)   Return Visit with FOSTER Kent   [facility]    [address] [place] [address]   [phone]            Jan 23, 2018  9:30 AM CST   (Arrive by 9:15 AM)   Return Visit with FOSTER Kent   [facility]    [address] [address] [phone]            Feb 09, 2018  2:45 PM CST   (Arrive by 2:30 PM)   Return Visit with IAN Redd CNP   [facility]    [address] [place] [id number]   [phone]              Who to contact     If you have questions or need follow up information about today's clinic visit or your schedule please contact [facility] directly at [phone].  Normal or non-critical lab and imaging results will be communicated to you by MyChart, letter or phone within 4 business days after the clinic has received the results. If you do not hear from us within 7 days, please contact the  "clinic through [facility] [employer] or phone. If you have a critical or abnormal lab result, we will notify you by phone as soon as possible.  Submit refill requests through [employer] or call your pharmacy and they will forward the refill request to us. Please allow 3 business days for your refill to be completed.          Additional Information About Your Visit        [facility] Information     [employer] lets you send messages to your doctor, view your test results, renew your prescriptions, schedule appointments and more. To sign up, go to [email or web address] . Click on \"Log in\" on the left side of the screen, which will take you to the Welcome page. Then click on \"Sign up Now\" on the right side of the page.     You will be asked to enter the access code listed below, as well as some personal information. Please follow the directions to create your username and password.     Your access code is: [email or web address]  Expires: 2018 12:59 AM     Your access code will  in 90 days. If you need help or a new code, please call your [place] clinic or [phone].        Care EveryWhere ID     This is your Care EveryWhere ID. This could be used by other organizations to access your [place] medical records  [id number]        Your Vitals Were     Pulse Height BMI (Body Mass Index)             76 5' (1.524 m) 21.87 kg/m2          Blood Pressure from Last 3 Encounters:   18 112/62   17 130/56   17 110/50    Weight from Last 3 Encounters:   18 112 lb (50.8 kg)   17 112 lb (50.8 kg)   09/15/17 114 lb (51.7 kg)              Today, you had the following     No orders found for display         Today's Medication Changes          These changes are accurate as of: 18  3:16 PM.  If you have any questions, ask your nurse or doctor.               Start taking these medicines.        Dose/Directions    carbidopa-levodopa  MG per tablet   Commonly known as:  SINEMET   Used for:  Tardive dyskinesia "   Started by:  Naomi Cook APRN CNP        Dose:  1 tablet   Take 1 tablet by mouth 2 times daily   Quantity:  60 tablet   Refills:  3         Stop taking these medicines if you haven't already. Please contact your care team if you have questions.     trihexyphenidyl 2 MG tablet   Commonly known as:  ARTANE   Stopped by:  Naomi Cook APRN CNP                Where to get your medicines      These medications were sent to [facility] [address]  [address] [facility] [address]     Phone:  [phone]     carbidopa-levodopa  MG per tablet                Primary Care Provider Office Phone # Fax #    Keith Lemons [phone]       [facility] [address] [facility] [address]        Equal Access to Services     [facility]: Hadii aad ku hadasho Sosera, waaxda luqadaha, qaybta kaalmada franckyamilind, melania crook . So [facility] [phone].    ATENCIÓN: Si habla español, tiene a pittman disposición servicios gratuitos de asistencia lingüística. Hardy al [phone].    We comply with applicable federal civil rights laws and [place] laws. We do not discriminate on the basis of race, color, national origin, age, disability, sex, sexual orientation, or gender identity.            Thank you!     Thank you for choosing [facility]  for your care. Our goal is always to provide you with excellent care. Hearing back from our patients is one way we can continue to improve our services. Please take a few minutes to complete the written survey that you may receive in the mail after your visit with us. Thank you!             Your Updated Medication List - Protect others around you: Learn how to safely use, store and throw away your medicines at www.disposemymeds.org.          This list is accurate as of: 1/12/18  3:16 PM.  Always use your most recent med list.                   Brand Name Dispense Instructions for use  Diagnosis    ACETAMINOPHEN PO      Take 325 mg by mouth every 6 hours as needed for pain        albuterol 108 (90 BASE) MCG/ACT Inhaler    PROAIR HFA/PROVENTIL HFA/VENTOLIN HFA     Inhale 2 puffs into the lungs every 6 hours        amLODIPine 5 MG tablet    NORVASC    30 tablet    Take 1 tablet (5 mg) by mouth daily    Essential hypertension       bisacodyl 10 MG Suppository    DULCOLAX     Place 10 mg rectally daily as needed for constipation        buPROPion 300 MG 24 hr tablet    WELLBUTRIN XL    30 tablet    take one tablet by mouth every morning.    Severe episode of recurrent major depressive disorder, without psychotic features (H)       carbidopa-levodopa  MG per tablet    SINEMET    60 tablet    Take 1 tablet by mouth 2 times daily    Tardive dyskinesia       cholecalciferol 5000 UNITS Caps     30 capsule    Take 1 capsule (5,000 Units) by mouth daily    Bipolar 1 disorder (H)       CLONIDINE HCL PO      Take 0.1 mg by mouth 2 times daily        donepezil 10 MG tablet    ARICEPT    30 tablet    Take 1 tablet (10 mg) by mouth At Bedtime    Vascular dementia with behavior disturbance       DULoxetine 30 MG EC capsule    CYMBALTA    90 capsule    Take 1 capsule daily    Recurrent major depressive disorder, in partial remission (H)       gabapentin 100 MG capsule    NEURONTIN    90 capsule    Take 1 capsule (100 mg) by mouth 3 times daily    Anxiety       levothyroxine 75 MCG tablet    SYNTHROID/LEVOTHROID    30 tablet    Take 1 tablet (75 mcg) by mouth daily    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 substances       memantine 10 MG tablet    NAMENDA    30 tablet    Take 1 tablet (10 mg) by mouth daily    Vascular dementia with behavior disturbance       MIRALAX Packet   Generic drug:  polyethylene glycol      Take 17 g by mouth daily        multivitamin, therapeutic with minerals Tabs tablet     30 each    Take 1 tablet by mouth daily    Bipolar 1 disorder (H)       PRILOSEC PO      Take 20 mg by  mouth every morning        senna-docusate 8.6-50 MG per tablet    SENOKOT-S;PERICOLACE     Take 1 tablet by mouth daily        tiotropium 18 MCG capsule    SPIRIVA     Inhale 18 mcg into the lungs daily        * TRAZODONE HCL PO      Take 100 mg by mouth nightly as needed for sleep        * traZODone 100 MG tablet    DESYREL    30 tablet    Take 1 tablet (100 mg) by mouth At Bedtime    Recurrent major depressive disorder, in partial remission (H)       * Notice:  This list has 2 medication(s) that are the same as other medications prescribed for you. Read the directions carefully, and ask your doctor or other care provider to review them with you.

## 2018-01-12 NOTE — NURSING NOTE
Chief Complaint   Patient presents with     Recheck Medication       Initial /62  Pulse 76  Ht 5' (1.524 m)  Wt 112 lb (50.8 kg)  BMI 21.87 kg/m2 Estimated body mass index is 21.87 kg/(m^2) as calculated from the following:    Height as of this encounter: 5' (1.524 m).    Weight as of this encounter: 112 lb (50.8 kg).  Medication Reconciliation: fox De

## 2018-01-12 NOTE — PROGRESS NOTES
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 Pts TD has been treatment resistant, cogentin and artane have not been successful. Discussed a trial of low dose sinemet BID, this is high risk in her age group however given failure of other agents a trial low dose does warrent consideration. Tamera reports her mood is better since discontinuing the zoloft however she is \"miserable\" due to the involuntary movements. She continues to live at [place] which she is adjusting to. She is upset today stating she just can't read the bible.    10 point ROS negative other than what is noted in HPI       DIagnoses:   (G24.01) Tardive dyskinesia  (primary encounter diagnosis)    (F31.9) Bipolar 1 disorder (H)  Attestation:  Patient has been seen and evaluated by me,  IAN Robles CNP          Interim History:   The patient's care was discussed  and chart notes were reviewed.          Medications:     Current Outpatient Prescriptions:      tiotropium (SPIRIVA) 18 MCG capsule, Inhale 18 mcg into the lungs daily, Disp: , Rfl:      albuterol (PROAIR HFA/PROVENTIL HFA/VENTOLIN HFA) 108 (90 BASE) MCG/ACT Inhaler, Inhale 2 puffs into the lungs every 6 hours, Disp: , Rfl:      bisacodyl (DULCOLAX) 10 MG Suppository, Place 10 mg rectally daily as needed for constipation, Disp: , Rfl:      carbidopa-levodopa (SINEMET)  MG per tablet, Take 1 tablet by mouth 2 times daily, Disp: 60 tablet, Rfl: 3     buPROPion (WELLBUTRIN XL) 300 MG 24 hr tablet, take one tablet by mouth every morning., Disp: 30 tablet, Rfl: 0     polyethylene glycol (MIRALAX) Packet, Take 17 g by mouth daily, Disp: , Rfl:      TRAZODONE HCL PO, Take 100 mg by mouth nightly as needed for sleep, Disp: , Rfl:      donepezil (ARICEPT) 10 MG tablet, Take 1 tablet (10 mg) by mouth At Bedtime, Disp: 30 tablet, Rfl: 8     gabapentin (NEURONTIN) 100 MG capsule, Take " "1 capsule (100 mg) by mouth 3 times daily, Disp: 90 capsule, Rfl: 0     senna-docusate (SENOKOT-S;PERICOLACE) 8.6-50 MG per tablet, Take 1 tablet by mouth daily, Disp: , Rfl:      ACETAMINOPHEN PO, Take 325 mg by mouth every 6 hours as needed for pain, Disp: , Rfl:      DULoxetine (CYMBALTA) 30 MG EC capsule, Take 1 capsule daily, Disp: 90 capsule, Rfl: 3     CLONIDINE HCL PO, Take 0.1 mg by mouth 2 times daily, Disp: , Rfl:      levothyroxine (SYNTHROID/LEVOTHROID) 75 MCG tablet, Take 1 tablet (75 mcg) by mouth daily, Disp: 30 tablet, Rfl: 0     traZODone (DESYREL) 100 MG tablet, Take 1 tablet (100 mg) by mouth At Bedtime, Disp: 30 tablet, Rfl: 3     Omeprazole (PRILOSEC PO), Take 20 mg by mouth every morning, Disp: , Rfl:      amLODIPine (NORVASC) 5 MG tablet, Take 1 tablet (5 mg) by mouth daily, Disp: 30 tablet, Rfl: 3     memantine (NAMENDA) 10 MG tablet, Take 1 tablet (10 mg) by mouth daily, Disp: 30 tablet, Rfl: 0     multivitamin, therapeutic with minerals (THERA-VIT-M) TABS, Take 1 tablet by mouth daily, Disp: 30 each, Rfl: 0     cholecalciferol 5000 UNITS CAPS, Take 1 capsule (5,000 Units) by mouth daily, Disp: 30 capsule, Rfl: 0            Allergies:     Allergies   Allergen Reactions     Benadryl [Diphenhydramine] Unknown     Patient states she is allergic to benadryl     Depakote      Escitalopram      Suicidal ideation     Famotidine      Fluoxetine      Suicidal ideation     Hydrocodone      Lisinopril      swelling     Lithium Swelling     Other [Seasonal Allergies]      Pet hair       Penicillins Nausea     Ramelteon      Complains of orgasm feeling     Risperdal [Risperidone] Unknown     Patient states she is allergic to risperdal       Valproic Acid Unknown     \"seizures\"            Psychiatric Examination:   /62  Pulse 76  Ht 5' (1.524 m)  Wt 112 lb (50.8 kg)  BMI 21.87 kg/m2  Weight is 112 lbs 0 oz  Body mass index is 21.87 kg/(m^2).    Appearance:  awake, alert and neatly " groomed  Attitude:  cooperative  Eye Contact:  good  Mood:  better  Affect:  mood congruent  Speech:  mumbling and somewhat difficult to understand secondary to her dental problems  Psychomotor Behavior:  evidence of tardive dyskinesia, evidence of tics and fidgeting  Thought Process:  logical  Associations:  no loose associations  Thought Content:  no evidence of suicidal ideation or homicidal ideation and no evidence of psychotic thought  Insight:  fair  Judgment:  fair  Oriented to:  time, person, and place  Attention Span and Concentration:  fair  Recent and Remote Memory:  limited  Fund of Knowledge: appropriate  Muscle Strength and Tone: normal  Gait and Station: Normal  Perception No perceptual disorder noted         Labs:   No results found for this or any previous visit (from the past 24 hour(s)).          Assessment/ Plan:    (G24.01) Tardive dyskinesia  (primary encounter diagnosis)    Plan: carbidopa-levodopa (SINEMET)  MG per         tablet           (F31.9) Bipolar 1 disorder (H)    Decrease artane from 2 mg TID to 1 mg TID then add carbidopa-levodopa BID for 7 days then discontinue artane.    For all new medications pt was instructed on the dose, route, action, and potential side effects. Pt verbalized understanding.

## 2018-01-16 NOTE — PROGRESS NOTES
Progress Note    Client Name: Tamera Kumar  Date: January 16, 2018         Service Type: Individual      Session Start Time: 0930  Session End Time: 1030      Session Length: 60 minutes     Session #: 2     Attendees: Client     DSM V Dx: Depression & Anxiety    DA Date: 01/16/18    Treatment Plan Due: 04/16/18  PHQ-9 :   PHQ-9 SCORE 1/5/2018 1/12/2018 1/16/2018   Total Score 24 27 22      BRIGID-7 :    BRIGID-7 SCORE 1/5/2018 1/12/2018 1/16/2018   Total Score 17 21 15           DATA      Progress Since Last Session (Related to Symptoms / Goals / Homework):   Symptoms: Stable    Homework: Partially completed     Current / Ongoing Stressors and Concerns:   Physical health and struggle with her beliefs     Treatment Objective(s) Addressed in This Session:   To see things more optimistically     Intervention:   Looking at what is going right rather than focus on what is going wrong     Response to Interventions:   Skepical     ASSESSMENT: Current Emotional / Mental Status (status of significant symptoms):   Risk status (Self / Other harm or suicidal ideation)   Client denies current fears or concerns for personal safety.   Client denies current or recent suicidal ideation or behaviors.   Client denies current or recent homicidal ideation or behaviors.   Client denies current or recent self injurious behavior or ideation.   Client denies other safety concerns.   A safety and risk management plan has not been developed at this time, however client was given the after-hours number / 911 should there be a change in any of these risk factors.     Appearance:   Disheveled    Eye Contact:   Fair    Psychomotor Behavior: Normal    Attitude:   Guarded    Orientation:   All   Speech    Rate / Production: Slow     Volume:  Soft    Mood:    Anxious  Depressed    Affect:    Lethargic    Thought Content:  Clear    Thought Form:  Blocking  Obsessive    Insight:    Fair         Medication  Compliance:   NA     Changes in Health Issues:   None reported     Chemical Use Review:   Substance Use: Chemical use reviewed, no active concerns identified      Tobacco Use: No current tobacco use.       Collateral Reports Completed:   Not Applicable    PLAN: (Client Tasks / Therapist Tasks / Other)  To list positive things in her life and to discuss her beliefs with China Delacruz. Has been watching TV Wes which lead her to believe that she should be cured but hasn't been therefore she is angery at self for not having a stronger obi?                                                                                                                                                                        Adult Intake Structured Interview  Standard Diagnostic Assessment      CLIENT'S NAME: Tamera Kumar  MRN:   [id number]  :   1940  ACCT. NUMBER: [id number]  DATE OF SERVICE: 18      Identifying Information:  Client is a 77 year old, ,  female. Client was referred for counseling by their [facility] Therapist. Client is currently unemployed. Client attended the session alone.       Client's Statement of Presenting Concern:  Client reports the reason for seeking therapy at this time as Counselor leaving and has difficulty with spirituality.  Client stated that her symptoms have resulted in the following functional impairments: self-care      History of Presenting Concern:  Client reports that these problem(s) began many years ago. Client has attempted to resolve these concerns in the past through [facility] and counseling. Client reports that other professional(s) are not involved in providing support / services.       Social History:  Client reported she grew up in [place], [place]. They were the fourth born of four children. This is an intact family and parents remain . Client reported that her childhood was good. Client  described her current relationships with family of origin as good.    Client reported a history of two+ committed relationships or marriages. Client has been  for 17 years. Client reported having two children. Client identified few stable and meaningful social connections. Client reported that she has not been involved with the legal system.  Client's highest education level was some college. Client did not identify any learning problems. There are no ethnic, cultural or Zoroastrian factors that may be relevant for therapy. Client identified her preferred language to be English. Client reported she does not need the assistance of an  or other support involved in therapy. Modifications will not be used to assist communication in therapy. Client did not serve in the .     Client reports family history is negative for Family History Negative.    Mental Health History:  Client reported the following biological family members or relatives with mental health issues: Mother experienced Depression and suicidial and Brother experienced Depression.  Client previously received the following mental health diagnosis: Depression.  Client has received the following mental health services in the past: counseling.  Hospitalizations: None.  Client is not currently receiving any mental health services.      Chemical Health History:  Client reported no family history of chemical health issues. Client has not received chemical dependency treatment in the past. Client is not currently receiving any chemical dependency treatment. Client reports no problems as a result of their drinking / drug use.      Client Reports:  Client denies using alcohol.  Client denies using tobacco.  Client denies using marijuana.  Client reports using caffeine 3 times per day and drinks 1 at a time. Client started using caffeine at age ?.  Client denies using street drugs.  Client denies the non-medical use of prescription or over the  counter drugs.    CAGE: None of the patient's responses to the CAGE screening were positive / Negative CAGE score   Based on the negative Cage-Aid score and clinical interview there  are not indications of drug or alcohol abuse.    Discussed the general effects of drugs and alcohol on health and well-being. Therapist gave client printed information about the effects of chemical use on her health and well being.      Significant Losses / Trauma / Abuse / Neglect Issues:  There are no indications or report of: significant losses, trauma, abuse or neglect.    Issues of possible neglect are not present.      Medical Issues:  Client has had a physical exam to rule out medical causes for current symptoms. Date of last physical exam was within the past year. Client was encouraged to follow up with PCP if symptoms were to develop. The client has a non-[facility] Primary Care Provider. Their PCP is Cristo Cook.. The client reports not having a psychiatrist. Client reports no current medical concerns. The client denies the presence of chronic or episodic pain. There are not significant nutritional concerns.     Medication Adherence:  N/A - Client does not have prescribed psychiatric medications.    Client was provided recommendation to follow-up with prescribing physician.    Mental Status Assessment:  Appearance:   Appropriate   Eye Contact:   Fair   Psychomotor Behavior: Normal   Attitude:   Cooperative   Orientation:   All  Speech   Rate / Production: Impoverished  Pressured    Volume:  Soft   Mood:    Anxious  Depressed   Affect:    Appropriate   Thought Content:  Clear   Thought Form:  Coherent  Logical   Insight:    Fair       Review of Symptoms:  Depression: Sleep Interest Concentration Hopeless  Estrella:  No symptoms none  Psychosis: No symptoms  Anxiety: Worries Nervousness  Panic:  No symptoms  Post Traumatic Stress Disorder: No symptoms  Obsessive Compulsive Disorder: No symptoms  Eating Disorder: No  symptoms  Oppositional Defiant Disorder: No symptoms  ADD / ADHD: No symptoms  Conduct Disorder: No symptoms      Safety Assessment:    History of Safety Concerns:   Client denied a history of suicidal ideation.    Client denied a history of suicide attempts.    Client denied a history of homicidal ideation.    Client denied a history of self-injurious ideation and behaviors.    Client denied a history of personal safety concerns.    Client denied a history of assaultive behaviors.        Current Safety Concerns:  Client denies current suicidal ideation.    Client denies current homicidal ideation and behaviors.  Client denies current self-injurious ideation and behaviors.    Client denies current concerns for personal safety.      Client reports there are no firearms in the house.     Plan for Safety and Risk Management:  A safety and risk management plan has not been developed at this time, however client was given the after-hours number / 911 should there be a change in any of these risk factors.    Client's Strengths and Limitations:  Client identified the following strengths or resources that will help her succeed in counseling: obi / spirituality and friends / good social support. Client identified the following supports: Yarsanism / spirituality. Things that may interfere with the client's success in counseling include: few friends and transportation concerns.      Diagnostic Criteria:  A. Depressed mood for most of the day, for more days than not, as indicated either by subjective account or observation by others, for at least 2 years. Note: In children and adolescents, mood can be irritable and duration must be at least 1 year.   B. Presence, while depressed, of two (or more) of the following:        - poor appetite or overeating        - insomnia or hypersomnia       - low energy or fatigue        - low self-esteem        - poor concentration or difficulty making decisions   C. During the 2-year period  (1 year for children or adolescents) of the disturbance, the person has never been without the symptoms in Criteria A and B for more than 2 months at a time.      Functional Status:  Client's symptoms have caused reduced functional status in the following areas: Activities of Daily Living - unable to concentrate      DSM5 Diagnoses: (Sustained by DSM5 Criteria Listed Above)  Diagnoses: 300.4 (F34.1) Persistent Depressive Disorder, Moderate  Psychosocial & Contextual Factors: Being alone  Attendance Agreement:  Client has signed Attendance Agreement:Yes      Collaboration:  Collaboration with other professionals is not indicated at this time.      Preliminary Treatment Plan:  The client reports no currently identified Congregation, ethnic or cultural issues relevant to therapy.     services are not indicated.    Modifications to assist communication are not indicated.    The concerns identified by the client will be addressed in therapy.    Initial Treatment will focus on: Depressed Mood - by reading more positive books.    As a preliminary treatment goal, client will experience a reduction in depressed mood, will develop more effective coping skills to manage depressive symptoms and will increase ability to function adaptively.    The focus of initial interventions will be to alleviate anxiety and alleviate depressed mood.    Referral to another professional/service is not indicated at this time..    A Release of Information is not needed at this time.    Report to child / adult protection services was NA.    Client will have access to their [facility]' medical record.    FOSTER Kent  January 9, 2018

## 2018-01-16 NOTE — MR AVS SNAPSHOT
"              After Visit Summary   1/16/2018    Tamera Kumar    MRN: [id number]           Patient Information     Date Of Birth          1940        Visit Information        Provider Department      1/16/2018 9:30 AM Lei Bolton LMFT [facility]        Today's Diagnoses     Anxiety and depression    -  1       Follow-ups after your visit        Your next 10 appointments already scheduled     Jan 23, 2018  9:30 AM CST   (Arrive by 9:15 AM)   Return Visit with FOSTER Kent   [facility] ([facility] )    [address] [place] [id number] [phone]            Feb 09, 2018  2:45 PM CST   (Arrive by 2:30 PM)   Return Visit with IAN Redd [facility] ([facility] )    [address] [place] [id number] [phone]              Who to contact     If you have questions or need follow up information about today's clinic visit or your schedule please contact [facility] directly at [phone].  Normal or non-critical lab and imaging results will be communicated to you by MyChart, letter or phone within 4 business days after the clinic has received the results. If you do not hear from us within 7 days, please contact the clinic through [facility] or phone. If you have a critical or abnormal lab result, we will notify you by phone as soon as possible.  Submit refill requests through [facility] or call your pharmacy and they will forward the refill request to us. Please allow 3 business days for your refill to be completed.          Additional Information About Your Visit        MyChart Information     [facility] lets you send messages to your doctor, view your test results, renew your prescriptions, schedule appointments and more. To sign up, go to [email or web address] . Click on \"Log in\" on the left side of the screen, which will take you to the Welcome page. Then click on \"Sign up " "Now\" on the right side of the page.     You will be asked to enter the access code listed below, as well as some personal information. Please follow the directions to create your username and password.     Your access code is: [email or web address]  Expires: 2018 12:59 AM     Your access code will  in 90 days. If you need help or a new code, please call your [facility] or [phone].        Care EveryWhere ID     This is your Care EveryWhere ID. This could be used by other organizations to access your [place] medical records  [id number]         Blood Pressure from Last 3 Encounters:   18 112/62   17 130/56   17 110/50    Weight from Last 3 Encounters:   18 112 lb (50.8 kg)   17 112 lb (50.8 kg)   09/15/17 114 lb (51.7 kg)              Today, you had the following     No orders found for display       Primary Care Provider Office Phone # Fax #    Keith Lemons [phone]       [facility] [address] [facility] [address]        Equal Access to Services     [facility] : Hadii michael Mckenzie, waaxda lumarcie, qaybta kaalmada adediannayada, melania crook . So [facility] [phone].    ATENCIÓN: Si habla español, tiene a pittman disposición servicios gratuitos de asistencia lingüística. LlSelect Medical [facility], [place] [phone].    We comply with applicable federal civil rights laws and [place] laws. We do not discriminate on the basis of race, color, national origin, age, disability, sex, sexual orientation, or gender identity.            Thank you!     Thank you for choosing [facility]  for your care. Our goal is always to provide you with excellent care. Hearing back from our patients is one way we can continue to improve our services. Please take a few minutes to complete the written survey that you may receive in the mail after your visit with us. Thank you!             Your Updated Medication List - Protect others around you: " Learn how to safely use, store and throw away your medicines at www.disposemymeds.org.          This list is accurate as of: 1/16/18 10:42 AM.  Always use your most recent med list.                   Brand Name Dispense Instructions for use Diagnosis    ACETAMINOPHEN PO      Take 325 mg by mouth every 6 hours as needed for pain        albuterol 108 (90 BASE) MCG/ACT Inhaler    PROAIR HFA/PROVENTIL HFA/VENTOLIN HFA     Inhale 2 puffs into the lungs every 6 hours        amLODIPine 5 MG tablet    NORVASC    30 tablet    Take 1 tablet (5 mg) by mouth daily    Essential hypertension       bisacodyl 10 MG Suppository    DULCOLAX     Place 10 mg rectally daily as needed for constipation        buPROPion 300 MG 24 hr tablet    WELLBUTRIN XL    30 tablet    take one tablet by mouth every morning.    Severe episode of recurrent major depressive disorder, without psychotic features (H)       carbidopa-levodopa  MG per tablet    SINEMET    60 tablet    Take 1 tablet by mouth 2 times daily    Tardive dyskinesia       cholecalciferol 5000 UNITS Caps     30 capsule    Take 1 capsule (5,000 Units) by mouth daily    Bipolar 1 disorder (H)       CLONIDINE HCL PO      Take 0.1 mg by mouth 2 times daily        donepezil 10 MG tablet    ARICEPT    30 tablet    Take 1 tablet (10 mg) by mouth At Bedtime    Vascular dementia with behavior disturbance       DULoxetine 30 MG EC capsule    CYMBALTA    90 capsule    Take 1 capsule daily    Recurrent major depressive disorder, in partial remission (H)       gabapentin 100 MG capsule    NEURONTIN    90 capsule    Take 1 capsule (100 mg) by mouth 3 times daily    Anxiety       levothyroxine 75 MCG tablet    SYNTHROID/LEVOTHROID    30 tablet    Take 1 tablet (75 mcg) by mouth daily    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 substances       memantine 10 MG tablet    NAMENDA    30 tablet    Take 1 tablet (10 mg) by mouth daily    Vascular dementia with behavior disturbance        MIRALAX Packet   Generic drug:  polyethylene glycol      Take 17 g by mouth daily        multivitamin, therapeutic with minerals Tabs tablet     30 each    Take 1 tablet by mouth daily    Bipolar 1 disorder (H)       PRILOSEC PO      Take 20 mg by mouth every morning        senna-docusate 8.6-50 MG per tablet    SENOKOT-S;PERICOLACE     Take 1 tablet by mouth daily        tiotropium 18 MCG capsule    SPIRIVA     Inhale 18 mcg into the lungs daily        * TRAZODONE HCL PO      Take 100 mg by mouth nightly as needed for sleep        * traZODone 100 MG tablet    DESYREL    30 tablet    Take 1 tablet (100 mg) by mouth At Bedtime    Recurrent major depressive disorder, in partial remission (H)       * Notice:  This list has 2 medication(s) that are the same as other medications prescribed for you. Read the directions carefully, and ask your doctor or other care provider to review them with you.

## 2018-01-23 NOTE — ED PROVIDER NOTES
eMERGENCY dEPARTMENT eNCOUnter      CHIEF COMPLAINT    Chief Complaint   Patient presents with     Nausea     Dizziness       HPI    Tamera Kumar is a 77 year old female who presents with nausea that started while she was being transported here by [facility] for her behavioral health appointment.  When she arrived here she complained of nausea and dizziness and was sent to the emergency department.  She says now she feels completely fine and declines any treatment or medication.  She just has to go to the bathroom.    REVIEW OF SYSTEMS    GI: No hematochezia, see HPI  Cardiac: No chest pain or syncope  Pulmonary: No difficulty breathing or new cough  General: No fevers or chills  : No hematuria or dysuria  See HPI for further details.   All other systems reviewed and are negative.    PAST MEDICAL & SURGICAL HISTORY    Past Medical History:   Diagnosis Date     Anemia      Bipolar disorder (H)      CAD in native artery      Constipation      GERD (gastroesophageal reflux disease)      H/O medication noncompliance      Histrionic personality disorder      HTN (hypertension)      Hypothyroid      Stress incontinence      Vitamin B 12 deficiency      Past Surgical History:   Procedure Laterality Date     CARPAL TUNNEL RELEASE RT/LT       CATARACT IOL, RT/LT       HYSTERECTOMY         CURRENT MEDICATIONS    Current Outpatient Rx   Medication Sig Dispense Refill     tiotropium (SPIRIVA) 18 MCG capsule Inhale 18 mcg into the lungs daily       albuterol (PROAIR HFA/PROVENTIL HFA/VENTOLIN HFA) 108 (90 BASE) MCG/ACT Inhaler Inhale 2 puffs into the lungs every 6 hours       bisacodyl (DULCOLAX) 10 MG Suppository Place 10 mg rectally daily as needed for constipation       carbidopa-levodopa (SINEMET)  MG per tablet Take 1 tablet by mouth 2 times daily 60 tablet 3     buPROPion (WELLBUTRIN XL) 300 MG 24 hr tablet take one tablet by mouth every morning. 30 tablet 0     polyethylene glycol (MIRALAX) Packet Take 17 g by  "mouth daily       TRAZODONE HCL PO Take 100 mg by mouth nightly as needed for sleep       donepezil (ARICEPT) 10 MG tablet Take 1 tablet (10 mg) by mouth At Bedtime 30 tablet 8     gabapentin (NEURONTIN) 100 MG capsule Take 1 capsule (100 mg) by mouth 3 times daily 90 capsule 0     senna-docusate (SENOKOT-S;PERICOLACE) 8.6-50 MG per tablet Take 1 tablet by mouth daily       ACETAMINOPHEN PO Take 325 mg by mouth every 6 hours as needed for pain       DULoxetine (CYMBALTA) 30 MG EC capsule Take 1 capsule daily 90 capsule 3     CLONIDINE HCL PO Take 0.1 mg by mouth 2 times daily       levothyroxine (SYNTHROID/LEVOTHROID) 75 MCG tablet Take 1 tablet (75 mcg) by mouth daily 30 tablet 0     traZODone (DESYREL) 100 MG tablet Take 1 tablet (100 mg) by mouth At Bedtime 30 tablet 3     Omeprazole (PRILOSEC PO) Take 20 mg by mouth every morning       amLODIPine (NORVASC) 5 MG tablet Take 1 tablet (5 mg) by mouth daily 30 tablet 3     memantine (NAMENDA) 10 MG tablet Take 1 tablet (10 mg) by mouth daily 30 tablet 0     multivitamin, therapeutic with minerals (THERA-VIT-M) TABS Take 1 tablet by mouth daily 30 each 0     cholecalciferol 5000 UNITS CAPS Take 1 capsule (5,000 Units) by mouth daily 30 capsule 0       ALLERGIES    Allergies   Allergen Reactions     Benadryl [Diphenhydramine] Unknown     Patient states she is allergic to benadryl     Depakote      Escitalopram      Suicidal ideation     Famotidine      Fluoxetine      Suicidal ideation     Hydrocodone      Lisinopril      swelling     Lithium Swelling     Other [Seasonal Allergies]      Pet hair       Penicillins Nausea     Ramelteon      Complains of orgasm feeling     Risperdal [Risperidone] Unknown     Patient states she is allergic to risperdal       Valproic Acid Unknown     \"seizures\"       SOCIAL AND FAMILY HISTORY    Social History     Social History     Marital status:      Spouse name: N/A     Number of children: 0     Years of education: N/A " "    Occupational History      Homemaker     Social History Main Topics     Smoking status: Former Smoker     Packs/day: 0.25     Types: Cigarettes     Smokeless tobacco: Never Used     Alcohol use No     Drug use: No     Sexual activity: Not Currently     Other Topics Concern     None     Social History Narrative     Family History   Problem Relation Age of Onset     Family History Negative No family hx of        PHYSICAL EXAM    VITAL SIGNS: /94  Pulse 64  Temp 98  F (36.7  C) (Oral)  Resp 18  Ht 1.562 m (5' 1.5\")  Wt 47.6 kg (105 lb)  SpO2 97%  BMI 19.52 kg/m2  Constitutional:  Well developed, well nourished  Eyes:  Sclera nonicteric, conjunctiva moist  HENT:  Atraumatic, nose normal  Neck: Supple, no JVD  Respiratory:  No retractions, no accessory muscle use, normal breath sounds   Cardiovascular: Regular rate, normal rhythm, no murmurs  GI:  Soft, no abdominal tenderness, no guarding, bowel sounds present, no audible bruits or palpable pulsatile masses.  Musculoskeletal:  No edema, no acute deformities  Integument: No rash, dry skin  Neurologic:  Alert & oriented, no slurred speech      ED COURSE & MEDICAL DECISION MAKING    Pertinent Labs reviewed and interpreted. (See chart for details)   See chart for details of medications given during the ED stay.    Vitals:    01/23/18 1030 01/23/18 1045 01/23/18 1052 01/23/18 1053   BP: 151/83 155/88 147/94    Pulse:       Resp: 15  18    Temp:   98  F (36.7  C)    TempSrc:   Oral    SpO2:   99% 97%   Weight:       Height:               FINAL IMPRESSION    Nausea, resolved    PLAN: Outpatient medications and follow up.  The patient declines any further workup or care here.  She is  discharged back to her assisted living.           Zofia Roberts MD  01/23/18 1712    "

## 2018-01-23 NOTE — ED AVS SNAPSHOT
[facility]    [address] [address]    Phone:  [phone]                                       Tamera Kumar   MRN: [id number]    Department:  [facility]   Date of Visit:  1/23/2018           Patient Information     Date Of Birth          1940        Your diagnoses for this visit were:     Nausea        You were seen by Zofia Roberts MD.      Future Appointments        Provider Department Dept Phone Center    2/9/2018 2:45 PM IAN Robles [facility] [phone] [facility]         Review of your medicines      Our records show that you are taking the medicines listed below. If these are incorrect, please call your family doctor or clinic.        Dose / Directions Last dose taken    ACETAMINOPHEN PO   Dose:  325 mg        Take 325 mg by mouth every 6 hours as needed for pain   Refills:  0        albuterol 108 (90 BASE) MCG/ACT Inhaler   Commonly known as:  PROAIR HFA/PROVENTIL HFA/VENTOLIN HFA   Dose:  2 puff        Inhale 2 puffs into the lungs every 6 hours   Refills:  0        amLODIPine 5 MG tablet   Commonly known as:  NORVASC   Dose:  5 mg   Quantity:  30 tablet        Take 1 tablet (5 mg) by mouth daily   Refills:  3        bisacodyl 10 MG Suppository   Commonly known as:  DULCOLAX   Dose:  10 mg        Place 10 mg rectally daily as needed for constipation   Refills:  0        buPROPion 300 MG 24 hr tablet   Commonly known as:  WELLBUTRIN XL   Quantity:  30 tablet        take one tablet by mouth every morning.   Refills:  0        carbidopa-levodopa  MG per tablet   Commonly known as:  SINEMET   Dose:  1 tablet   Quantity:  60 tablet        Take 1 tablet by mouth 2 times daily   Refills:  3        cholecalciferol 5000 UNITS Caps   Dose:  5000 Units   Quantity:  30 capsule        Take 1 capsule (5,000 Units) by mouth daily   Refills:  0        CLONIDINE HCL PO   Dose:  0.1 mg        Take 0.1 mg by mouth 2 times  daily   Refills:  0        donepezil 10 MG tablet   Commonly known as:  ARICEPT   Quantity:  30 tablet        Take 1 tablet (10 mg) by mouth At Bedtime   Refills:  8        DULoxetine 30 MG EC capsule   Commonly known as:  CYMBALTA   Quantity:  90 capsule        Take 1 capsule daily   Refills:  3        gabapentin 100 MG capsule   Commonly known as:  NEURONTIN   Quantity:  90 capsule        Take 1 capsule (100 mg) by mouth 3 times daily   Refills:  0        levothyroxine 75 MCG tablet   Commonly known as:  SYNTHROID/LEVOTHROID   Dose:  75 mcg   Quantity:  30 tablet        Take 1 tablet (75 mcg) by mouth daily   Refills:  0        memantine 10 MG tablet   Commonly known as:  NAMENDA   Dose:  10 mg   Quantity:  30 tablet        Take 1 tablet (10 mg) by mouth daily   Refills:  0        MIRALAX Packet   Dose:  17 g   Generic drug:  polyethylene glycol        Take 17 g by mouth daily   Refills:  0        multivitamin, therapeutic with minerals Tabs tablet   Dose:  1 tablet   Quantity:  30 each        Take 1 tablet by mouth daily   Refills:  0        PRILOSEC PO   Dose:  20 mg        Take 20 mg by mouth every morning   Refills:  0        senna-docusate 8.6-50 MG per tablet   Commonly known as:  SENOKOT-S;PERICOLACE   Dose:  1 tablet        Take 1 tablet by mouth daily   Refills:  0        tiotropium 18 MCG capsule   Commonly known as:  SPIRIVA   Dose:  18 mcg        Inhale 18 mcg into the lungs daily   Refills:  0        * TRAZODONE HCL PO   Dose:  100 mg        Take 100 mg by mouth nightly as needed for sleep   Refills:  0        * traZODone 100 MG tablet   Commonly known as:  DESYREL   Dose:  100 mg   Quantity:  30 tablet        Take 1 tablet (100 mg) by mouth At Bedtime   Refills:  3        * Notice:  This list has 2 medication(s) that are the same as other medications prescribed for you. Read the directions carefully, and ask your doctor or other care provider to review them with you.            Orders Needing  "Specimen Collection     None      Pending Results     No orders found from 2018 to 2018.            Pending Culture Results     No orders found from 2018 to 2018.            Thank you for choosing [facility]       Thank you for choosing [facility] for your care. Our goal is always to provide you with excellent care. Hearing back from our patients is one way we can continue to improve our services. Please take a few minutes to complete the written survey that you may receive in the mail after you visit with us. Thank you!        [employer] Information     [employer] lets you send messages to your doctor, view your test results, renew your prescriptions, schedule appointments and more. To sign up, go to [email or web address] . Click on \"Log in\" on the left side of the screen, which will take you to the Welcome page. Then click on \"Sign up Now\" on the right side of the page.     You will be asked to enter the access code listed below, as well as some personal information. Please follow the directions to create your username and password.     Your access code is: [email or web address]  Expires: 2018 12:59 AM     Your access code will  in 90 days. If you need help or a new code, please call your [facility] clinic or [phone].        Care EveryWhere ID     This is your Care EveryWhere ID. This could be used by other organizations to access your [facility] medical records  [id number]        Equal Access to Services     REGINE MO : Hadii michael foxo Sosera, waaxda luqadaha, qaybta kaalmada adediannayada, melania crook . So [facility] [phone].    ATENCIÓN: Si habla español, tiene a pittman disposición servicios gratuitos de asistencia lingüística. Llame al [phone].    We comply with applicable federal civil rights laws and [place] laws. We do not discriminate on the basis of race, color, national origin, age, disability, sex, sexual orientation, or gender identity.          "   After Visit Summary       This is your record. Keep this with you and show to your community pharmacist(s) and doctor(s) at your next visit.

## 2018-01-23 NOTE — ED AVS SNAPSHOT
[facility]    [address] [place] [address]    Phone:  [phone]                                       Tamera Kumar   MRN: [id number]    Department[facility]  [facility]   Date of Visit:  1/23/2018           After Visit Summary Signature Page     I have received my discharge instructions, and my questions have been answered. I have discussed any challenges I see with this plan with the nurse or doctor.    ..........................................................................................................................................  Patient/Patient Representative Signature      ..........................................................................................................................................  Patient Representative Print Name and Relationship to Patient    ..................................................               ................................................  Date                                            Time    ..........................................................................................................................................  Reviewed by Signature/Title    ...................................................              ..............................................  Date                                                            Time

## 2018-01-23 NOTE — ED NOTES
"In ED for \" getting nauseated and dizzy on the way to see her mental health therapist today at our clinic, used the [employer] transportation.\"  "

## 2018-02-08 PROBLEM — F29 PSYCHOSIS (H): Status: ACTIVE | Noted: 2018-01-01

## 2018-02-08 NOTE — ED NOTES
"Writer brought pt to bathroom. While helping pt in the bathroom she stated a list of peoples names such as her psychiatrist and nurses at [facility] that she stated she wants to kill because they \"haven't helped her how she needs them to\". Pt also stated \"someone needs to end my life and put me out of my misery, i'm in so much pain all the time\"  "

## 2018-02-08 NOTE — IP AVS SNAPSHOT
[facility]    [address] [place] [address]    Phone:  [phone]    Fax:  [phone]                                       After Visit Summary   2/8/2018    Tamera Kumar    MRN: [id number]           After Visit Summary Signature Page     I have received my discharge instructions, and my questions have been answered. I have discussed any challenges I see with this plan with the nurse or doctor.    ..........................................................................................................................................  Patient/Patient Representative Signature      ..........................................................................................................................................  Patient Representative Print Name and Relationship to Patient    ..................................................               ................................................  Date                                            Time    ..........................................................................................................................................  Reviewed by Signature/Title    ...................................................              ..............................................  Date                                                            Time

## 2018-02-08 NOTE — ED NOTES
"Pt presents to the ED via [employer].  Officer states he was asked to  pt from [facility] by Jennifer west  for pt acting aggressive towards staff.  Hitting and kicking.  Pt arrived ambulatory and went to room 8.  Swearing at staff \"fuck you\" multiple times.   Susie S in with pt trying to calm. States she has a paralyzed ileus.  States she is having a seizure when staff assisted to remove jacket.  Lying back on bed from sitting position.  Attempting to obtain VS.  Requests manual BP and staff will attempt.  Will not answer questions appropriately for triage.  Refuses sat monitor and states she has cut and bleeding fingers.  Assessed as able at this time.  Security in view of pt.   "

## 2018-02-08 NOTE — IP AVS SNAPSHOT
MRN:[id number]                      After Visit Summary   2/8/2018    Tamera Kumar    MRN: [id number]           Thank you!     Thank you for choosing [facility] for your care. Our goal is always to provide you with excellent care. Hearing back from our patients is one way we can continue to improve our services. Please take a few minutes to complete the written survey that you may receive in the mail after you visit with us. Thank you!        Patient Information     Date Of Birth          1940        Designated Caregiver       Most Recent Value    Caregiver    Will someone help with your care after discharge? yes    Name of designated caregiver Lives at [facility]    Phone number of caregiver [place]    Caregiver address [place]      About your hospital stay     You were admitted on:  February 8, 2018 You last received care in the: [facility]    You were discharged on:  February 12, 2018       Who to Call     For medical emergencies, please call 911.  For non-urgent questions about your medical care, please call your primary care provider or clinic, [phone]          Attending Provider     Provider Specialty    Mary Alice Rivas PA-C Emergency Medicine    Stefano Moncada MD Psychiatry    Genia Delgadillo Mc, APRN CNP Nurse Practitioner Psych/Mental Health       Primary Care Provider Office Phone # Fax #    Keith Lemons [phone]      Your next 10 appointments already scheduled     Apr 13, 2018  9:00 AM CDT   (Arrive by 8:45 AM)   Return Visit with IAN Redd CNP   [facility] [place] ([facility] [place] )    [address]  [place] [address] [phone]              Further instructions from your care team       Behavioral Discharge Planning and Instructions    Summary: Tamera was admitted to  due to suicidal ideation    Main Diagnosis: Bipolar I disorder, Suicidal ideation, Homicidal ideation, 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    Major Treatments, Procedures and Findings: Stabilize with medications, connect with community programs.    Symptoms to Report: feeling more aggressive, increased confusion, losing more sleep, mood getting worse or thoughts of suicide    Lifestyle Adjustment: Take all medications as prescribed, meet with doctor/ medication provider, out patient therapist,  as scheduled. Abstain from alcohol or any unprescribed drugs.    Psychiatry Follow-up:     [facility]  Med Management- Naomi Cook- 4/13 @8:45  [address] [address]  Phone:  [phone]    Fax: [phone]    [facility] - Alanna Rothmanbrit  [phone] - emergency contact number is [phone]    [facility] [place] [facility]  - Jennifer Giraldo  [address] [place] [address]  Phone:  [phone]   Fax - [phone]    Mariana Lang   [address] [place] [address]  Phone: [phone]  Fax: [phone]    [facility] - PCP Dr. Isaura Lemons - as arranged  [address] [place] [address]  Phone: [phone]   Fax: [phone]    Resources:   Crisis Intervention: 276.285.3493 or 002-688-3218 (TTY: 849.650.6602).  Call anytime for help.  National [place] on Mental Illness (www.[place].taylor.org): 701.851.6392 or 109-558-7443.  Alcoholics Anonymous (www.alcoholics-anonymous.org): Check your phone book for your local chapter.  Suicide Awareness Voices of Education (SAVE) (www.save.org): 516-626-YEJA (5121)  National Suicide Prevention Line (www.mentalhealthmn.org): 895-794-YTZH (7396)  Mental Health Consumer/Survivor Network of [place] (www.mhcsn.net): 810.499.6727 or 899-478-2475  Mental Health Association [facility]  (www.mentalhealth.org): 839.945.8792 or 643-764-0702    General Medication Instructions:   See your medication sheet(s) for instructions.   Take all medicines as directed.  Make no changes unless your doctor suggests them.   Go to all your doctor visits.  Be sure to have all your required lab tests. This way, your medicines can be refilled on time.  Do not use any drugs not prescribed by your doctor.  Avoid alcohol.    [place] Area:  [facility], Crisis stabilization [facility]- [phone]  [facility] Crisis Line: 1-971.555.6929  Advocates For Family Peace: 543-7325  Sexual Assault Program [facility]: 371.348.3065 or 1-474.436.5053  [place] Battered Women's Program: 1-920.207.2139 Ext: 279       Calls answered Mon-Fri-8:00 am--4:30 pm    [place]:  Advocates for Family Peace: 1-706.921.2350  [facility] first call for help: 1-395.583.6339  [facility] Counseling Crisis Center:  ([phone]      [place] Area:  Warm Line: 1-351.401.8898       Calls answered Tuesday--Saturday 4:00 pm--10:00 pm  [facility] Crisis Line - [phone]  [place] [place] Crisis Stabilization 518-563-2468    [place] Statewide:  [place] Crisis and Referral Services: 1-390.721.8020  National Suicide Prevention Lifeline: 6-283-150-TALK (0713)   - sov0yqiw- Text  Life  to [address]  First Call for Help: 2-1-1  [facility] Helpline- 2-088-ULGU-HELP        Pending Results     No orders found from 2/6/2018 to 2/9/2018.            Statement of Approval     Ordered          02/12/18 0910  I have reviewed and agree with all the recommendations and orders detailed in this document.  EFFECTIVE NOW     Approved and electronically signed by:  Genia Delgadillo Mc, APRN CNP             Admission Information     Date & Time Provider Department Dept. Phone    2/8/2018 Genia Delgadillo Mc, APRN CNP [facility] [phone]      Your Vitals Were     Blood Pressure Pulse Temperature Respirations Weight Pulse Oximetry    123/70 83 97.8  F (36.6  C) (Oral) 16 48.5 kg (107  "lb) 99%    BMI (Body Mass Index)                   19.89 kg/m2           [employer] Information     [employer] lets you send messages to your doctor, view your test results, renew your prescriptions, schedule appointments and more. To sign up, go to [email or web address] . Click on \"Log in\" on the left side of the screen, which will take you to the Welcome page. Then click on \"Sign up Now\" on the right side of the page.     You will be asked to enter the access code listed below, as well as some personal information. Please follow the directions to create your username and password.     Your access code is: [email or web address]  Expires: 2018 12:59 AM     Your access code will  in 90 days. If you need help or a new code, please call your [facility] clinic or [phone].        Care EveryWhere ID     This is your Care EveryWhere ID. This could be used by other organizations to access your [facility] medical records  [id number]        Equal Access to Services     REGINE [place] : Hadii michael foxo Sosera, waaxda luqadaha, qaybta kaalmada adecandido, melania crook . So [facility] [phone].    ATENCIÓN: Si habla español, tiene a pittman disposición servicios gratuitos de asistencia lingüística. Llame al [phone].    We comply with applicable federal civil rights laws and [place] laws. We do not discriminate on the basis of race, color, national origin, age, disability, sex, sexual orientation, or gender identity.               Review of your medicines      CONTINUE these medicines which may have CHANGED, or have new prescriptions. If we are uncertain of the size of tablets/capsules you have at home, strength may be listed as something that might have changed.        Dose / Directions    amLODIPine 5 MG tablet   Commonly known as:  NORVASC   This may have changed:  additional instructions        Dose:  5 mg   Take 1 tablet (5 mg) by mouth daily   Quantity:  30 tablet   Refills:  3       " cholecalciferol 5000 UNITS Caps   This may have changed:  how much to take   Used for:  Bipolar 1 disorder (H)        Dose:  5000 Units   Take 1 capsule (5,000 Units) by mouth daily   Quantity:  30 capsule   Refills:  0       traZODone 100 MG tablet   Commonly known as:  DESYREL   This may have changed:  Another medication with the same name was removed. Continue taking this medication, and follow the directions you see here.   Used for:  Recurrent major depressive disorder, in partial remission (H)        Dose:  100 mg   Take 1 tablet (100 mg) by mouth At Bedtime   Quantity:  30 tablet   Refills:  3         CONTINUE these medicines which have NOT CHANGED        Dose / Directions    ACETAMINOPHEN PO        Dose:  650 mg   Take 650 mg by mouth every 6 hours as needed for pain   Refills:  0       bisacodyl 10 MG Suppository   Commonly known as:  DULCOLAX        Dose:  10 mg   Place 10 mg rectally daily as needed for constipation   Refills:  0       CLONIDINE HCL PO        Dose:  0.1 mg   Take 0.1 mg by mouth 2 times daily Patient takes at 8 A.M. And 9 P.M.   Refills:  0       donepezil 10 MG tablet   Commonly known as:  ARICEPT   Used for:  Vascular dementia with behavior disturbance        Take 1 tablet (10 mg) by mouth At Bedtime   Quantity:  30 tablet   Refills:  8       DULoxetine 30 MG EC capsule   Commonly known as:  CYMBALTA   Used for:  Recurrent major depressive disorder, in partial remission (H)        Take 1 capsule daily   Quantity:  90 capsule   Refills:  3       gabapentin 100 MG capsule   Commonly known as:  NEURONTIN   Used for:  Anxiety        Take 1 capsule (100 mg) by mouth 3 times daily   Quantity:  90 capsule   Refills:  0       levothyroxine 75 MCG tablet   Commonly known as:  SYNTHROID/LEVOTHROID   Used for:  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 substances        Dose:  75 mcg   Take 1 tablet (75 mcg) by mouth daily   Quantity:  30 tablet   Refills:  0       memantine 10 MG tablet    Commonly known as:  NAMENDA   Used for:  Vascular dementia with behavior disturbance        Dose:  10 mg   Take 1 tablet (10 mg) by mouth daily   Quantity:  30 tablet   Refills:  0       multivitamin, therapeutic with minerals Tabs tablet   Used for:  Bipolar 1 disorder (H)        Dose:  1 tablet   Take 1 tablet by mouth daily   Quantity:  30 each   Refills:  0       PRILOSEC PO        Dose:  20 mg   Take 20 mg by mouth every morning   Refills:  0       senna-docusate 8.6-50 MG per tablet   Commonly known as:  SENOKOT-S;PERICOLACE        Dose:  1 tablet   Take 1 tablet by mouth daily   Refills:  0         STOP taking     buPROPion 300 MG 24 hr tablet   Commonly known as:  WELLBUTRIN XL           carbidopa-levodopa  MG per tablet   Commonly known as:  SINEMET                    Protect others around you: Learn how to safely use, store and throw away your medicines at www.disposemymeds.org.             Medication List: This is a list of all your medications and when to take them. Check marks below indicate your daily home schedule. Keep this list as a reference.      Medications           Morning Afternoon Evening Bedtime As Needed    ACETAMINOPHEN PO   Take 650 mg by mouth every 6 hours as needed for pain   Last time this was given:  650 mg on 2/12/2018  8:37 AM                                amLODIPine 5 MG tablet   Commonly known as:  NORVASC   Take 1 tablet (5 mg) by mouth daily   Last time this was given:  5 mg on 2/11/2018  8:24 PM                                bisacodyl 10 MG Suppository   Commonly known as:  DULCOLAX   Place 10 mg rectally daily as needed for constipation                                cholecalciferol 5000 UNITS Caps   Take 1 capsule (5,000 Units) by mouth daily   Last time this was given:  5,000 Units on 2/12/2018  8:58 AM                                CLONIDINE HCL PO   Take 0.1 mg by mouth 2 times daily Patient takes at 8 A.M. And 9 P.M.   Last time this was given:  0.1 mg on  2/12/2018  8:59 AM                                donepezil 10 MG tablet   Commonly known as:  ARICEPT   Take 1 tablet (10 mg) by mouth At Bedtime   Last time this was given:  10 mg on 2/11/2018  8:23 PM                                DULoxetine 30 MG EC capsule   Commonly known as:  CYMBALTA   Take 1 capsule daily   Last time this was given:  30 mg on 2/12/2018  8:58 AM                                gabapentin 100 MG capsule   Commonly known as:  NEURONTIN   Take 1 capsule (100 mg) by mouth 3 times daily   Last time this was given:  100 mg on 2/11/2018  8:23 PM                                levothyroxine 75 MCG tablet   Commonly known as:  SYNTHROID/LEVOTHROID   Take 1 tablet (75 mcg) by mouth daily   Last time this was given:  75 mcg on 2/12/2018  8:00 AM                                memantine 10 MG tablet   Commonly known as:  NAMENDA   Take 1 tablet (10 mg) by mouth daily   Last time this was given:  10 mg on 2/12/2018  8:59 AM                                multivitamin, therapeutic with minerals Tabs tablet   Take 1 tablet by mouth daily   Last time this was given:  1 tablet on 2/12/2018  8:58 AM                                PRILOSEC PO   Take 20 mg by mouth every morning   Last time this was given:  20 mg on 2/12/2018  8:00 AM                                senna-docusate 8.6-50 MG per tablet   Commonly known as:  SENOKOT-S;PERICOLACE   Take 1 tablet by mouth daily                                traZODone 100 MG tablet   Commonly known as:  DESYREL   Take 1 tablet (100 mg) by mouth At Bedtime   Last time this was given:  50 mg on 2/11/2018  8:25 PM

## 2018-02-09 PROBLEM — N17.9 ACUTE RENAL FAILURE SUPERIMPOSED ON CHRONIC KIDNEY DISEASE (H): Status: ACTIVE | Noted: 2018-01-01

## 2018-02-09 PROBLEM — F09 COGNITIVE DISORDER: Status: ACTIVE | Noted: 2018-01-01

## 2018-02-09 PROBLEM — N18.9 ACUTE RENAL FAILURE SUPERIMPOSED ON CHRONIC KIDNEY DISEASE (H): Status: ACTIVE | Noted: 2018-01-01

## 2018-02-09 PROBLEM — G30.9 DEMENTIA IN ALZHEIMER'S DISEASE WITH DELUSIONS (H): Status: ACTIVE | Noted: 2018-01-01

## 2018-02-09 PROBLEM — F02.82 DEMENTIA IN ALZHEIMER'S DISEASE WITH DELUSIONS (H): Status: ACTIVE | Noted: 2018-01-01

## 2018-02-09 PROBLEM — I50.9 CHF (CONGESTIVE HEART FAILURE) (H): Status: ACTIVE | Noted: 2018-01-01
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
## 2018-02-09 NOTE — PLAN OF CARE
Consent to treat was obtained by pt guardian through the [facility] number is [phone]
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
## 2018-02-09 NOTE — PLAN OF CARE
Face to face end of shift report received from Concepcion LEO RN. Rounding completed. Patient observed sitting in bedroom awake. No requests at this time.     Sophy Moe  2/9/2018  3:54 PM
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
## 2018-02-09 NOTE — ED NOTES
Per TC from [place] at [facility], pt has been accepted on 5 north here at [facility] with Dr Moncada.

## 2018-02-09 NOTE — PLAN OF CARE
"Problem: Patient Care Overview  Goal: Individualization & Mutuality  Outcome: Declining  Poor sleep this noc - 3 hours at best - moods fluctuating from crying/weeping to being angry at a variety of things - administered tylenol 650 mg po for finger tip, bunion, and lower back pain - at 2359 and 0630 for pain she rates a \"seven\" also administered ativan 1 mg po at 0630 - it is now 0730 and pt continues to have outburst of tears and anger.  Will leave sticky note to request items pt requesting.        "

## 2018-02-09 NOTE — ED NOTES
Nurse nurse report given to Ruba RN with pt transferring to 5th floor at this time.  IV out and site is WDL.  Pt cooperative with security and UA on transfer. Personal belongings with pt.

## 2018-02-09 NOTE — PLAN OF CARE
Face to face end of shift report received from NATHALY Yeh. Rounding completed. Patient observed. Is awake in room has many requests - for a variety of items she is not allowed to have in the MHICU  I.e. Wheelchair, walker, shoes, dress., will leave a note for nurse practioner - is extremely labile - and manic.  Upset with all the changes that have occurred since she was here last - spent much time with her trying to listen and accomodate her needs.    Nabila Ac  2/9/2018  4:32 AM

## 2018-02-09 NOTE — PLAN OF CARE
Problem: Patient Care Overview  Goal: Team Discussion  Team Plan:   BEHAVIORAL TEAM DISCUSSION    Participants: Atilio Chaparro NP, Michelle Lowe NP, Genia Delgadillo NP,  Camilla Qureshi LICSW, Adry Saldivar LICSW, Shahla House Discharge Planner, Ivania RODRIGUEZ RN, Noris Grant OT, Christine Alcantar OT  Progress: New patient. Ok for shoes in MHICU.   Continued Stay Criteria/Rationale: Psychosis  Medical/Physical: bunions, Possible UTI, dementia  Precautions:   Behavioral Orders   Procedures     Code 1 - Restrict to Unit     Routine Programming     As clinically indicated     Self Injury Precaution     Status 15     Every 15 minutes.     Plan: stabilize on medication.   Rationale for change in precautions or plan: none
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
## 2018-02-09 NOTE — PLAN OF CARE
"Problem: Patient Care Overview  Goal: Individualization & Mutuality  Patient will be compliant with treatment team recommendations and medications.  Patient will sleep 6-8 hours per night.   Patient able to wear velcro shoes for bunions.   Patient will have reality based conversation by discharge.   Patient will be non aggressive towards staff and peers during hospitalization.   Patient will deny SI by discharge.    Outcome: No Change  Patient labile, irritable, unsteady on her feet at times. States she has shoes for her bunions, is able to have them now per provider. Admits to pain in both hands, states it is sharp at times, state she has had carpal tunnel surgeries in the past, and \"Tardive dyskinesia and trigger finger that the doctors have fixed\". Speech is rambling, flight of ideas, and tangential, stories jump back and forth and are repetitive and difficult to track at times. Is wearing a scrub top and jacket, a pillow case on her head, and a blanket around her waist and legs, states she can't wear bottoms due to them being too tight, states she \"had an ileus in the past, and lost a lot of weight\". States she wants her pink top to wear instead of the scrubs. States she is tired, that she has not slept for 3 days, states \"I will take a nap after I have breakfast, a Trazodone, and see the doctor\". Did eat breakfast out in the [facility] lounge. At 0945, states she is ready to sleep, that she believes \"the Ativan is working\", was helped into her bed, has been sleeping since.   Patient up for lunch, does have pants on and her shoes, is watching tv in the [facility] lounge. Is steady on her feet. Did take medications as prescribed, but refused to take Senna and Cymbalta, states \"you can deep 6 the Senna, it doesn't do shit\" and states she does not want to take an antidepressant.       "

## 2018-02-09 NOTE — PROGRESS NOTES
02/08/18 1951   Patient Belongings   Did you bring any home meds/supplements to the hospital?  No   Patient Belongings body jewelry;clothing;purse;glasses;watch;ring   Belongings Search Yes   Clothing Search Yes   Second Staff michelet   General Info Comment SENT TO SAFE:nothing    WITH PATIENT: two rings on right hand (silver and gold color with jewels)  IN PATIENT BELONGING ROOM: 4 pennies,2 dimes,3 quarters, blue pen black pen, black glasses, black glasses case, msc candy, black and brown purse, reciepts, msc notes, magno cigarettes(3), cheetah print gloves,black shoes, black jacket with fur, white wash cloth, pink shirt with flowers, pink tank top with jewels, grey and green pants, brown leather watch (worn out)    List items sent to safe:nothing  All other belongings put in assigned cubby in belongings room.     I have reviewed my belongings list on admission and verify that it is correct.     Patient signature_______________________________    Second staff witness (if patient unable to sign) ______________________________       I have received all my belongings at discharge.    Patient signature________________________________    Mitesh  2/8/2018  7:55 PM

## 2018-02-09 NOTE — PLAN OF CARE
Face to face end of shift report received from Nabila JUAN RN. Rounding completed. Patient observed in [facility].     Concepcion Donis  2/9/2018  9:04 AM

## 2018-02-09 NOTE — PROGRESS NOTES
"CLINICAL NUTRITION SERVICES  -  ASSESSMENT NOTE    Tamera GARNER Stacy: Admission Nutrition Risk Screen - reduced intake.  77 yof admitted with psychosis. Patient has history of chronic kidney disease (stage 4 per care everywhere nephrology note) and hypertension. Noted per care everywhere office visit on 12/6/17, patient has a history of difficulty swallowing and poor fitting dentures.     Height: 5' 1.5\"  Weight: 105 lbs- 1/23/18  BMI: 19.52  Weight Status:  Normal BMI  IBW: 107  Weight History: Noted 8 lb weight loss from 12/6/17 to 1/23/18.   113 lb - 12/6/17    Estimated Energy Needs: 9785-9595 kcals (30-35 Kcal/Kg)  Estimated Protein Needs: 30-40 grams protein (0.6-0.8 g pro/Kg)- CKD     MALNUTRITION:  % Weight Loss:  Need recent weight to assess any further weight loss  % Intake:   meets criteria with previous weight loss    Malnutrition Diagnosis: at risk    NUTRITION RECOMMENDATIONS  Low sodium diet - chronic kidney disease. Encourage soft foods for difficulty chewing and swallowing (monitor swallowing ability).  Will send Mighty Shakes TID.  Please obtain weight when able.    MONITORING AND EVALUATION:  Dietitian will monitor weight, labs, intake, swallowing ability.                  "

## 2018-02-10 NOTE — PLAN OF CARE
"Face to face end of shift report received from NATHALY Beckett. Rounding completed. Patient observed. Awake in room - used the bathroom - also used styrofoam cup to urinate in - did politely ask staff to empty as \"my hands are shaky and im scared I'll spill it\" she spoke much slower and more sense than this am - she also was quite taken with the female  who she would \"like to draw a picture of\" - is wearing her ortho shoes which do stretch for bunions - and is noticeably steadier on her feet. Continuous camera and physical checks continue.  Pt pleasant, polite and appropriate.    Nabila Ac  2/10/2018  2:42 AM          "

## 2018-02-10 NOTE — PLAN OF CARE
Problem: Patient Care Overview  Goal: Individualization & Mutuality  Patient will be compliant with treatment team recommendations and medications.  Patient will sleep 6-8 hours per night.   Patient able to wear velcro shoes for bunions.   Patient will have reality based conversation by discharge.   Patient will be non aggressive towards staff and peers during hospitalization.   Patient will deny SI by discharge.     Patient not weaning at this time. Will reassess daily in treatment team.    Outcome: Improving  Slept must better this noc - approx 6 hrs -  Did get up for the bathroom twice - has been appropriate, pleasant and polite to staff - did return to bed after visiting with staff

## 2018-02-10 NOTE — PLAN OF CARE
"Problem: Patient Care Overview  Goal: Individualization & Mutuality  Patient will be compliant with treatment team recommendations and medications.  Patient will sleep 6-8 hours per night.   Patient able to wear velcro shoes for bunions.   Patient will have reality based conversation by discharge.   Patient will be non aggressive towards staff and peers during hospitalization.   Patient will deny SI by discharge.     Patient not weaning at this time. Will reassess daily in treatment team.   Patient irritable, labile all shift. Rambling repetitive speech and yelling at times. Patient difficult to redirect and gets very upset when feeling rushed. Talks numerous times about \"This man screamed, you're crazy! You're crazy! You need to be taken away!\" Patient screaming at male  this evening to \"get the fuck out of here!\" and then sobbing to this writer about not wanting men around her. Reports not filling out meal menus stating \"they fill it out for me and get me food I can't eat\" and then demands to fill out another breakfast menu after this writer observed staff helping patient fill out first one. Given a pair of cheater glasses per request this shift.   1816- Requested/Received PRN Tylenol 650 mg for generalized pain.   Patient compliant with scheduled medications put into vanilla pudding. This writer sat for almost 40 minutes while patient took medications this way. Laying down to rest by 2100 for remainder of shift.       Problem: Thought Process Alteration (Adult)  Goal: Identify Related Risk Factors and Signs and Symptoms  Related risk factors and signs and symptoms are identified upon initiation of Human Response Clinical Practice Guideline (CPG).   Continue to monitor at this time.   Goal: Improved Thought Process  Patient will have reality based conversation by discharge.   Continue to monitor at this time.       "

## 2018-02-10 NOTE — PLAN OF CARE
Face to face end of shift report received from Nabila JUAN RN. Rounding completed. Patient observed in MHICU room.     Concepcion Donis  2/10/2018  8:08 AM
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
## 2018-02-10 NOTE — PLAN OF CARE
"Problem: Patient Care Overview  Goal: Individualization & Mutuality  Patient will be compliant with treatment team recommendations and medications.  Patient will sleep 6-8 hours per night.   Patient able to wear velcro shoes for bunions.   Patient will have reality based conversation by discharge.   Patient will be non aggressive towards staff and peers during hospitalization.   Patient will deny SI by discharge.     Patient not weaning at this time. Will reassess daily in treatment team.    Outcome: No Change  Patient labile, angry one moment, laughing the next. Speech is rambling, difficult to track. Does get lost in conversation and backtracks, and tells stories repetitively, especially stories from her past. Is untidy and disheveled, wearing a scrub jacket around her waist as a skirt. Admits to pain in her hands and back, states she needs to see a chiropractor. Has been making many requests for beverages, the phone, denture items. Refused her morning dose of Senna, and her morning and afternoon doses of Neurontin, states the Neurontin causes her more side effects than helps to relieve her symptoms. States she \"does not believe in God, he is a jerk\", that she has had many revelations while here, would like to attend mass, and call her . Did use the phone to call [place] to give them a list of items she needs brought here, states \"I can give them money and they can taxi my stuff here\".   0845-accepted Tylenol 650 mg PO.  1328-accepted Tylenol 650 mg PO, was told the provider would like another urine sample, states \"that provider who wants my piss should give me something stronger for my pain\".  1400-patient updated that she could wean to the open unit, states she wants to shower \"before going out in the public\". Was going to shower, became aggravated, irritated, and short with staff, swearing at staff while using the bathroom to collect a urine sample. Did not shower. Will not be able to wean at this time. " Attempted to update provider, unable to contact provider at this time.   1420-urine sample sent to lab.

## 2018-02-11 NOTE — PLAN OF CARE
"Face to face end of shift report received from NATHALY Beckett. Rounding completed. Patient observed. Is awake in the lounge - amanda continues - is requesting information on \"all the medications I take so I can figure out whats wrong with me\" made a inappropriate sexual statement to male staff about another male staff - redirected with minimum difficulty.  Retired to bed.      Nabila Ac  2/11/2018  1:05 AM          "
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
## 2018-02-11 NOTE — PLAN OF CARE
Face to face end of shift report received from Concepcion LEO RN. Rounding completed. Patient observed sitting in bedroom awake. No requests at this time.      Sophy Moe  2/10/2018  6:57 PM

## 2018-02-11 NOTE — PLAN OF CARE
"Problem: Patient Care Overview  Goal: Individualization & Mutuality  Patient will be compliant with treatment team recommendations and medications.  Patient will sleep 6-8 hours per night.   Patient able to wear velcro shoes for bunions.   Patient will have reality based conversation by discharge.   Patient will be non aggressive towards staff and peers during hospitalization.   Patient will deny SI by discharge.       Patient continues to be labile this shift. Tense rambling repetitive speech. Patient will be sobbing one minute and then yelling the next.  1636- Agreed to take PRN Atarax 50 mg.    Talked about coming out to open unit in beginning of shift and patient became polite and states \"oh yes please, that will make me feel so much better\". Moved out to open unit appropriately before dinner. Once on open unit, patient in day room with peers. Noted to make a few inappropriate comments but redirected easier than previous shift and observed mostly joking with peers. Took a significant amount of time to eat dinner but did end up eating 100%. Becoming irritable again later this afternoon, patient firmly stated \"I want my damn meds and I just want some fucking Ativan\".   1905- Received PRN Haldol with scheduled HS medications early. Continues to take medications in vanilla pudding.   Sitting in day room with peer after this. Patient appeared to calm down just a bit but continues to be dismissive at times.     Problem: Thought Process Alteration (Adult)  Goal: Identify Related Risk Factors and Signs and Symptoms  Related risk factors and signs and symptoms are identified upon initiation of Human Response Clinical Practice Guideline (CPG).   Continue to monitor at this time.   Goal: Improved Thought Process  Patient will have reality based conversation by discharge.   Continue to monitor at this time.       "

## 2018-02-11 NOTE — PLAN OF CARE
Problem: Patient Care Overview  Goal: Individualization & Mutuality  Patient will be compliant with treatment team recommendations and medications.  Patient will sleep 6-8 hours per night.   Patient able to wear velcro shoes for bunions.   Patient will have reality based conversation by discharge.   Patient will be non aggressive towards staff and peers during hospitalization.   Patient will deny SI by discharge.     Outcome: Improving  Slept all noc - without issue

## 2018-02-11 NOTE — PLAN OF CARE
Face to face end of shift report received from Nabila JUAN RN. Rounding completed. Patient observed in [facility].     Concepcion Donis  2/11/2018  7:37 AM

## 2018-02-11 NOTE — PLAN OF CARE
Face to face end of shift report received from NATHALY Javier. Rounding completed. Patient observed in bed.     Yadira Self  2/11/2018  4:41 PM
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
## 2018-02-12 NOTE — DISCHARGE INSTRUCTIONS
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    Major Treatments, Procedures and Findings: Stabilize with medications, connect with community programs.    Symptoms to Report: feeling more aggressive, increased confusion, losing more sleep, mood getting worse or thoughts of suicide    Lifestyle Adjustment: Take all medications as prescribed, meet with doctor/ medication provider, out patient therapist,  as scheduled. Abstain from alcohol or any unprescribed drugs.    Psychiatry Follow-up:     [facility]  Med Management- Naomi Cook- 4/13 @8:45  [address] [place] [address]  Phone:  [phone]    Fax: [phone]    Guardian through [facility] - Alanna Landry  [phone] - emergency contact number is [phone]    [facility] -  - Jennifer Giraldo  [address] [place] [address]  Phone:  [phone]   Fax - [phone]    Mariana Lang   [address] [place] [address]  Phone: [phone]  Fax: [phone]    [facility] - PCP Dr. Isaura Lemons - as arranged  [address], [place] [address]  Phone: [phone]   Fax: [phone]    Resources:   Crisis Intervention: 718.396.7647 or 229-072-2103 (TTY: 815.235.8705).  Call anytime for help.  National [place] on Mental Illness (www.[place].taylor.org): 845.485.7874 or 633-460-7661.  Alcoholics Anonymous (www.alcoholics-anonymous.org): Check your phone book for your local chapter.  Suicide Awareness Voices of Education (SAVE) (www.save.org): 397-674-JTGF (8251)  National Suicide  Prevention Line (www.mentalhealthmn.org): 286-421-CMTD (6448)  Mental Health Consumer/Survivor Network of [place] (www.mhcsn.net): 111.807.3399 or 952-256-1952  Mental Health Association of [place] (www.mentalhealth.org): 712.938.7457 or 345-177-1791    General Medication Instructions:   See your medication sheet(s) for instructions.   Take all medicines as directed.  Make no changes unless your doctor suggests them.   Go to all your doctor visits.  Be sure to have all your required lab tests. This way, your medicines can be refilled on time.  Do not use any drugs not prescribed by your doctor.  Avoid alcohol.    [place] Area:  [facility], Crisis stabilization [facility]- [phone]  [facility] Crisis Line: 2-630-294-3453  Advocates For Family Peace: 652-9959  Sexual Assault Program [facility]: 549.724.4784 or 1-489.270.8191  [facility]: 1-421-297-9876 Ext: 279       Calls answered Mon-Fri-8:00 am--4:30 pm    [place]:  Adv[facility]ates for Family Peace: 1-577-097-0538  [facility] first call for help: 4-765-282-0200  [facility] Crisis [facility]:  (583) 965-5862      [place] Area:  Warm Line: 1-340.453.6295       Calls answered Tuesday--Saturday 4:00 pm--10:00 pm  [facility] Crisis Line - [phone]  [facility] Crisis Stabilization 140-376-9214    [place] Statewide:  [place] Crisis and Referral Services: 8-477-363-9978  National Suicide Prevention Lifeline: 5-069-441-TALK (9091)   - kta9dbjn- Text  Life  to [address]  First Call for Help: 2-1-1  [facility] Helpline- 1-621-UDTE-HELP

## 2018-02-12 NOTE — PLAN OF CARE
Patient notified.  She was sent to scheduling for repeat sodium level.   Problem: Patient Care Overview  Goal: Individualization & Mutuality  Patient will be compliant with treatment team recommendations and medications.  Patient will sleep 6-8 hours per night.   Patient able to wear velcro shoes for bunions.   Patient will have reality based conversation by discharge.   Patient will be non aggressive towards staff and peers during hospitalization.   2/11/18-patient unable to wean at this time due to behaviors.  Patient will deny SI by discharge.      Outcome: No Change  Slept well this noc - has been up once or twice to use the bathroom - did use styrafoam cup to urinate in - and politely asked staff to empty - appropriate and polite.

## 2018-02-12 NOTE — PLAN OF CARE
1117- Called [facility] to given nurse to nurse report. Nurse will be calling writer back.  1440- Gave Nurse to to Bev ANDERSEN at [facility].

## 2018-02-12 NOTE — PLAN OF CARE
"Problem: Patient Care Overview  Goal: Individualization & Mutuality  Patient will be compliant with treatment team recommendations and medications.  Patient will sleep 6-8 hours per night.   Patient able to wear velcro shoes for bunions.   Patient will have reality based conversation by discharge.   Patient will be non aggressive towards staff and peers during hospitalization.   2/11/18-patient unable to wean at this time due to behaviors.  Patient will deny SI by discharge.      Pt has been pleasant and cooperative this shift. Speech is rambled with flight of ideas. Has a full range of affect. Says she had a rough day but \"I am doing much better back here without that roommate.\" Pt has been talking and laughing with staff, telling stories from her past. She is complaint with prescribed medications. Denies anxiety and depression. Complains of constant pain in hands and is heard moaning grabbing her hand during conversation. PRN tylenol 650 mg po was given as requested at 1839 with reports of no change in pain. Pt was given lotion as requested, she rubbed it on legs, arms and feet. Pt has been polite, using please and thank you. She is compliant with prescribed medication. Will continue to monitor.     Problem: Thought Process Alteration (Adult)  Goal: Improved Thought Process  Patient will have reality based conversation by discharge.   Pt is able to have a reality based conversation.       "

## 2018-02-12 NOTE — PLAN OF CARE
Problem: Patient Care Overview  Goal: Team Discussion  Team Plan:   BEHAVIORAL TEAM DISCUSSION    Participants:Atilio Chaparro NP,  Michelle Lowe NP, Genia Delgadillo NP,  Adry Saldivar [facility], Shahla House Discharge Planner, Caio Booth RN. Chrystal Kimbrough Recreation Therapy, Noris Grant OT, Christine Alcantar OT  Progress: Moderate  Continued Stay Criteria/Rationale: Will discharge back to [place] today  Medical/Physical: Dementia  Precautions: n/a       Behavioral Orders   Procedures     Code 1 - Restrict to Unit     Routine Programming       As clinically indicated     Self Injury Precaution     Status 15       Every 15 minutes.      Plan: Discharge back to assisted living  Rationale for change in precautions or plan: n/a

## 2018-02-12 NOTE — PLAN OF CARE
Problem: Patient Care Overview  Goal: Team Discussion  Team Plan:   BEHAVIORAL TEAM DISCUSSION    Participants:Atilio Chaparro NP,  Michelle Loew NP, Genia Delgadillo NP,  Adry Saldivar [facility], Shahla House Discharge Planner, Caio Booth RN. Chrystal Kimbrough Recreation Therapy, Noris Grant OT, Christine Alcantar OT  Progress: Moderate  Continued Stay Criteria/Rationale: Will discharge back to [place] today  Medical/Physical: Dementia  Precautions: n/a  Behavioral Orders   Procedures     Code 1 - Restrict to Unit     Routine Programming     As clinically indicated     Self Injury Precaution     Status 15     Every 15 minutes.     Plan: Discharge back to assisted living  Rationale for change in precautions or plan: n/a

## 2018-02-12 NOTE — PLAN OF CARE
Face to face end of shift report received from Leif HERNÁNDEZ RN. Rounding completed. Patient observed in [facility].    Ashley Stafford  2/12/2018  3:51 PM

## 2018-02-12 NOTE — TELEPHONE ENCOUNTER
Nurse had called to schedule 1 month follow upand that she missed her last appt.  I gave her the first available which was in April.  She question her being out of medication in the month.  Should this patient be scheduled sooner?

## 2018-02-12 NOTE — PLAN OF CARE
Problem: Patient Care Overview  Goal: Team Discussion  Team Plan:   BEHAVIORAL TEAM DISCUSSION    Participants:Atilio Chaparro NP,  Michelle Lowe NP, Genia Delgadillo NP,  Adry Saldiv[facility], Shahla House Discharge Planner, Caio Booth RN. Chrystal Kimbrough Recreation Therapy, Noris Grant OT, Christine Alcantar OT  Progress: Moderate  Continued Stay Criteria/Rationale: Will discharge back to [place] today  Medical/Physical: Dementia  Precautions: n/a       Behavioral Orders   Procedures     Code 1 - Restrict to Unit     Routine Programming       As clinically indicated     Self Injury Precaution     Status 15       Every 15 minutes.      Plan: Discharge back to assisted living  Rationale for change in precautions or plan: n/a

## 2018-02-12 NOTE — PLAN OF CARE
Problem: Patient Care Overview  Goal: Discharge Needs Assessment  Outcome: Adequate for Discharge Date Met: 02/12/18  Patient to discharge back to [place] today - she is soft spoken at times and filled with many concerns about how things are not being done right.  She states she may need to look at taking legal action if things are not improved - she has complaints about being bothered during her lunch and upset about this.  She continues to state the same stories over and over again - is upset if you interrupt in any way as she needs to go through the whole story.  She does state that they were looking at moving her to the memory care part of [place] and she is okay with this as she states she has really struggled with her memory and feels she needs this.  She will discharge back today and her [facility] worker will transport - she is upset about this also as she has things she wants her [facility] worker to be doing with her.  She has not endorsed suicidal ideation for several days and is showing her typical irritably with being in the hospital.  Called and left a message for the guardian to update.  Nursing staff was calling [place] to do a nurse to nurse.  Discharge instructions, including; demographic sheet, psychiatric evaluation, discharge summary, and AVS were faxed to these next level of care providers per discharge planner.  We did reschedule her with Naomi Luna as her appointment was this past Friday.

## 2018-02-12 NOTE — PLAN OF CARE
"Problem: Patient Care Overview  Goal: Individualization & Mutuality  Patient will be compliant with treatment team recommendations and medications.  Patient will sleep 6-8 hours per night.   Patient able to wear velcro shoes for bunions.   Patient will have reality based conversation by discharge.   Patient will be non aggressive towards staff and peers during hospitalization.   2/11/18-patient unable to wean at this time due to behaviors.  Patient will deny SI by discharge.      Outcome: No Change  Pt has been labile with staff today. Speech is rambling. She has made request for things and when pt is unable to get the items immediately she became irritable with staff. When told that staff is doing the best that they can to accomodate pt she would say thank you and no longer be upset. She did receive tylenol for 8 out of 10 hand pain. She took her medications individually with pudding. She did refuse her gabapentin this AM stating that is is causing \"all sorts\" of side effects. When pt was told the plan for discharge today she became upset with staff stating \"don't lie to me.\" Pt stated that she was upset regarding discharge time due to pt wanting to go places that would be closed at this time. Informed pt that her ride is unavailable before this time. Will continue to monitor.    1430- attempted to have pt sign AVS and she reported that she will not sign it until after she eats lunch. Pt was informed that she already had lunch and ate 100% of it. Offered pt snacks that are available on the unit and she refused stating \"I gave my food back to them people because it was hard and cold.\" Talked with floor staff and staff reported that pt did eat all of her lunch. Offered pt again food that was available on unit and she stated \"no.\" Pt continued to refuse signing AVS.    Problem: Thought Process Alteration (Adult)  Goal: Improved Thought Process  Patient will have reality based conversation by discharge.   Outcome: " Improving  Pt is able to carry on short reality based conversations.

## 2018-02-12 NOTE — DISCHARGE SUMMARY
"Psychiatric Discharge Summary    Tamera Kumar MRN# [id number]   Age: 77 year old YOB: 1940     Date of Admission:  2/8/2018  Date of Discharge:  2/13/2018  Admitting Physician:  Stefano Moncada MD  Discharge Physician:  IAN Edwards CNP         Event Leading to Hospitalization and Hospital Stay   Tamera Kumar is a 77 year old  female who presented to [facility] ED via police after eloping from [facility], where she was on a memory unit. She was admitted to [facility] for stabilization.  [facility] staff report she has been violent there, including physical aggression of hitting and kicking staff. She was initially calm and cooperative with staff during intake but became angry and irritable when staff requested she change clothes and take of gloves she was given in ED. Patient has cuts on the tips of her fingers that she thinks are bleeding but not noted by Nursing. She does have healing cuts on tips of fingers. Additional symptoms include the following: Pressured speech, rapid speech, flights of ideas, irritability, difficulty taking redirection, argumentative, boisterous and confusion. She confirms that she has not slept in two days. States she is here because \"Jared said I needed to go to the [facility]\". She notes staff \"didn't do anything\" and \"he's still there and I'm here\". She corrects nursing to speak her Guyanese name and not Tamera. Other staff report patient making multiple odd statements about only being able to speak Guyanese and not understanding English, but is spoken by patient in English. Continued to verbalize homicidal thoughts toward [facility] staff. Patient observed to run at [facility] doors stating \"I can fucking escape from here anytime I want.\" Additional behaviors included screaming at times then crying right after. During interview today she is focused on past and has difficulty with recent memory. When asked if she knew where " "she was, she stated \"don't try any of that psychiatry stuff on me\".  Does not note decline in symptoms in past several weeks. When asked specific questions she will decline to answer by changing subject.  Has multiple requests of staff for personal belongings. Patient refused to take sinemet as reports side effects of restless hands an legs. Reports pain in hands that is resolved with acetaminophen. Patient currently on 72 Hour Hold. Willing to try pants as currently wearing blanket and presented concerns about falling. She did not request to use bathroom and is observed in MHICU to be in squatting position urinating into cup.  When asked about this, states \"I need more Depends\". Repeat UA was done and reviewed with Hospitalist and no noted infection. Patient focused on stories of the past and monopolized staff time significantly. Morning medication administration taking over two hours. Patient was moved into room with roommate, who noted patient threw a towel at her and attempted to flush her panties. Patient loudly and adamantly denied this stating \"I don't even wear underwear\".  She was quick to respond to statements she didn't like with inappropriate and disparaging comments towards others. She had not slept in several days. Her Sinemet and Wellbutrin was discontinued. Patient slept well during her hospitalization. She would tell grandiose stories about being a Holocaust survivor and sloane 1 2 3 4 on left arm with marker to indicate this. Describes how great [place] is but reports moving from [place] to [place] when she was [age] years old. Multiple requests of staff to do things her way, unsure if this is related to memory issues or more personality based. She was discharged back to [facility] on 2/12/14 but discharge was cancelled due to [facility] worker refusing to transport patient. Discharge was completed on 2/13/16 with , Jennifer Centeno transporting. Although patient denied suicide and homicidal behaviors " throughout her hospitalization, patient overheard threatening to fire Jennifer and to harm her. She was overheard by staff to rescinded threat to harm Jennifer. Due to the advancement of Tamera's dementia and medical issues, she is more appropriate for admission to [facility] Behavioral [facility] Unit to manage her future needs.            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         Labs:     Results for orders placed or performed during the hospital encounter of 02/08/18   Acetaminophen level   Result Value Ref Range    Acetaminophen Level <2 mg/L   Alcohol ethyl   Result Value Ref Range    Ethanol g/dL <0.01 0.01 g/dL   CBC with platelets differential   Result Value Ref Range    WBC 8.7 4.0 - 11.0 10e9/L    RBC Count 3.57 (L) 3.8 - 5.2 10e12/L    Hemoglobin 10.8 (L) 11.7 - 15.7 g/dL    Hematocrit 33.2 (L) 35.0 - 47.0 %    MCV 93 78 - 100 fl    MCH 30.3 26.5 - 33.0 pg    MCHC 32.5 31.5 - 36.5 g/dL    RDW 14.1 10.0 - 15.0 %    Platelet Count 307 150 - 450 10e9/L    Diff Method Automated Method     % Neutrophils 83.4 %    % Lymphocytes 10.6 %    % Monocytes 5.3 %    % Eosinophils 0.0 %    % Basophils 0.5 %    % Immature Granulocytes 0.2 %    Nucleated RBCs 0 0 /100    Absolute Neutrophil 7.3 1.6 - 8.3 10e9/L    Absolute Lymphocytes 0.9 0.8 - 5.3 10e9/L    Absolute Monocytes 0.5 0.0 - 1.3 10e9/L    Absolute Eosinophils 0.0 0.0 - 0.7 10e9/L    Absolute Basophils 0.0 0.0 - 0.2 10e9/L    Abs Immature Granulocytes 0.0 0 - 0.4 10e9/L    Absolute Nucleated RBC 0.0    Comprehensive metabolic panel   Result Value Ref Range    Sodium 143 133 - 144 mmol/L    Potassium 4.5 3.4 - 5.3 mmol/L    Chloride 107 94 -  109 mmol/L    Carbon Dioxide 22 20 - 32 mmol/L    Anion Gap 14 3 - 14 mmol/L    Glucose 106 (H) 70 - 99 mg/dL    Urea Nitrogen 39 (H) 7 - 30 mg/dL    Creatinine 2.66 (H) 0.52 - 1.04 mg/dL    GFR Estimate 17 (L) >60 mL/min/1.7m2    GFR Estimate If Black 21 (L) >60 mL/min/1.7m2    Calcium 9.2 8.5 - 10.1 mg/dL    Bilirubin Total 0.5 0.2 - 1.3 mg/dL    Albumin 4.0 3.4 - 5.0 g/dL    Protein Total 7.8 6.8 - 8.8 g/dL    Alkaline Phosphatase 141 40 - 150 U/L    ALT 57 (H) 0 - 50 U/L     (H) 0 - 45 U/L   UA reflex to Microscopic and Culture   Result Value Ref Range    Color Urine Light Yellow     Appearance Urine Clear     Glucose Urine Negative NEG^Negative mg/dL    Bilirubin Urine Negative NEG^Negative    Ketones Urine 10 (A) NEG^Negative mg/dL    Specific Gravity Urine 1.007 1.003 - 1.035    Blood Urine Moderate (A) NEG^Negative    pH Urine 6.0 4.7 - 8.0 pH    Protein Albumin Urine 100 (A) NEG^Negative mg/dL    Urobilinogen mg/dL Normal 0.0 - 2.0 mg/dL    Nitrite Urine Negative NEG^Negative    Leukocyte Esterase Urine Large (A) NEG^Negative    Source Midstream Urine     RBC Urine 1 0 - 2 /HPF    WBC Urine 15 (H) 0 - 2 /HPF    Bacteria Urine Few (A) NEG^Negative /HPF    Squamous Epithelial /HPF Urine 2 (H) 0 - 1 /HPF    Mucous Urine Present (A) NEG^Negative /LPF   TSH   Result Value Ref Range    TSH 0.92 0.40 - 4.00 mU/L   Salicylate level   Result Value Ref Range    Salicylate Level <2 mg/dL   Drug Screen Urine (Range)   Result Value Ref Range    Amphetamine Qual Urine Negative NEG^Negative    Barbiturates Qual Urine Negative NEG^Negative    Benzodiazepine Qual Urine Negative NEG^Negative    Cannabinoids Qual Urine Negative NEG^Negative    Cocaine Qual Urine Negative NEG^Negative    Opiates Qualitative Urine Negative NEG^Negative    Methadone Qual Urine Negative NEG^Negative    PCP Qual Urine Negative NEG^Negative   Creatinine   Result Value Ref Range    Creatinine 2.30 (H) 0.52 - 1.04 mg/dL    GFR Estimate 21 (L)  >60 mL/min/1.7m2    GFR Estimate If Black 25 (L) >60 mL/min/1.7m2   Urea nitrogen   Result Value Ref Range    Urea Nitrogen 36 (H) 7 - 30 mg/dL   UA reflex to Microscopic   Result Value Ref Range    Color Urine Yellow     Appearance Urine Clear     Glucose Urine Negative NEG^Negative mg/dL    Bilirubin Urine Negative NEG^Negative    Ketones Urine Negative NEG^Negative mg/dL    Specific Gravity Urine 1.012 1.003 - 1.035    Blood Urine Negative NEG^Negative    pH Urine 5.5 4.7 - 8.0 pH    Protein Albumin Urine 30 (A) NEG^Negative mg/dL    Urobilinogen mg/dL Normal 0.0 - 2.0 mg/dL    Nitrite Urine Negative NEG^Negative    Leukocyte Esterase Urine Large (A) NEG^Negative    Source Midstream Urine     WBC Urine 8 0 - 2 /HPF    RBC Urine 0 0 - 2 /HPF    Squamous Epithelial /HPF Urine 8 0 - 1 /HPF   Urine Culture Aerobic Bacterial   Result Value Ref Range    Specimen Description Midstream Urine     Culture Micro (A)      10,000 to 50,000 colonies/mL  Mixed bacterial ron  No further identification or sensitivity done            Discharge Medications:     Current Discharge Medication List      CONTINUE these medications which have NOT CHANGED    Details   bisacodyl (DULCOLAX) 10 MG Suppository Place 10 mg rectally daily as needed for constipation      !! TRAZODONE HCL PO Take 100 mg by mouth nightly as needed for sleep      donepezil (ARICEPT) 10 MG tablet Take 1 tablet (10 mg) by mouth At Bedtime  Qty: 30 tablet, Refills: 8    Associated Diagnoses: Vascular dementia with behavior disturbance      gabapentin (NEURONTIN) 100 MG capsule Take 1 capsule (100 mg) by mouth 3 times daily  Qty: 90 capsule, Refills: 0    Associated Diagnoses: Anxiety      senna-docusate (SENOKOT-S;PERICOLACE) 8.6-50 MG per tablet Take 1 tablet by mouth daily      ACETAMINOPHEN PO Take 650 mg by mouth every 6 hours as needed for pain       DULoxetine (CYMBALTA) 30 MG EC capsule Take 1 capsule daily  Qty: 90 capsule, Refills: 3    Associated  Diagnoses: Recurrent major depressive disorder, in partial remission (H)      CLONIDINE HCL PO Take 0.1 mg by mouth 2 times daily Patient takes at 8 A.M. And 9 P.M.      levothyroxine (SYNTHROID/LEVOTHROID) 75 MCG tablet Take 1 tablet (75 mcg) by mouth daily  Qty: 30 tablet, Refills: 0    Associated Diagnoses: 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 substances      traZODone (DESYREL) 100 MG tablet Take 1 tablet (100 mg) by mouth At Bedtime  Qty: 30 tablet, Refills: 3    Associated Diagnoses: Recurrent major depressive disorder, in partial remission (H)      Omeprazole (PRILOSEC PO) Take 20 mg by mouth every morning      amLODIPine (NORVASC) 5 MG tablet Take 1 tablet (5 mg) by mouth daily  Qty: 30 tablet, Refills: 3    Associated Diagnoses: Essential hypertension      memantine (NAMENDA) 10 MG tablet Take 1 tablet (10 mg) by mouth daily  Qty: 30 tablet, Refills: 0    Associated Diagnoses: Vascular dementia with behavior disturbance      multivitamin, therapeutic with minerals (THERA-VIT-M) TABS Take 1 tablet by mouth daily  Qty: 30 each, Refills: 0    Associated Diagnoses: Bipolar 1 disorder (H)      cholecalciferol 5000 UNITS CAPS Take 1 capsule (5,000 Units) by mouth daily  Qty: 30 capsule, Refills: 0    Associated Diagnoses: Bipolar 1 disorder (H)       !! - Potential duplicate medications found. Please discuss with provider.      STOP taking these medications       carbidopa-levodopa (SINEMET)  MG per tablet Comments:   Reason for Stopping:         buPROPion (WELLBUTRIN XL) 300 MG 24 hr tablet Comments:   Reason for Stopping:             Justification for dual anti-psychotic use: Not applicable  Patient is not on two different antipsychotics at time of discharge.         Psychiatric Examination:   Appearance:  awake, alert and appeared as age stated  Attitude:  cooperative, evasive and guarded  Eye Contact:  good  Mood:  better  Affect:  mood congruent  Speech:  clear, coherent and alternating  mumbling at times.  Psychomotor Behavior:  no evidence of tardive dyskinesia, dystonia, or tics  Thought Process:  linear, circumstantial and affected by dementia  Associations:  loosening of associations present  Thought Content:  no evidence of suicidal ideation or homicidal ideation, no evidence of psychotic thought, no auditory hallucinations present and no visual hallucinations present  Insight:  fair  Judgment:  fair  Oriented to:  Refuses to answer.  Attention Span and Concentration:  fair  Recent and Remote Memory:  poor  Fund of Knowledge: low-normal  Muscle Strength and Tone: flaccid  Gait and Station: Normal          Discharge Plan:   [facility]  Med Management- Naomi Cook- 4/13 @8:45  [address] [address]  Phone:  [phone]    Fax: [phone]    Guardian through [facility] - Alanna Frantz  [phone] - emergency contact number is [phone]    [facility] -  - Jennifer Giraldo  [address] [place] [address]  Phone:  [phone]   Fax - [phone]    Mariana Lang   [address] [place] [address]  Phone: [phone]  Fax: [phone]    [facility] - PCP Dr. Isaura Lemons - as arranged  [address] [place] [address]  Phone: [phone]   Fax: [phone]    If re-admission is needed, due to significant cognitive decline along with medical issues, she would be most appropriately placed in a Geriatric Psychiatry Inpatient Unit that can best meet her health needs.       Discharge Services Provided:   > 30 minutes spent on discharge services, including:  Final examination of patient.  Review and discussion of Hospital stay.  Instructions for continued outpatient care/goals.  Preparation of discharge records.  Preparation of medications refills and new prescriptions.    Attestation:  The patient has been seen and evaluated by me,  IAN Edwards CNP

## 2018-02-12 NOTE — PLAN OF CARE
Face to face end of shift report received from NATHALY May. Rounding completed. Patient observed. Lying on left side - eyes closed - non-labored breathing noted.     Nabila Ac  2/12/2018  3:20 AM

## 2018-02-12 NOTE — PLAN OF CARE
Face to face end of shift report received from Nabila ENGLE RN. Rounding completed. Patient observed in pt's room.     Leif Asif  2/12/2018  7:41 AM

## 2018-02-13 NOTE — PLAN OF CARE
Problem: Patient Care Overview  Goal: Individualization & Mutuality  Pt is irritable this evening due to not discharging as planned, stated I don't want to spend another night in lock-up! Made call to [facility] worker Maxine at approximately 7pm, spoke calmly and expressed her disappointment with not being discharged. After 3 minutes, pt handed phone to writer and asked to have the call ended. Remained calm but frustrated; made frequent requests to staff and noted increased lability throughout the evening, at times cursing at staff. Pt continues to need frequent redirection.  2050 - Pt continues to be agitated and makes frequent demands of staff.    Problem: Patient Care Overview  Goal: Individualization & Mutuality  Patient will be compliant with treatment team recommendations and medications.  Patient will sleep 6-8 hours per night.   Patient able to wear velcro shoes for bunions.   Patient will have reality based conversation by discharge.   Patient will be non aggressive towards staff and peers during hospitalization.   2/11/18-patient unable to wean at this time due to behaviors.  Patient will deny SI by discharge.      Behavior remains labile, pt agitated due to not discharging today as planned.     Problem: Thought Process Alteration (Adult)  Goal: Improved Thought Process  Patient will have reality based conversation by discharge.   Does have reality based conversation today.

## 2018-02-13 NOTE — PLAN OF CARE
"Problem: Patient Care Overview  Goal: Individualization & Mutuality  Patient will be compliant with treatment team recommendations and medications.  Patient will sleep 6-8 hours per night.   Patient able to wear velcro shoes for bunions.   Patient will have reality based conversation by discharge.   Patient will be non aggressive towards staff and peers during hospitalization.   2/11/18-patient unable to wean at this time due to behaviors.  Patient will deny SI by discharge.      Outcome: No Change  Pt up on the MHICU unit requesting to use the restroom. Pt stated that she was having \"30\"/10 pain in her fingers and requested a PRN for her pain - then rated pain as a 3/10. Pt was given 650 mg Tylenol PO at 0402 for this. Pt returned to bed shortly after PRN was given.      Pt has been in bed with eyes closed and regular respirations. 15 minute and PRN checks all night. Will continue to monitor.    0645 manual BP on right arm is running a little high at 140/110 - pt is asymptomatic at this time.    Isaura Nru-  2/13/2018  5:32 AM    "

## 2018-02-13 NOTE — PLAN OF CARE
"Problem: Patient Care Overview  Goal: Individualization & Mutuality  Patient will be compliant with treatment team recommendations and medications.  Patient will sleep 6-8 hours per night.   Patient able to wear velcro shoes for bunions.   Patient will have reality based conversation by discharge.   Patient will be non aggressive towards staff and peers during hospitalization.   2/12/18-patient unable to wean at this time due to behaviors.  Patient will deny SI by discharge.       Outcome: Adequate for Discharge Date Met: 02/13/18  Discharge Note    Patient Discharged to assisted living on 2/13/2018 10:50 AM via Private Car accompanied by  Jennifer DENNISON.     Patient informed of discharge instructions in AVS.  Pt refused to sign AVS for this writer. All personal belongings returned to patient. Psych evaluation, history and physical, AVS, and discharge summary faxed to [facility]- per discharge planner. Upon discharge pt denied having any SI. She did state that she is feeling \"homicidal\" toward her . When asked if she was planning on hurting Jennifer she stated \"no I won't hurt her, I just fired her days ago.\" She stated that she will \"be nice and smile.\" Jennifer pt's  was pt ride back to [place]. Writer informed  Jennifer, unit , and Genia GÓMEZ regarding this.     Leif Asif  2/13/2018  10:50 AM      "

## 2018-02-13 NOTE — PLAN OF CARE
"Problem: Patient Care Overview  Goal: Individualization & Mutuality  Patient will be compliant with treatment team recommendations and medications.  Patient will sleep 6-8 hours per night.   Patient able to wear velcro shoes for bunions.   Patient will have reality based conversation by discharge.   Patient will be non aggressive towards staff and peers during hospitalization.   2/12/18-patient unable to wean at this time due to behaviors.  Patient will deny SI by discharge.       Outcome: No Change  Pt continue to be irritable and shirt with staff. Mood is labile and rambling, tangential speech. Pt has refused to take scheduled Cymbalta, gabapentin, and Namenda. She stated that she wants medications that do not have any side effects. She stated that she wants to discharge but became up set with staff when she was told she would be leaving today. She denied having any SI to this writer. She stated that she does not belong here because she needs to be somewhere with \"memory care.\" She told this writer that she moved here when she was 6 years old.  Pt reported having pain rated at a 8 out of 10 in her hands and received PRN tylenol. Minutes after taking PRN tylenol she screamed and stated that the tylenol is not working. Informed pt that she needs to give medication time to start working. Will continue to monitor.     Problem: Thought Process Alteration (Adult)  Goal: Improved Thought Process  Patient will have reality based conversation by discharge.   Outcome: No Change  Pt is able to carry on short reality based conversations.      "

## 2018-02-13 NOTE — PLAN OF CARE
Spoke with patients [facility] worker, Maxine on the phone. She states patient called her twice today stating she is not discharging today and was rambling about multiple different topics.  Maxine then called her supervisor and reported phone call.  Supervisor told her she was not to  patient as they do not feel she was at her baseline.

## 2018-02-13 NOTE — PLAN OF CARE
Face to face end of shift report received from Isaura REESE RN. Rounding completed. Patient observed in pt's room.     Leif Asif  2/13/2018  7:57 AM

## 2018-02-13 NOTE — PLAN OF CARE
Face to face end of shift report received from Cesia SOARES RN. Rounding completed. Patient observed sleeping in bed.    Isaura Gallo  2/12/2018  11:50 PM

## 2018-03-09 NOTE — TELEPHONE ENCOUNTER
Spoke with nurse patient is at [place] at this time they will call back when she returns if needed.

## 2018-03-09 NOTE — TELEPHONE ENCOUNTER
Naomi are you able to see her today or would you fill her Rx's for a month? LM for [place] nurse to call back with which medications she will be out of.

## 2018-04-13 NOTE — PATIENT INSTRUCTIONS
Will decrease Zyprexa 10 mg TID to Zyprexa 7.5 mg TID     Zpac take as directed.    Albuterol inhaler q hours while awake for the next 10 days.    F/U in 1 month.

## 2018-04-13 NOTE — NURSING NOTE
"Chief Complaint   Patient presents with     Mental Health Problem       Initial /60 (BP Location: Left arm, Patient Position: Chair, Cuff Size: Adult Regular)  Pulse 73  Temp 97.9  F (36.6  C) (Tympanic)  Wt 111 lb (50.3 kg)  SpO2 96%  BMI 20.63 kg/m2 Estimated body mass index is 20.63 kg/(m^2) as calculated from the following:    Height as of 1/23/18: 5' 1.5\" (1.562 m).    Weight as of this encounter: 111 lb (50.3 kg).  Medication Reconciliation: complete   KATI ARRIAZA      "

## 2018-04-13 NOTE — PROGRESS NOTES
[facility]  Psychiatric Progress Note    Subjective         This is a 77 year old female here for f/u hospitalization. She was in [facility] with the majority of her medications discontinued. She is currently taking zyprexa 10 mg TID and going fairly well other than c/o sedation which maybe affected a URI which she reports having for the past 2 weeks as well. While in [facility] was manic, intrusive, and quite problematic on their unit which resulted in TID dose of the Zyprexa. The recommendation is to slowly wean her down, it is felt her dementia is more severe than what was previously anticipated. No additional medication medication changes, behavior modification and therapy given her personality disorders.      Tamera arrives in a wheel chair today citing weakness from her URI. She has been coughing up thick yellow/green phlegm. She has been spending the majority of her day in bed, I discussed with her the importance of getting out of bed each day of she will continue to get weaker. She continues to smoke 2-3 cigarettes per week, has no interest in quitting. Denies fever or chills.         DIagnoses:    Bipolar 1   Dementia  URI    Attestation:  Patient has been seen and evaluated by me,  Naomi Cook, APRN CNP     10 point ROS is negative other than what is listed in HPI.        Interim History:   The patient's care was discussed with the pt and [facility] worker. Chart notes were reviewed.          Medications:      Current Outpatient Prescriptions on File Prior to Visit:  bisacodyl (DULCOLAX) 10 MG Suppository Place 10 mg rectally daily as needed for constipation   donepezil (ARICEPT) 10 MG tablet Take 1 tablet (10 mg) by mouth At Bedtime   gabapentin (NEURONTIN) 100 MG capsule Take 1 capsule (100 mg) by mouth 3 times daily   senna-docusate (SENOKOT-S;PERICOLACE) 8.6-50 MG per tablet Take 1 tablet by mouth daily   ACETAMINOPHEN PO Take 650 mg by mouth every 6 hours as needed for pain   "  DULoxetine (CYMBALTA) 30 MG EC capsule Take 1 capsule daily   CLONIDINE HCL PO Take 0.1 mg by mouth 2 times daily Patient takes at 8 A.M. And 9 P.M.   levothyroxine (SYNTHROID/LEVOTHROID) 75 MCG tablet Take 1 tablet (75 mcg) by mouth daily   traZODone (DESYREL) 100 MG tablet Take 1 tablet (100 mg) by mouth At Bedtime   Omeprazole (PRILOSEC PO) Take 20 mg by mouth every morning   amLODIPine (NORVASC) 5 MG tablet Take 1 tablet (5 mg) by mouth daily (Patient taking differently: Take 5 mg by mouth daily Takes at 9 P.M.)   memantine (NAMENDA) 10 MG tablet Take 1 tablet (10 mg) by mouth daily   multivitamin, therapeutic with minerals (THERA-VIT-M) TABS Take 1 tablet by mouth daily   cholecalciferol 5000 UNITS CAPS Take 1 capsule (5,000 Units) by mouth daily (Patient taking differently: Take 1,000 Units by mouth daily )     No current facility-administered medications on file prior to visit.             Allergies:     Allergies   Allergen Reactions     Benadryl [Diphenhydramine] Unknown     Patient states she is allergic to benadryl     Depakote      Escitalopram      Suicidal ideation     Famotidine      Fluoxetine      Suicidal ideation     Hydrocodone      Lisinopril      swelling     Lithium Swelling     Other [Seasonal Allergies]      Pet hair       Penicillins Nausea     Ramelteon      Complains of orgasm feeling     Risperdal [Risperidone] Unknown     Patient states she is allergic to risperdal       Valproic Acid Unknown     \"seizures\"            Psychiatric Examination:   There were no vitals taken for this visit.  Weight is 0 lbs 0 oz  There is no height or weight on file to calculate BMI.    Appearance:  awake, alert, disheveled.  Attitude:  cooperative  Eye Contact:  good  Mood:  \"I'm sick, how do you think I am\"  Affect:  mood congruent  Speech:  rambling  Psychomotor Behavior:  evidence of tics  Thought Process:  tangental  Associations:  no loose associations  Thought Content:  no evidence of suicidal " ideation or homicidal ideation  Insight:  fair  Judgment:  fair  Oriented to:  time, person, and place  Attention Span and Concentration:  fair  Recent and Remote Memory:  fair  Fund of Knowledge: appropriate  Muscle Strength and Tone: deconditioned  Gait and Station: not observed  Perception : no perceptual disorder noted at time of visit.    EXAM:  TM's visualized w/ good light reflex, landmarks noted.  Nasal turbinates clear  Oral pharynx clear, posterior pharynx clear w/o drainage.  Neck is supple w/o lymph adenopathy.  Heart is in a RRR w/o Murmur.   Lungs w/ scattered inspiratory/expiratory wheezes. Rhonchi in LLL which clear w/ couch.         Labs:    UA- reflex and culture if abnormal.          Assessment/ Plan:    Will decrease Zyprexa 10 mg TID to Zyprexa 7.5 mg TID   Zpac take as directed.  Albuterol inhaler q hours while awake for the next 10 days.  F/U in 1 month.      For all new medications pt was instructed on the drug, dose, route, effects, and potential side effects as well as the anticipated outcomes. Pt verbalized understandings.

## 2018-04-13 NOTE — MR AVS SNAPSHOT
"              After Visit Summary   4/13/2018    Tamera Kumar    MRN: [id number]           Patient Information     Date Of Birth          1940        Visit Information        Provider Department      4/13/2018 9:00 AM Naomi Cook APRN CNP [facility]        Today's Diagnoses     Dysuria    -  1    Acute laryngitis        Recurrent major depressive disorder, in partial remission (H)        Bipolar 1 disorder (H)        Low hemoglobin        High risk medication use          Care Instructions    Will decrease Zyprexa 10 mg TID to Zyprexa 7.5 mg TID     Zpac take as directed.    Albuterol inhaler q hours while awake for the next 10 days.    F/U in 1 month.                  Follow-ups after your visit        Follow-up notes from your care team     Return in about 4 weeks (around 5/11/2018).      Who to contact     If you have questions or need follow up information about today's clinic visit or your schedule please contact [facility] directly at [phone].  Normal or non-critical lab and imaging results will be communicated to you by [employer], letter or phone within 4 business days after the clinic has received the results. If you do not hear from us within 7 days, please contact the clinic through [employer] or phone. If you have a critical or abnormal lab result, we will notify you by phone as soon as possible.  Submit refill requests through [employer]ma or call your pharmacy and they will forward the refill request to us. Please allow 3 business days for your refill to be completed.          Additional Information About Your Visit        [employer] Information     [employer]ma lets you send messages to your doctor, view your test results, renew your prescriptions, schedule appointments and more. To sign up, go to [email or web address] . Click on \"Log in\" on the left side of the screen, which will take you to the Welcome page. Then click on \"Sign up Now\" on the right side of the page. "     You will be asked to enter the access code listed below, as well as some personal information. Please follow the directions to create your username and password.     Your access code is: [email or web address]  Expires: 2018  1:01 PM     Your access code will  in 90 days. If you need help or a new code, please call your [place] clinic or [phone].        Care EveryWhere ID     This is your Care EveryWhere ID. This could be used by other organizations to access your [place] medical records  [id number]        Your Vitals Were     Pulse Temperature Pulse Oximetry BMI (Body Mass Index)          73 97.9  F (36.6  C) (Tympanic) 96% 20.63 kg/m2         Blood Pressure from Last 3 Encounters:   18 120/60   18 (!) 143/105   18 147/94    Weight from Last 3 Encounters:   18 111 lb (50.3 kg)   18 107 lb (48.5 kg)   18 105 lb (47.6 kg)              We Performed the Following     CBC with platelets and differential     Hepatic panel (Albumin, ALT, AST, Bili, Alk Phos, TP)     UA reflex to Microscopic and Culture - [place]     Urine Culture Aerobic Bacterial     Valproic acid          Today's Medication Changes          These changes are accurate as of 18  1:01 PM.  If you have any questions, ask your nurse or doctor.               Start taking these medicines.        Dose/Directions    azithromycin 250 MG tablet   Commonly known as:  ZITHROMAX   Used for:  Acute laryngitis   Started by:  Naomi Cook APRN CNP        Dose:  250 mg   Take 1 tablet (250 mg) by mouth daily Take 2 tabs day one then 1 tab days 2-4   Quantity:  6 tablet   Refills:  0       OLANZapine 5 MG tablet   Commonly known as:  zyPREXA   Used for:  Bipolar 1 disorder (H)   Started by:  Naomi Cook APRN CNP        Take 1.5 tab tablet three times a day   Quantity:  135 tablet   Refills:  3            Where to get your medicines      These medications were sent to [facility] - [place], [place] - [facility] [address]  [address] [facility] [address]     Phone:  [phone]     azithromycin 250 MG tablet    OLANZapine 5 MG tablet                Primary Care Provider Office Phone # Fax #    Keith Lemons [phone]        [address] [facility] [address]        Equal Access to Services     [facility]: Hadii aad ku hadasho Soomaali, waaxda luqadaha, qaybta kaalmada adeegyada, waxay idiin hayaan adeeg kharletetgreta lacamron . [facility] [phone].    ATENCIÓN: Si habla español, tiene a pittman disposición servicios gratuitos de asistencia lingüística. [facility] [phone].    We comply with applicable federal civil rights laws and [place] laws. We do not discriminate on the basis of race, color, national origin, age, disability, sex, sexual orientation, or gender identity.            Thank you!     Thank you for choosing [facility]  for your care. Our goal is always to provide you with excellent care. Hearing back from our patients is one way we can continue to improve our services. Please take a few minutes to complete the written survey that you may receive in the mail after your visit with us. Thank you!             Your Updated Medication List - Protect others around you: Learn how to safely use, store and throw away your medicines at www.disposemymeds.org.          This list is accurate as of 4/13/18  1:01 PM.  Always use your most recent med list.                   Brand Name Dispense Instructions for use Diagnosis    ALBUTEROL IN           AMBIEN PO      Take 5 mg by mouth nightly as needed for sleep        azithromycin 250 MG tablet    ZITHROMAX    6 tablet    Take 1 tablet (250 mg) by mouth daily Take 2 tabs day one then 1 tab days 2-4    Acute laryngitis       BENZONATATE PO      Take 100 mg by mouth every 4 hours as needed for cough        diclofenac 1 % Gel topical gel    VOLTAREN     Place onto the skin as needed for moderate pain        GABAPENTIN PO       Take 250 mg by mouth as needed        INHALER DECONGESTANT IN           MIRALAX PO      Take 17 g by mouth daily        NEW MED      Vix Rub Twice Daily        NEW MED      Rogelio Socks daily        OLANZapine 5 MG tablet    zyPREXA    135 tablet    Take 1.5 tab tablet three times a day    Bipolar 1 disorder (H)       tiotropium 18 MCG capsule    SPIRIVA     Inhale 18 mcg into the lungs daily

## 2018-09-21 NOTE — TELEPHONE ENCOUNTER
DONEPEZIL HCL 10 MG TABLET    Last Written Prescription Date:  unsure  Last Fill Quantity: 0,   # refills: 0  Last Office Visit: 12/7/17  Future Office visit:       Routing refill request to provider for review/approval because:  Drug not active on patient's medication list

## 2018-09-24 NOTE — TELEPHONE ENCOUNTER
Please see note below for Donepezil.  Please advise as medication discontinued on 4/13/18 for reason medication reconciliation clean up.  Please advise.  Thank you.

## 2019-11-22 NOTE — PLAN OF CARE
Problem: Patient Care Overview  Goal: Discharge Needs Assessment  Outcome: Adequate for Discharge Date Met: 02/13/18  Spoke with Jennifer Giraldo -  - she states she is not sure why the [facility] worker wouldn't pick her up as she drives her all over town -  She also states that she agreed the patient has been significantly worse in the past and is likely at baseline.  She will come and pick the patient up between 10:30-10:45 to bring her back to [place].       no
